# Patient Record
Sex: MALE | Race: WHITE | NOT HISPANIC OR LATINO | Employment: OTHER | URBAN - METROPOLITAN AREA
[De-identification: names, ages, dates, MRNs, and addresses within clinical notes are randomized per-mention and may not be internally consistent; named-entity substitution may affect disease eponyms.]

---

## 2018-01-17 ENCOUNTER — APPOINTMENT (OUTPATIENT)
Dept: GENERAL RADIOLOGY | Facility: HOSPITAL | Age: 68
End: 2018-01-17

## 2018-01-17 ENCOUNTER — APPOINTMENT (OUTPATIENT)
Dept: CT IMAGING | Facility: HOSPITAL | Age: 68
End: 2018-01-17
Attending: INTERNAL MEDICINE

## 2018-01-17 ENCOUNTER — HOSPITAL ENCOUNTER (INPATIENT)
Facility: HOSPITAL | Age: 68
LOS: 8 days | Discharge: NURSING, REHAB OR LONG-TERM CARE | End: 2018-01-25
Attending: INTERNAL MEDICINE | Admitting: HOSPITALIST

## 2018-01-17 DIAGNOSIS — R68.81 EARLY SATIETY: Primary | ICD-10-CM

## 2018-01-17 DIAGNOSIS — L97.819: ICD-10-CM

## 2018-01-17 DIAGNOSIS — R41.841 COGNITIVE COMMUNICATION DEFICIT: ICD-10-CM

## 2018-01-17 PROBLEM — E11.10 DKA (DIABETIC KETOACIDOSES): Status: ACTIVE | Noted: 2018-01-17

## 2018-01-17 LAB
ALBUMIN SERPL-MCNC: 2.5 G/DL (ref 3.5–5.2)
ALBUMIN/GLOB SERPL: 0.6 G/DL
ALP SERPL-CCNC: 137 U/L (ref 40–129)
ALT SERPL W P-5'-P-CCNC: 9 U/L (ref 5–41)
ANION GAP SERPL CALCULATED.3IONS-SCNC: 14.3 MMOL/L
ANION GAP SERPL CALCULATED.3IONS-SCNC: 15.4 MMOL/L
ANION GAP SERPL CALCULATED.3IONS-SCNC: 19.5 MMOL/L
ANION GAP SERPL CALCULATED.3IONS-SCNC: 22.3 MMOL/L
ARTERIAL PATENCY WRIST A: ABNORMAL
AST SERPL-CCNC: 9 U/L (ref 5–40)
ATMOSPHERIC PRESS: 753 MMHG
BACTERIA UR QL AUTO: ABNORMAL /HPF
BASE EXCESS BLDA CALC-SCNC: -3.1 MMOL/L (ref 0–2)
BASOPHILS # BLD AUTO: 0.06 10*3/MM3 (ref 0–0.2)
BASOPHILS NFR BLD AUTO: 0.3 % (ref 0–2)
BDY SITE: ABNORMAL
BILIRUB SERPL-MCNC: 0.2 MG/DL (ref 0.2–1.2)
BILIRUB UR QL STRIP: NEGATIVE
BODY TEMPERATURE: 37 C
BUN BLD-MCNC: 34 MG/DL (ref 8–23)
BUN BLD-MCNC: 35 MG/DL (ref 8–23)
BUN BLD-MCNC: 36 MG/DL (ref 8–23)
BUN BLD-MCNC: 36 MG/DL (ref 8–23)
BUN/CREAT SERPL: 10.4 (ref 7–25)
BUN/CREAT SERPL: 10.5 (ref 7–25)
BUN/CREAT SERPL: 11.1 (ref 7–25)
BUN/CREAT SERPL: 11.1 (ref 7–25)
CALCIUM SPEC-SCNC: 8.4 MG/DL (ref 8.8–10.5)
CALCIUM SPEC-SCNC: 8.5 MG/DL (ref 8.8–10.5)
CALCIUM SPEC-SCNC: 8.6 MG/DL (ref 8.8–10.5)
CALCIUM SPEC-SCNC: 8.8 MG/DL (ref 8.8–10.5)
CHLORIDE SERPL-SCNC: 100 MMOL/L (ref 98–107)
CHLORIDE SERPL-SCNC: 100 MMOL/L (ref 98–107)
CHLORIDE SERPL-SCNC: 95 MMOL/L (ref 98–107)
CHLORIDE SERPL-SCNC: 99 MMOL/L (ref 98–107)
CLARITY UR: CLEAR
CO2 SERPL-SCNC: 16.5 MMOL/L (ref 22–29)
CO2 SERPL-SCNC: 16.7 MMOL/L (ref 22–29)
CO2 SERPL-SCNC: 19.6 MMOL/L (ref 22–29)
CO2 SERPL-SCNC: 19.7 MMOL/L (ref 22–29)
COLOR UR: YELLOW
CREAT BLD-MCNC: 3.14 MG/DL (ref 0.76–1.27)
CREAT BLD-MCNC: 3.24 MG/DL (ref 0.76–1.27)
CREAT BLD-MCNC: 3.24 MG/DL (ref 0.76–1.27)
CREAT BLD-MCNC: 3.45 MG/DL (ref 0.76–1.27)
D-LACTATE SERPL-SCNC: 1 MMOL/L (ref 0.5–2)
DEPRECATED RDW RBC AUTO: 38.1 FL (ref 37–54)
EOSINOPHIL # BLD AUTO: 0 10*3/MM3 (ref 0.1–0.3)
EOSINOPHIL NFR BLD AUTO: 0 % (ref 0–4)
ERYTHROCYTE [DISTWIDTH] IN BLOOD BY AUTOMATED COUNT: 12.6 % (ref 11.5–14.5)
GFR SERPL CREATININE-BSD FRML MDRD: 18 ML/MIN/1.73
GFR SERPL CREATININE-BSD FRML MDRD: 19 ML/MIN/1.73
GFR SERPL CREATININE-BSD FRML MDRD: 19 ML/MIN/1.73
GFR SERPL CREATININE-BSD FRML MDRD: 20 ML/MIN/1.73
GLOBULIN UR ELPH-MCNC: 4.3 GM/DL
GLUCOSE BLD-MCNC: 127 MG/DL (ref 65–99)
GLUCOSE BLD-MCNC: 159 MG/DL (ref 65–99)
GLUCOSE BLD-MCNC: 166 MG/DL (ref 65–99)
GLUCOSE BLD-MCNC: 205 MG/DL (ref 65–99)
GLUCOSE BLDC GLUCOMTR-MCNC: 117 MG/DL (ref 70–130)
GLUCOSE BLDC GLUCOMTR-MCNC: 118 MG/DL (ref 70–130)
GLUCOSE BLDC GLUCOMTR-MCNC: 122 MG/DL (ref 70–130)
GLUCOSE BLDC GLUCOMTR-MCNC: 150 MG/DL (ref 70–130)
GLUCOSE BLDC GLUCOMTR-MCNC: 152 MG/DL (ref 70–130)
GLUCOSE BLDC GLUCOMTR-MCNC: 161 MG/DL (ref 70–130)
GLUCOSE BLDC GLUCOMTR-MCNC: 162 MG/DL (ref 70–130)
GLUCOSE BLDC GLUCOMTR-MCNC: 165 MG/DL (ref 70–130)
GLUCOSE BLDC GLUCOMTR-MCNC: 166 MG/DL (ref 70–130)
GLUCOSE BLDC GLUCOMTR-MCNC: 170 MG/DL (ref 70–130)
GLUCOSE BLDC GLUCOMTR-MCNC: 171 MG/DL (ref 70–130)
GLUCOSE BLDC GLUCOMTR-MCNC: 198 MG/DL (ref 70–130)
GLUCOSE BLDC GLUCOMTR-MCNC: 209 MG/DL (ref 70–130)
GLUCOSE BLDC GLUCOMTR-MCNC: 228 MG/DL (ref 70–130)
GLUCOSE UR STRIP-MCNC: ABNORMAL MG/DL
HBA1C MFR BLD: 12.7 % (ref 4.8–5.6)
HCO3 BLDA-SCNC: 20.7 MMOL/L (ref 20–26)
HCT VFR BLD AUTO: 37.2 % (ref 42–52)
HGB BLD-MCNC: 12.8 G/DL (ref 14–18)
HGB BLDA-MCNC: 12.8 G/DL (ref 14–18)
HGB UR QL STRIP.AUTO: ABNORMAL
HYALINE CASTS UR QL AUTO: ABNORMAL /LPF
IMM GRANULOCYTES # BLD: 0.37 10*3/MM3 (ref 0–0.03)
IMM GRANULOCYTES NFR BLD: 1.6 % (ref 0–0.5)
KETONES UR QL STRIP: ABNORMAL
LEUKOCYTE ESTERASE UR QL STRIP.AUTO: NEGATIVE
LYMPHOCYTES # BLD AUTO: 0.78 10*3/MM3 (ref 0.6–4.8)
LYMPHOCYTES NFR BLD AUTO: 3.5 % (ref 20–45)
Lab: ABNORMAL
MAGNESIUM SERPL-MCNC: 2 MG/DL (ref 1.7–2.5)
MAGNESIUM SERPL-MCNC: 2 MG/DL (ref 1.7–2.5)
MAGNESIUM SERPL-MCNC: 2.1 MG/DL (ref 1.7–2.5)
MAGNESIUM SERPL-MCNC: 2.1 MG/DL (ref 1.7–2.5)
MCH RBC QN AUTO: 28.4 PG (ref 27–31)
MCHC RBC AUTO-ENTMCNC: 34.4 G/DL (ref 31–37)
MCV RBC AUTO: 82.7 FL (ref 80–94)
MODALITY: ABNORMAL
MONOCYTES # BLD AUTO: 0.39 10*3/MM3 (ref 0–1)
MONOCYTES NFR BLD AUTO: 1.7 % (ref 3–8)
NEUTROPHILS # BLD AUTO: 20.99 10*3/MM3 (ref 1.5–8.3)
NEUTROPHILS NFR BLD AUTO: 92.9 % (ref 45–70)
NITRITE UR QL STRIP: NEGATIVE
NRBC BLD MANUAL-RTO: 0 /100 WBC (ref 0–0)
PCO2 BLDA: 32.5 MM HG (ref 35–45)
PCO2 TEMP ADJ BLD: 32.5 MM HG (ref 35–45)
PH BLDA: 7.41 PH UNITS (ref 7.35–7.45)
PH UR STRIP.AUTO: 6 [PH] (ref 4.5–8)
PH, TEMP CORRECTED: 7.41 PH UNITS (ref 7.35–7.45)
PHOSPHATE SERPL-MCNC: 3.8 MG/DL (ref 2.7–4.5)
PHOSPHATE SERPL-MCNC: 3.9 MG/DL (ref 2.7–4.5)
PHOSPHATE SERPL-MCNC: 4.5 MG/DL (ref 2.7–4.5)
PHOSPHATE SERPL-MCNC: 4.6 MG/DL (ref 2.7–4.5)
PLATELET # BLD AUTO: 516 10*3/MM3 (ref 140–500)
PMV BLD AUTO: 9.7 FL (ref 7.4–10.4)
PO2 BLDA: 73.6 MM HG (ref 83–108)
PO2 TEMP ADJ BLD: 73.6 MM HG (ref 83–108)
POTASSIUM BLD-SCNC: 3.2 MMOL/L (ref 3.5–5.2)
POTASSIUM BLD-SCNC: 3.2 MMOL/L (ref 3.5–5.2)
POTASSIUM BLD-SCNC: 3.5 MMOL/L (ref 3.5–5.2)
POTASSIUM BLD-SCNC: 3.9 MMOL/L (ref 3.5–5.2)
PROCALCITONIN SERPL-MCNC: 7.22 NG/ML (ref 0.1–0.25)
PROT SERPL-MCNC: 6.8 G/DL (ref 6–8.5)
PROT UR QL STRIP: ABNORMAL
RBC # BLD AUTO: 4.5 10*6/MM3 (ref 4.7–6.1)
RBC # UR: ABNORMAL /HPF
REF LAB TEST METHOD: ABNORMAL
SAO2 % BLDCOA: 94.8 % (ref 94–99)
SODIUM BLD-SCNC: 133 MMOL/L (ref 136–145)
SODIUM BLD-SCNC: 134 MMOL/L (ref 136–145)
SODIUM BLD-SCNC: 135 MMOL/L (ref 136–145)
SODIUM BLD-SCNC: 136 MMOL/L (ref 136–145)
SP GR UR STRIP: 1.01 (ref 1–1.03)
SQUAMOUS #/AREA URNS HPF: ABNORMAL /HPF
TROPONIN T SERPL-MCNC: 0.03 NG/ML (ref 0–0.03)
TROPONIN T SERPL-MCNC: 0.04 NG/ML (ref 0–0.03)
TROPONIN T SERPL-MCNC: 0.04 NG/ML (ref 0–0.03)
UROBILINOGEN UR QL STRIP: ABNORMAL
VENTILATOR MODE: ABNORMAL
WBC NRBC COR # BLD: 22.59 10*3/MM3 (ref 4.8–10.8)
WBC UR QL AUTO: ABNORMAL /HPF

## 2018-01-17 PROCEDURE — 63710000001 INSULIN ASPART PER 5 UNITS: Performed by: HOSPITALIST

## 2018-01-17 PROCEDURE — 80053 COMPREHEN METABOLIC PANEL: CPT | Performed by: INTERNAL MEDICINE

## 2018-01-17 PROCEDURE — 84100 ASSAY OF PHOSPHORUS: CPT | Performed by: INTERNAL MEDICINE

## 2018-01-17 PROCEDURE — 82962 GLUCOSE BLOOD TEST: CPT

## 2018-01-17 PROCEDURE — 25010000002 HYDRALAZINE PER 20 MG: Performed by: INTERNAL MEDICINE

## 2018-01-17 PROCEDURE — 84484 ASSAY OF TROPONIN QUANT: CPT | Performed by: INTERNAL MEDICINE

## 2018-01-17 PROCEDURE — 83605 ASSAY OF LACTIC ACID: CPT | Performed by: INTERNAL MEDICINE

## 2018-01-17 PROCEDURE — 25010000002 PIPERACILLIN-TAZOBACTAM: Performed by: INTERNAL MEDICINE

## 2018-01-17 PROCEDURE — 63710000001 INSULIN DETEMIR PER 5 UNITS: Performed by: HOSPITALIST

## 2018-01-17 PROCEDURE — 84145 PROCALCITONIN (PCT): CPT | Performed by: INTERNAL MEDICINE

## 2018-01-17 PROCEDURE — 83036 HEMOGLOBIN GLYCOSYLATED A1C: CPT | Performed by: INTERNAL MEDICINE

## 2018-01-17 PROCEDURE — 25010000002 HYDROMORPHONE PER 4 MG: Performed by: INTERNAL MEDICINE

## 2018-01-17 PROCEDURE — 82803 BLOOD GASES ANY COMBINATION: CPT

## 2018-01-17 PROCEDURE — 93010 ELECTROCARDIOGRAM REPORT: CPT | Performed by: INTERNAL MEDICINE

## 2018-01-17 PROCEDURE — 25010000002 ONDANSETRON PER 1 MG: Performed by: INTERNAL MEDICINE

## 2018-01-17 PROCEDURE — 36600 WITHDRAWAL OF ARTERIAL BLOOD: CPT

## 2018-01-17 PROCEDURE — 25010000002 ENOXAPARIN PER 10 MG

## 2018-01-17 PROCEDURE — 25010000002 HYDRALAZINE PER 20 MG: Performed by: NURSE PRACTITIONER

## 2018-01-17 PROCEDURE — 93005 ELECTROCARDIOGRAM TRACING: CPT | Performed by: INTERNAL MEDICINE

## 2018-01-17 PROCEDURE — 25010000002 PIPERACILLIN SOD-TAZOBACTAM PER 1 G

## 2018-01-17 PROCEDURE — 71045 X-RAY EXAM CHEST 1 VIEW: CPT

## 2018-01-17 PROCEDURE — 74176 CT ABD & PELVIS W/O CONTRAST: CPT

## 2018-01-17 PROCEDURE — 25010000002 PROMETHAZINE PER 50 MG: Performed by: HOSPITALIST

## 2018-01-17 PROCEDURE — 83735 ASSAY OF MAGNESIUM: CPT | Performed by: INTERNAL MEDICINE

## 2018-01-17 PROCEDURE — 25010000002 VANCOMYCIN PER 500 MG: Performed by: INTERNAL MEDICINE

## 2018-01-17 PROCEDURE — 25010000003 POTASSIUM CHLORIDE 10 MEQ/100ML SOLUTION: Performed by: INTERNAL MEDICINE

## 2018-01-17 PROCEDURE — 25010000002 PROMETHAZINE PER 50 MG: Performed by: NURSE PRACTITIONER

## 2018-01-17 PROCEDURE — 25010000002 ONDANSETRON PER 1 MG

## 2018-01-17 PROCEDURE — 85025 COMPLETE CBC W/AUTO DIFF WBC: CPT | Performed by: INTERNAL MEDICINE

## 2018-01-17 PROCEDURE — 99223 1ST HOSP IP/OBS HIGH 75: CPT | Performed by: HOSPITALIST

## 2018-01-17 PROCEDURE — 25010000002 HYDRALAZINE PER 20 MG

## 2018-01-17 PROCEDURE — 81001 URINALYSIS AUTO W/SCOPE: CPT | Performed by: INTERNAL MEDICINE

## 2018-01-17 RX ORDER — PIPERACILLIN SODIUM, TAZOBACTAM SODIUM 3; .375 G/15ML; G/15ML
INJECTION, POWDER, LYOPHILIZED, FOR SOLUTION INTRAVENOUS
Status: COMPLETED
Start: 2018-01-17 | End: 2018-01-17

## 2018-01-17 RX ORDER — TRAZODONE HYDROCHLORIDE 50 MG/1
50 TABLET ORAL 2 TIMES DAILY
COMMUNITY
End: 2018-01-25 | Stop reason: HOSPADM

## 2018-01-17 RX ORDER — SODIUM CHLORIDE 9 MG/ML
40 INJECTION, SOLUTION INTRAVENOUS AS NEEDED
Status: DISCONTINUED | OUTPATIENT
Start: 2018-01-17 | End: 2018-01-25 | Stop reason: HOSPADM

## 2018-01-17 RX ORDER — DEXTROSE MONOHYDRATE 25 G/50ML
12.5 INJECTION, SOLUTION INTRAVENOUS
Status: DISCONTINUED | OUTPATIENT
Start: 2018-01-17 | End: 2018-01-25 | Stop reason: HOSPADM

## 2018-01-17 RX ORDER — PROMETHAZINE HYDROCHLORIDE 25 MG/ML
INJECTION, SOLUTION INTRAMUSCULAR; INTRAVENOUS
Status: DISPENSED
Start: 2018-01-17 | End: 2018-01-18

## 2018-01-17 RX ORDER — NICOTINE POLACRILEX 4 MG
15 LOZENGE BUCCAL
Status: DISCONTINUED | OUTPATIENT
Start: 2018-01-17 | End: 2018-01-18

## 2018-01-17 RX ORDER — GABAPENTIN 300 MG/1
300 CAPSULE ORAL 3 TIMES DAILY
COMMUNITY
End: 2018-01-25 | Stop reason: HOSPADM

## 2018-01-17 RX ORDER — RISPERIDONE 1 MG/1
2 TABLET ORAL DAILY
Status: DISCONTINUED | OUTPATIENT
Start: 2018-01-17 | End: 2018-01-18

## 2018-01-17 RX ORDER — PROMETHAZINE HYDROCHLORIDE 12.5 MG/1
12.5 SUPPOSITORY RECTAL EVERY 6 HOURS PRN
Status: DISCONTINUED | OUTPATIENT
Start: 2018-01-17 | End: 2018-01-17

## 2018-01-17 RX ORDER — PROMETHAZINE HYDROCHLORIDE 25 MG/ML
12.5 INJECTION, SOLUTION INTRAMUSCULAR; INTRAVENOUS EVERY 6 HOURS PRN
Status: DISCONTINUED | OUTPATIENT
Start: 2018-01-17 | End: 2018-01-17

## 2018-01-17 RX ORDER — POTASSIUM CHLORIDE 7.45 MG/ML
INJECTION INTRAVENOUS
Status: DISPENSED
Start: 2018-01-17 | End: 2018-01-17

## 2018-01-17 RX ORDER — POTASSIUM CHLORIDE 7.46 G/1000ML
10 INJECTION, SOLUTION INTRAVENOUS
Status: DISCONTINUED | OUTPATIENT
Start: 2018-01-17 | End: 2018-01-18

## 2018-01-17 RX ORDER — MAGNESIUM SULFATE HEPTAHYDRATE 40 MG/ML
2 INJECTION, SOLUTION INTRAVENOUS AS NEEDED
Status: DISCONTINUED | OUTPATIENT
Start: 2018-01-17 | End: 2018-01-18

## 2018-01-17 RX ORDER — ACETAMINOPHEN 325 MG/1
650 TABLET ORAL EVERY 6 HOURS PRN
COMMUNITY

## 2018-01-17 RX ORDER — CITALOPRAM 20 MG/1
20 TABLET ORAL DAILY
COMMUNITY

## 2018-01-17 RX ORDER — SODIUM CHLORIDE 9 MG/ML
100 INJECTION, SOLUTION INTRAVENOUS CONTINUOUS
Status: DISCONTINUED | OUTPATIENT
Start: 2018-01-17 | End: 2018-01-18

## 2018-01-17 RX ORDER — ONDANSETRON 2 MG/ML
4 INJECTION INTRAMUSCULAR; INTRAVENOUS EVERY 6 HOURS PRN
Status: DISCONTINUED | OUTPATIENT
Start: 2018-01-17 | End: 2018-01-25 | Stop reason: HOSPADM

## 2018-01-17 RX ORDER — DEXTROSE AND SODIUM CHLORIDE 5; .45 G/100ML; G/100ML
125 INJECTION, SOLUTION INTRAVENOUS CONTINUOUS
Status: DISCONTINUED | OUTPATIENT
Start: 2018-01-17 | End: 2018-01-17

## 2018-01-17 RX ORDER — PETROLATUM 42 G/100G
OINTMENT TOPICAL AS NEEDED
Status: DISCONTINUED | OUTPATIENT
Start: 2018-01-17 | End: 2018-01-25 | Stop reason: HOSPADM

## 2018-01-17 RX ORDER — ASPIRIN 81 MG/1
81 TABLET ORAL DAILY
COMMUNITY

## 2018-01-17 RX ORDER — ENALAPRILAT 2.5 MG/2ML
2.5 INJECTION INTRAVENOUS EVERY 6 HOURS
Status: DISCONTINUED | OUTPATIENT
Start: 2018-01-17 | End: 2018-01-17

## 2018-01-17 RX ORDER — DEXTROSE MONOHYDRATE 25 G/50ML
25 INJECTION, SOLUTION INTRAVENOUS
Status: DISCONTINUED | OUTPATIENT
Start: 2018-01-17 | End: 2018-01-25 | Stop reason: HOSPADM

## 2018-01-17 RX ORDER — ONDANSETRON 2 MG/ML
INJECTION INTRAMUSCULAR; INTRAVENOUS
Status: COMPLETED
Start: 2018-01-17 | End: 2018-01-17

## 2018-01-17 RX ORDER — CLONIDINE HYDROCHLORIDE 0.1 MG/1
0.1 TABLET ORAL 3 TIMES DAILY
COMMUNITY

## 2018-01-17 RX ORDER — MAGNESIUM SULFATE HEPTAHYDRATE 40 MG/ML
4 INJECTION, SOLUTION INTRAVENOUS AS NEEDED
Status: DISCONTINUED | OUTPATIENT
Start: 2018-01-17 | End: 2018-01-18

## 2018-01-17 RX ORDER — PETROLATUM 42 G/100G
OINTMENT TOPICAL EVERY 12 HOURS SCHEDULED
Status: DISCONTINUED | OUTPATIENT
Start: 2018-01-17 | End: 2018-01-17

## 2018-01-17 RX ORDER — PROMETHAZINE HYDROCHLORIDE 12.5 MG/1
12.5 SUPPOSITORY RECTAL EVERY 6 HOURS PRN
Status: DISCONTINUED | OUTPATIENT
Start: 2018-01-17 | End: 2018-01-18

## 2018-01-17 RX ORDER — HYDROMORPHONE HCL 110MG/55ML
1 PATIENT CONTROLLED ANALGESIA SYRINGE INTRAVENOUS EVERY 4 HOURS PRN
Status: DISCONTINUED | OUTPATIENT
Start: 2018-01-17 | End: 2018-01-18

## 2018-01-17 RX ORDER — POTASSIUM CHLORIDE 750 MG/1
20 CAPSULE, EXTENDED RELEASE ORAL AS NEEDED
Status: DISCONTINUED | OUTPATIENT
Start: 2018-01-17 | End: 2018-01-18

## 2018-01-17 RX ORDER — POTASSIUM CHLORIDE 1.5 G/1.77G
20 POWDER, FOR SOLUTION ORAL AS NEEDED
Status: DISCONTINUED | OUTPATIENT
Start: 2018-01-17 | End: 2018-01-18

## 2018-01-17 RX ORDER — RISPERIDONE 2 MG/1
2 TABLET ORAL DAILY
COMMUNITY
End: 2018-04-12

## 2018-01-17 RX ORDER — HYDRALAZINE HYDROCHLORIDE 20 MG/ML
20 INJECTION INTRAMUSCULAR; INTRAVENOUS EVERY 6 HOURS PRN
Status: DISCONTINUED | OUTPATIENT
Start: 2018-01-17 | End: 2018-01-25 | Stop reason: HOSPADM

## 2018-01-17 RX ORDER — POTASSIUM CHLORIDE 750 MG/1
10 CAPSULE, EXTENDED RELEASE ORAL AS NEEDED
Status: DISCONTINUED | OUTPATIENT
Start: 2018-01-17 | End: 2018-01-18

## 2018-01-17 RX ORDER — OXYCODONE HYDROCHLORIDE AND ACETAMINOPHEN 5; 325 MG/1; MG/1
1 TABLET ORAL DAILY
COMMUNITY
End: 2018-01-25 | Stop reason: HOSPADM

## 2018-01-17 RX ORDER — ENALAPRILAT 2.5 MG/2ML
INJECTION INTRAVENOUS
Status: COMPLETED
Start: 2018-01-17 | End: 2018-01-17

## 2018-01-17 RX ORDER — LISINOPRIL 20 MG/1
20 TABLET ORAL DAILY
COMMUNITY
End: 2018-01-25 | Stop reason: HOSPADM

## 2018-01-17 RX ORDER — DEXTROSE, SODIUM CHLORIDE, AND POTASSIUM CHLORIDE 5; .45; .15 G/100ML; G/100ML; G/100ML
150 INJECTION INTRAVENOUS CONTINUOUS PRN
Status: DISCONTINUED | OUTPATIENT
Start: 2018-01-17 | End: 2018-01-17

## 2018-01-17 RX ORDER — SODIUM CHLORIDE 9 MG/ML
INJECTION, SOLUTION INTRAVENOUS
Status: DISPENSED
Start: 2018-01-17 | End: 2018-01-17

## 2018-01-17 RX ORDER — POTASSIUM CHLORIDE 1.5 G/1.77G
10 POWDER, FOR SOLUTION ORAL AS NEEDED
Status: DISCONTINUED | OUTPATIENT
Start: 2018-01-17 | End: 2018-01-18

## 2018-01-17 RX ORDER — PROMETHAZINE HYDROCHLORIDE 25 MG/ML
12.5 INJECTION, SOLUTION INTRAMUSCULAR; INTRAVENOUS EVERY 6 HOURS PRN
Status: DISCONTINUED | OUTPATIENT
Start: 2018-01-17 | End: 2018-01-18

## 2018-01-17 RX ORDER — SODIUM CHLORIDE AND POTASSIUM CHLORIDE 150; 450 MG/100ML; MG/100ML
250 INJECTION, SOLUTION INTRAVENOUS CONTINUOUS PRN
Status: DISCONTINUED | OUTPATIENT
Start: 2018-01-17 | End: 2018-01-17

## 2018-01-17 RX ORDER — SODIUM CHLORIDE 9 MG/ML
INJECTION, SOLUTION INTRAVENOUS
Status: COMPLETED
Start: 2018-01-17 | End: 2018-01-17

## 2018-01-17 RX ORDER — POTASSIUM CHLORIDE 1.5 G/1.77G
40 POWDER, FOR SOLUTION ORAL AS NEEDED
Status: DISCONTINUED | OUTPATIENT
Start: 2018-01-17 | End: 2018-01-18

## 2018-01-17 RX ORDER — PROMETHAZINE HYDROCHLORIDE 12.5 MG/1
12.5 TABLET ORAL EVERY 6 HOURS PRN
Status: DISCONTINUED | OUTPATIENT
Start: 2018-01-17 | End: 2018-01-17

## 2018-01-17 RX ORDER — SODIUM CHLORIDE 450 MG/100ML
250 INJECTION, SOLUTION INTRAVENOUS CONTINUOUS
Status: DISCONTINUED | OUTPATIENT
Start: 2018-01-17 | End: 2018-01-17

## 2018-01-17 RX ORDER — ACETAMINOPHEN 650 MG/1
650 SUPPOSITORY RECTAL EVERY 4 HOURS PRN
Status: DISCONTINUED | OUTPATIENT
Start: 2018-01-17 | End: 2018-01-25 | Stop reason: HOSPADM

## 2018-01-17 RX ORDER — CLONIDINE HYDROCHLORIDE 0.1 MG/1
0.1 TABLET ORAL EVERY 12 HOURS SCHEDULED
Status: DISCONTINUED | OUTPATIENT
Start: 2018-01-17 | End: 2018-01-25 | Stop reason: HOSPADM

## 2018-01-17 RX ORDER — INSULIN GLARGINE 100 [IU]/ML
INJECTION, SOLUTION SUBCUTANEOUS DAILY
COMMUNITY
End: 2018-01-25 | Stop reason: HOSPADM

## 2018-01-17 RX ORDER — SODIUM CHLORIDE 0.9 % (FLUSH) 0.9 %
1-10 SYRINGE (ML) INJECTION AS NEEDED
Status: DISCONTINUED | OUTPATIENT
Start: 2018-01-17 | End: 2018-01-25 | Stop reason: HOSPADM

## 2018-01-17 RX ORDER — PROMETHAZINE HYDROCHLORIDE 12.5 MG/1
12.5 TABLET ORAL EVERY 6 HOURS PRN
Status: DISCONTINUED | OUTPATIENT
Start: 2018-01-17 | End: 2018-01-18

## 2018-01-17 RX ORDER — POTASSIUM CHLORIDE 750 MG/1
40 CAPSULE, EXTENDED RELEASE ORAL AS NEEDED
Status: DISCONTINUED | OUTPATIENT
Start: 2018-01-17 | End: 2018-01-18

## 2018-01-17 RX ORDER — DEXTROSE, SODIUM CHLORIDE, AND POTASSIUM CHLORIDE 5; .45; .15 G/100ML; G/100ML; G/100ML
INJECTION INTRAVENOUS
Status: COMPLETED
Start: 2018-01-17 | End: 2018-01-17

## 2018-01-17 RX ORDER — FAMOTIDINE 10 MG/ML
20 INJECTION, SOLUTION INTRAVENOUS DAILY
Status: DISCONTINUED | OUTPATIENT
Start: 2018-01-17 | End: 2018-01-20

## 2018-01-17 RX ORDER — BUPROPION HYDROCHLORIDE 100 MG/1
100 TABLET ORAL 2 TIMES DAILY
COMMUNITY
End: 2018-01-25 | Stop reason: HOSPADM

## 2018-01-17 RX ORDER — HYDRALAZINE HYDROCHLORIDE 20 MG/ML
INJECTION INTRAMUSCULAR; INTRAVENOUS
Status: COMPLETED
Start: 2018-01-17 | End: 2018-01-17

## 2018-01-17 RX ORDER — DEXTROSE AND SODIUM CHLORIDE 5; .45 G/100ML; G/100ML
150 INJECTION, SOLUTION INTRAVENOUS CONTINUOUS PRN
Status: DISCONTINUED | OUTPATIENT
Start: 2018-01-17 | End: 2018-01-17

## 2018-01-17 RX ORDER — TRAZODONE HYDROCHLORIDE 50 MG/1
50 TABLET ORAL NIGHTLY
Status: DISCONTINUED | OUTPATIENT
Start: 2018-01-17 | End: 2018-01-18

## 2018-01-17 RX ADMIN — PIPERACILLIN SODIUM,TAZOBACTAM SODIUM 3.38 G: 3; .375 INJECTION, POWDER, FOR SOLUTION INTRAVENOUS at 06:23

## 2018-01-17 RX ADMIN — HYDRALAZINE HYDROCHLORIDE 20 MG: 20 INJECTION INTRAMUSCULAR; INTRAVENOUS at 15:35

## 2018-01-17 RX ADMIN — SODIUM CHLORIDE 50 ML: 9 INJECTION, SOLUTION INTRAVENOUS at 10:27

## 2018-01-17 RX ADMIN — POTASSIUM CHLORIDE 10 MEQ: 10 INJECTION, SOLUTION INTRAVENOUS at 05:15

## 2018-01-17 RX ADMIN — ENALAPRILAT 2.5 MG: 2.5 INJECTION INTRAVENOUS at 03:15

## 2018-01-17 RX ADMIN — RISPERIDONE 2 MG: 1 TABLET ORAL at 21:01

## 2018-01-17 RX ADMIN — PIPERACILLIN SODIUM,TAZOBACTAM SODIUM 3.38 G: 3; .375 INJECTION, POWDER, FOR SOLUTION INTRAVENOUS at 19:11

## 2018-01-17 RX ADMIN — PROMETHAZINE HYDROCHLORIDE 12.5 MG: 25 INJECTION INTRAMUSCULAR; INTRAVENOUS at 17:49

## 2018-01-17 RX ADMIN — POTASSIUM CHLORIDE 10 MEQ: 10 INJECTION, SOLUTION INTRAVENOUS at 09:18

## 2018-01-17 RX ADMIN — HYDRALAZINE HYDROCHLORIDE 20 MG: 20 INJECTION INTRAMUSCULAR; INTRAVENOUS at 06:48

## 2018-01-17 RX ADMIN — HYDROMORPHONE HYDROCHLORIDE 1 MG: 2 INJECTION INTRAMUSCULAR; INTRAVENOUS; SUBCUTANEOUS at 17:09

## 2018-01-17 RX ADMIN — HYDROMORPHONE HYDROCHLORIDE 1 MG: 2 INJECTION INTRAMUSCULAR; INTRAVENOUS; SUBCUTANEOUS at 10:56

## 2018-01-17 RX ADMIN — INSULIN DETEMIR 6 UNITS: 100 INJECTION, SOLUTION SUBCUTANEOUS at 19:15

## 2018-01-17 RX ADMIN — HYDROMORPHONE HYDROCHLORIDE 1 MG: 2 INJECTION INTRAMUSCULAR; INTRAVENOUS; SUBCUTANEOUS at 21:10

## 2018-01-17 RX ADMIN — CLONIDINE HYDROCHLORIDE 0.1 MG: 0.1 TABLET ORAL at 20:55

## 2018-01-17 RX ADMIN — PETROLATUM: 42 OINTMENT TOPICAL at 15:30

## 2018-01-17 RX ADMIN — POTASSIUM CHLORIDE 10 MEQ: 10 INJECTION, SOLUTION INTRAVENOUS at 08:05

## 2018-01-17 RX ADMIN — CLONIDINE HYDROCHLORIDE 0.1 MG: 0.1 TABLET ORAL at 11:06

## 2018-01-17 RX ADMIN — ENOXAPARIN SODIUM 40 MG: 40 INJECTION SUBCUTANEOUS at 06:30

## 2018-01-17 RX ADMIN — HYDRALAZINE HYDROCHLORIDE 20 MG: 20 INJECTION INTRAMUSCULAR; INTRAVENOUS at 09:13

## 2018-01-17 RX ADMIN — FAMOTIDINE 20 MG: 10 INJECTION, SOLUTION INTRAVENOUS at 08:11

## 2018-01-17 RX ADMIN — ONDANSETRON 4 MG: 2 INJECTION, SOLUTION INTRAMUSCULAR; INTRAVENOUS at 15:42

## 2018-01-17 RX ADMIN — POTASSIUM CHLORIDE, DEXTROSE MONOHYDRATE AND SODIUM CHLORIDE 150 ML/HR: 150; 5; 450 INJECTION, SOLUTION INTRAVENOUS at 11:21

## 2018-01-17 RX ADMIN — DEXTROSE MONOHYDRATE, SODIUM CHLORIDE, AND POTASSIUM CHLORIDE 150 ML/HR: 50; 4.5; 1.49 INJECTION, SOLUTION INTRAVENOUS at 03:14

## 2018-01-17 RX ADMIN — POTASSIUM CHLORIDE 10 MEQ: 10 INJECTION, SOLUTION INTRAVENOUS at 15:30

## 2018-01-17 RX ADMIN — ONDANSETRON 4 MG: 2 INJECTION, SOLUTION INTRAMUSCULAR; INTRAVENOUS at 03:15

## 2018-01-17 RX ADMIN — TRAZODONE HYDROCHLORIDE 50 MG: 50 TABLET ORAL at 21:02

## 2018-01-17 RX ADMIN — PROMETHAZINE HYDROCHLORIDE 12.5 MG: 25 INJECTION INTRAMUSCULAR; INTRAVENOUS at 10:26

## 2018-01-17 RX ADMIN — SODIUM CHLORIDE 1 G: 900 INJECTION, SOLUTION INTRAVENOUS at 03:45

## 2018-01-17 RX ADMIN — METOPROLOL TARTRATE 25 MG: 25 TABLET, FILM COATED ORAL at 21:02

## 2018-01-17 RX ADMIN — ENALAPRILAT 2.5 MG: 2.5 INJECTION INTRAVENOUS at 08:49

## 2018-01-17 RX ADMIN — HYDROMORPHONE HYDROCHLORIDE 1 MG: 2 INJECTION INTRAMUSCULAR; INTRAVENOUS; SUBCUTANEOUS at 06:30

## 2018-01-17 RX ADMIN — SODIUM CHLORIDE 125 ML/HR: 9 INJECTION, SOLUTION INTRAVENOUS at 19:10

## 2018-01-17 RX ADMIN — INSULIN ASPART 4 UNITS: 100 INJECTION, SOLUTION INTRAVENOUS; SUBCUTANEOUS at 21:01

## 2018-01-17 RX ADMIN — SODIUM CHLORIDE 1000 ML: 9 INJECTION, SOLUTION INTRAVENOUS at 01:57

## 2018-01-17 RX ADMIN — POTASSIUM CHLORIDE 10 MEQ: 10 INJECTION, SOLUTION INTRAVENOUS at 14:03

## 2018-01-17 RX ADMIN — POTASSIUM CHLORIDE, DEXTROSE MONOHYDRATE AND SODIUM CHLORIDE 150 ML/HR: 150; 5; 450 INJECTION, SOLUTION INTRAVENOUS at 03:14

## 2018-01-17 RX ADMIN — PIPERACILLIN SODIUM,TAZOBACTAM SODIUM 3.38 G: 3; .375 INJECTION, POWDER, FOR SOLUTION INTRAVENOUS at 14:13

## 2018-01-17 RX ADMIN — METOROPROLOL TARTRATE 5 MG: 5 INJECTION, SOLUTION INTRAVENOUS at 19:22

## 2018-01-17 RX ADMIN — POTASSIUM CHLORIDE 10 MEQ: 10 INJECTION, SOLUTION INTRAVENOUS at 06:52

## 2018-01-17 RX ADMIN — PIPERACILLIN SODIUM,TAZOBACTAM SODIUM 3375 MG: 3; .375 INJECTION, POWDER, FOR SOLUTION INTRAVENOUS at 14:02

## 2018-01-17 RX ADMIN — ONDANSETRON 4 MG: 2 INJECTION, SOLUTION INTRAMUSCULAR; INTRAVENOUS at 08:10

## 2018-01-18 ENCOUNTER — APPOINTMENT (OUTPATIENT)
Dept: ULTRASOUND IMAGING | Facility: HOSPITAL | Age: 68
End: 2018-01-18

## 2018-01-18 ENCOUNTER — INPATIENT HOSPITAL (OUTPATIENT)
Dept: URBAN - METROPOLITAN AREA HOSPITAL 27 | Facility: HOSPITAL | Age: 68
End: 2018-01-18
Payer: MEDICARE

## 2018-01-18 ENCOUNTER — APPOINTMENT (OUTPATIENT)
Dept: GENERAL RADIOLOGY | Facility: HOSPITAL | Age: 68
End: 2018-01-18

## 2018-01-18 DIAGNOSIS — R11.2 NAUSEA WITH VOMITING, UNSPECIFIED: ICD-10-CM

## 2018-01-18 DIAGNOSIS — K80.20 CALCULUS OF GALLBLADDER WITHOUT CHOLECYSTITIS WITHOUT OBSTRU: ICD-10-CM

## 2018-01-18 DIAGNOSIS — R68.81 EARLY SATIETY: ICD-10-CM

## 2018-01-18 DIAGNOSIS — R10.30 LOWER ABDOMINAL PAIN, UNSPECIFIED: ICD-10-CM

## 2018-01-18 LAB
ANION GAP SERPL CALCULATED.3IONS-SCNC: 14.2 MMOL/L
ANION GAP SERPL CALCULATED.3IONS-SCNC: 19.3 MMOL/L
ARTERIAL PATENCY WRIST A: ABNORMAL
ATMOSPHERIC PRESS: 746 MMHG
BASE EXCESS BLDA CALC-SCNC: -7.8 MMOL/L (ref 0–2)
BASOPHILS # BLD AUTO: 0.04 10*3/MM3 (ref 0–0.2)
BASOPHILS NFR BLD AUTO: 0.2 % (ref 0–2)
BDY SITE: ABNORMAL
BODY TEMPERATURE: 37 C
BUN BLD-MCNC: 36 MG/DL (ref 8–23)
BUN BLD-MCNC: 40 MG/DL (ref 8–23)
BUN/CREAT SERPL: 10.2 (ref 7–25)
BUN/CREAT SERPL: 11 (ref 7–25)
C DIFF GDH STL QL: NEGATIVE
CALCIUM SPEC-SCNC: 8.3 MG/DL (ref 8.8–10.5)
CALCIUM SPEC-SCNC: 8.5 MG/DL (ref 8.8–10.5)
CHLORIDE SERPL-SCNC: 101 MMOL/L (ref 98–107)
CHLORIDE SERPL-SCNC: 105 MMOL/L (ref 98–107)
CHLORIDE UR-SCNC: 32 MMOL/L
CK SERPL-CCNC: 46 U/L (ref 36–170)
CO2 SERPL-SCNC: 14.7 MMOL/L (ref 22–29)
CO2 SERPL-SCNC: 18.8 MMOL/L (ref 22–29)
CREAT BLD-MCNC: 3.54 MG/DL (ref 0.76–1.27)
CREAT BLD-MCNC: 3.64 MG/DL (ref 0.76–1.27)
CREAT UR-MCNC: 38.5 MG/DL
D-LACTATE SERPL-SCNC: 0.7 MMOL/L (ref 0.5–2)
DEPRECATED RDW RBC AUTO: 40.9 FL (ref 37–54)
EOSINOPHIL # BLD AUTO: 0.01 10*3/MM3 (ref 0.1–0.3)
EOSINOPHIL NFR BLD AUTO: 0.1 % (ref 0–4)
ERYTHROCYTE [DISTWIDTH] IN BLOOD BY AUTOMATED COUNT: 13.2 % (ref 11.5–14.5)
ERYTHROCYTE [SEDIMENTATION RATE] IN BLOOD: 85 MM/HR (ref 0–20)
GAS FLOW AIRWAY: 0 LPM
GFR SERPL CREATININE-BSD FRML MDRD: 17 ML/MIN/1.73
GFR SERPL CREATININE-BSD FRML MDRD: 17 ML/MIN/1.73
GLUCOSE BLD-MCNC: 160 MG/DL (ref 65–99)
GLUCOSE BLD-MCNC: 236 MG/DL (ref 65–99)
GLUCOSE BLDC GLUCOMTR-MCNC: 151 MG/DL (ref 70–130)
GLUCOSE BLDC GLUCOMTR-MCNC: 231 MG/DL (ref 70–130)
HCO3 BLDA-SCNC: 16 MMOL/L (ref 20–26)
HCT VFR BLD AUTO: 35.8 % (ref 42–52)
HGB BLD-MCNC: 11.8 G/DL (ref 14–18)
HGB BLDA-MCNC: 11.6 G/DL (ref 14–18)
IMM GRANULOCYTES # BLD: 0.4 10*3/MM3 (ref 0–0.03)
IMM GRANULOCYTES NFR BLD: 2.2 % (ref 0–0.5)
LYMPHOCYTES # BLD AUTO: 0.95 10*3/MM3 (ref 0.6–4.8)
LYMPHOCYTES NFR BLD AUTO: 5.3 % (ref 20–45)
Lab: ABNORMAL
MCH RBC QN AUTO: 28 PG (ref 27–31)
MCHC RBC AUTO-ENTMCNC: 33 G/DL (ref 31–37)
MCV RBC AUTO: 85 FL (ref 80–94)
MODALITY: ABNORMAL
MONOCYTES # BLD AUTO: 0.73 10*3/MM3 (ref 0–1)
MONOCYTES NFR BLD AUTO: 4.1 % (ref 3–8)
NEUTROPHILS # BLD AUTO: 15.89 10*3/MM3 (ref 1.5–8.3)
NEUTROPHILS NFR BLD AUTO: 88.1 % (ref 45–70)
NRBC BLD MANUAL-RTO: 0 /100 WBC (ref 0–0)
PCO2 BLDA: 27.2 MM HG (ref 35–45)
PCO2 TEMP ADJ BLD: 27.2 MM HG (ref 35–45)
PH BLDA: 7.38 PH UNITS (ref 7.35–7.45)
PH, TEMP CORRECTED: 7.38 PH UNITS (ref 7.35–7.45)
PLATELET # BLD AUTO: 459 10*3/MM3 (ref 140–500)
PMV BLD AUTO: 9.5 FL (ref 7.4–10.4)
PO2 BLDA: 76.6 MM HG (ref 83–108)
PO2 TEMP ADJ BLD: 76.6 MM HG (ref 83–108)
POTASSIUM BLD-SCNC: 3.4 MMOL/L (ref 3.5–5.2)
POTASSIUM BLD-SCNC: 3.5 MMOL/L (ref 3.5–5.2)
PROCALCITONIN SERPL-MCNC: 4.17 NG/ML (ref 0.1–0.25)
PROT UR-MCNC: 450 MG/DL
RBC # BLD AUTO: 4.21 10*6/MM3 (ref 4.7–6.1)
SAO2 % BLDCOA: 95.5 % (ref 94–99)
SODIUM BLD-SCNC: 135 MMOL/L (ref 136–145)
SODIUM BLD-SCNC: 138 MMOL/L (ref 136–145)
SODIUM UR-SCNC: 41 MMOL/L
VANCOMYCIN SERPL-MCNC: 8 MCG/ML (ref 5–40)
VENTILATOR MODE: ABNORMAL
WBC NRBC COR # BLD: 18.02 10*3/MM3 (ref 4.8–10.8)

## 2018-01-18 PROCEDURE — 84300 ASSAY OF URINE SODIUM: CPT | Performed by: INTERNAL MEDICINE

## 2018-01-18 PROCEDURE — 25010000002 HYDRALAZINE PER 20 MG: Performed by: NURSE PRACTITIONER

## 2018-01-18 PROCEDURE — 82550 ASSAY OF CK (CPK): CPT | Performed by: INTERNAL MEDICINE

## 2018-01-18 PROCEDURE — 82962 GLUCOSE BLOOD TEST: CPT

## 2018-01-18 PROCEDURE — 83605 ASSAY OF LACTIC ACID: CPT | Performed by: HOSPITALIST

## 2018-01-18 PROCEDURE — 63710000001 INSULIN ASPART PER 5 UNITS: Performed by: HOSPITALIST

## 2018-01-18 PROCEDURE — 36600 WITHDRAWAL OF ARTERIAL BLOOD: CPT

## 2018-01-18 PROCEDURE — 25010000002 VANCOMYCIN PER 500 MG: Performed by: INTERNAL MEDICINE

## 2018-01-18 PROCEDURE — 87449 NOS EACH ORGANISM AG IA: CPT | Performed by: HOSPITALIST

## 2018-01-18 PROCEDURE — 87147 CULTURE TYPE IMMUNOLOGIC: CPT | Performed by: HOSPITALIST

## 2018-01-18 PROCEDURE — 82436 ASSAY OF URINE CHLORIDE: CPT | Performed by: INTERNAL MEDICINE

## 2018-01-18 PROCEDURE — 87186 SC STD MICRODIL/AGAR DIL: CPT | Performed by: HOSPITALIST

## 2018-01-18 PROCEDURE — 87324 CLOSTRIDIUM AG IA: CPT | Performed by: HOSPITALIST

## 2018-01-18 PROCEDURE — 85652 RBC SED RATE AUTOMATED: CPT | Performed by: HOSPITALIST

## 2018-01-18 PROCEDURE — 87205 SMEAR GRAM STAIN: CPT | Performed by: HOSPITALIST

## 2018-01-18 PROCEDURE — 25010000002 HYDROMORPHONE PER 4 MG: Performed by: INTERNAL MEDICINE

## 2018-01-18 PROCEDURE — 97165 OT EVAL LOW COMPLEX 30 MIN: CPT

## 2018-01-18 PROCEDURE — 63710000001 INSULIN DETEMIR PER 5 UNITS: Performed by: HOSPITALIST

## 2018-01-18 PROCEDURE — 99223 1ST HOSP IP/OBS HIGH 75: CPT

## 2018-01-18 PROCEDURE — 85025 COMPLETE CBC W/AUTO DIFF WBC: CPT | Performed by: HOSPITALIST

## 2018-01-18 PROCEDURE — 82803 BLOOD GASES ANY COMBINATION: CPT

## 2018-01-18 PROCEDURE — 99233 SBSQ HOSP IP/OBS HIGH 50: CPT | Performed by: HOSPITALIST

## 2018-01-18 PROCEDURE — 80048 BASIC METABOLIC PNL TOTAL CA: CPT | Performed by: HOSPITALIST

## 2018-01-18 PROCEDURE — 80202 ASSAY OF VANCOMYCIN: CPT | Performed by: INTERNAL MEDICINE

## 2018-01-18 PROCEDURE — 25010000002 ENOXAPARIN PER 10 MG: Performed by: INTERNAL MEDICINE

## 2018-01-18 PROCEDURE — 71046 X-RAY EXAM CHEST 2 VIEWS: CPT

## 2018-01-18 PROCEDURE — 84156 ASSAY OF PROTEIN URINE: CPT | Performed by: INTERNAL MEDICINE

## 2018-01-18 PROCEDURE — 76775 US EXAM ABDO BACK WALL LIM: CPT

## 2018-01-18 PROCEDURE — 82570 ASSAY OF URINE CREATININE: CPT | Performed by: INTERNAL MEDICINE

## 2018-01-18 PROCEDURE — 97161 PT EVAL LOW COMPLEX 20 MIN: CPT

## 2018-01-18 PROCEDURE — 25010000002 PIPERACILLIN-TAZOBACTAM: Performed by: INTERNAL MEDICINE

## 2018-01-18 PROCEDURE — 84145 PROCALCITONIN (PCT): CPT | Performed by: HOSPITALIST

## 2018-01-18 PROCEDURE — 87070 CULTURE OTHR SPECIMN AEROBIC: CPT | Performed by: HOSPITALIST

## 2018-01-18 PROCEDURE — 87205 SMEAR GRAM STAIN: CPT | Performed by: INTERNAL MEDICINE

## 2018-01-18 PROCEDURE — 86140 C-REACTIVE PROTEIN: CPT | Performed by: HOSPITALIST

## 2018-01-18 RX ORDER — BUPROPION HYDROCHLORIDE 100 MG/1
200 TABLET, EXTENDED RELEASE ORAL DAILY
Status: DISCONTINUED | OUTPATIENT
Start: 2018-01-18 | End: 2018-01-25 | Stop reason: HOSPADM

## 2018-01-18 RX ORDER — HYDRALAZINE HYDROCHLORIDE 25 MG/1
25 TABLET, FILM COATED ORAL EVERY 8 HOURS SCHEDULED
Status: DISCONTINUED | OUTPATIENT
Start: 2018-01-18 | End: 2018-01-19

## 2018-01-18 RX ORDER — OLANZAPINE 5 MG/1
5 TABLET ORAL NIGHTLY
Status: DISCONTINUED | OUTPATIENT
Start: 2018-01-18 | End: 2018-01-25 | Stop reason: HOSPADM

## 2018-01-18 RX ORDER — HYDROCODONE BITARTRATE AND ACETAMINOPHEN 5; 325 MG/1; MG/1
1 TABLET ORAL EVERY 6 HOURS PRN
Status: DISCONTINUED | OUTPATIENT
Start: 2018-01-18 | End: 2018-01-24

## 2018-01-18 RX ORDER — CITALOPRAM 20 MG/1
20 TABLET ORAL DAILY
Status: DISCONTINUED | OUTPATIENT
Start: 2018-01-18 | End: 2018-01-25 | Stop reason: HOSPADM

## 2018-01-18 RX ORDER — POTASSIUM CHLORIDE 20 MEQ/1
40 TABLET, EXTENDED RELEASE ORAL ONCE
Status: COMPLETED | OUTPATIENT
Start: 2018-01-18 | End: 2018-01-18

## 2018-01-18 RX ORDER — AMLODIPINE BESYLATE 5 MG/1
5 TABLET ORAL
Status: DISCONTINUED | OUTPATIENT
Start: 2018-01-18 | End: 2018-01-19

## 2018-01-18 RX ORDER — GABAPENTIN 300 MG/1
300 CAPSULE ORAL EVERY 12 HOURS SCHEDULED
Status: DISCONTINUED | OUTPATIENT
Start: 2018-01-18 | End: 2018-01-25 | Stop reason: HOSPADM

## 2018-01-18 RX ORDER — TAMSULOSIN HYDROCHLORIDE 0.4 MG/1
0.4 CAPSULE ORAL DAILY
Status: DISCONTINUED | OUTPATIENT
Start: 2018-01-18 | End: 2018-01-18

## 2018-01-18 RX ORDER — TAMSULOSIN HYDROCHLORIDE 0.4 MG/1
0.4 CAPSULE ORAL NIGHTLY
Status: DISCONTINUED | OUTPATIENT
Start: 2018-01-18 | End: 2018-01-25 | Stop reason: HOSPADM

## 2018-01-18 RX ORDER — METOPROLOL TARTRATE 50 MG/1
50 TABLET, FILM COATED ORAL EVERY 12 HOURS SCHEDULED
Status: DISCONTINUED | OUTPATIENT
Start: 2018-01-18 | End: 2018-01-25 | Stop reason: HOSPADM

## 2018-01-18 RX ORDER — TRAZODONE HYDROCHLORIDE 50 MG/1
100 TABLET ORAL NIGHTLY
Status: DISCONTINUED | OUTPATIENT
Start: 2018-01-18 | End: 2018-01-25 | Stop reason: HOSPADM

## 2018-01-18 RX ORDER — DEXTROSE MONOHYDRATE 50 MG/ML
INJECTION, SOLUTION INTRAVENOUS
Status: DISPENSED
Start: 2018-01-18 | End: 2018-01-19

## 2018-01-18 RX ADMIN — HYDROMORPHONE HYDROCHLORIDE 1 MG: 2 INJECTION INTRAMUSCULAR; INTRAVENOUS; SUBCUTANEOUS at 03:37

## 2018-01-18 RX ADMIN — METOPROLOL TARTRATE 50 MG: 50 TABLET, FILM COATED ORAL at 21:02

## 2018-01-18 RX ADMIN — AMLODIPINE BESYLATE 5 MG: 5 TABLET ORAL at 11:25

## 2018-01-18 RX ADMIN — INSULIN ASPART 2 UNITS: 100 INJECTION, SOLUTION INTRAVENOUS; SUBCUTANEOUS at 08:48

## 2018-01-18 RX ADMIN — TAMSULOSIN HYDROCHLORIDE 0.4 MG: 0.4 CAPSULE ORAL at 21:02

## 2018-01-18 RX ADMIN — POTASSIUM CHLORIDE 40 MEQ: 1500 TABLET, EXTENDED RELEASE ORAL at 21:02

## 2018-01-18 RX ADMIN — RISPERIDONE 2 MG: 1 TABLET ORAL at 08:42

## 2018-01-18 RX ADMIN — HYDRALAZINE HYDROCHLORIDE 20 MG: 20 INJECTION INTRAMUSCULAR; INTRAVENOUS at 23:59

## 2018-01-18 RX ADMIN — HYDRALAZINE HYDROCHLORIDE 25 MG: 25 TABLET, FILM COATED ORAL at 21:16

## 2018-01-18 RX ADMIN — METOPROLOL TARTRATE 25 MG: 25 TABLET, FILM COATED ORAL at 08:45

## 2018-01-18 RX ADMIN — TRAZODONE HYDROCHLORIDE 100 MG: 50 TABLET ORAL at 21:03

## 2018-01-18 RX ADMIN — PIPERACILLIN SODIUM,TAZOBACTAM SODIUM 3.38 G: 3; .375 INJECTION, POWDER, FOR SOLUTION INTRAVENOUS at 05:48

## 2018-01-18 RX ADMIN — HYDROCODONE BITARTRATE AND ACETAMINOPHEN 1 TABLET: 5; 325 TABLET ORAL at 13:35

## 2018-01-18 RX ADMIN — HYDRALAZINE HYDROCHLORIDE 20 MG: 20 INJECTION INTRAMUSCULAR; INTRAVENOUS at 06:31

## 2018-01-18 RX ADMIN — CLONIDINE HYDROCHLORIDE 0.1 MG: 0.1 TABLET ORAL at 08:44

## 2018-01-18 RX ADMIN — INSULIN DETEMIR 10 UNITS: 100 INJECTION, SOLUTION SUBCUTANEOUS at 21:24

## 2018-01-18 RX ADMIN — INSULIN ASPART 4 UNITS: 100 INJECTION, SOLUTION INTRAVENOUS; SUBCUTANEOUS at 17:29

## 2018-01-18 RX ADMIN — PIPERACILLIN SODIUM,TAZOBACTAM SODIUM 3.38 G: 3; .375 INJECTION, POWDER, FOR SOLUTION INTRAVENOUS at 00:41

## 2018-01-18 RX ADMIN — PIPERACILLIN SODIUM,TAZOBACTAM SODIUM 3.38 G: 3; .375 INJECTION, POWDER, FOR SOLUTION INTRAVENOUS at 12:39

## 2018-01-18 RX ADMIN — SODIUM BICARBONATE: 84 INJECTION, SOLUTION INTRAVENOUS at 21:00

## 2018-01-18 RX ADMIN — METOPROLOL TARTRATE 25 MG: 25 TABLET, FILM COATED ORAL at 11:26

## 2018-01-18 RX ADMIN — SODIUM CHLORIDE 125 ML/HR: 9 INJECTION, SOLUTION INTRAVENOUS at 14:30

## 2018-01-18 RX ADMIN — GABAPENTIN 300 MG: 300 CAPSULE ORAL at 21:01

## 2018-01-18 RX ADMIN — FAMOTIDINE 20 MG: 10 INJECTION, SOLUTION INTRAVENOUS at 08:43

## 2018-01-18 RX ADMIN — HYDROMORPHONE HYDROCHLORIDE 1 MG: 2 INJECTION INTRAMUSCULAR; INTRAVENOUS; SUBCUTANEOUS at 08:43

## 2018-01-18 RX ADMIN — INSULIN ASPART 4 UNITS: 100 INJECTION, SOLUTION INTRAVENOUS; SUBCUTANEOUS at 21:02

## 2018-01-18 RX ADMIN — CLONIDINE HYDROCHLORIDE 0.1 MG: 0.1 TABLET ORAL at 21:01

## 2018-01-18 RX ADMIN — SODIUM CHLORIDE 1000 MG: 900 INJECTION, SOLUTION INTRAVENOUS at 08:44

## 2018-01-18 RX ADMIN — PIPERACILLIN SODIUM,TAZOBACTAM SODIUM 3.38 G: 3; .375 INJECTION, POWDER, FOR SOLUTION INTRAVENOUS at 23:59

## 2018-01-18 RX ADMIN — CITALOPRAM HYDROBROMIDE 20 MG: 20 TABLET ORAL at 17:31

## 2018-01-18 RX ADMIN — PIPERACILLIN SODIUM,TAZOBACTAM SODIUM 3.38 G: 3; .375 INJECTION, POWDER, FOR SOLUTION INTRAVENOUS at 18:04

## 2018-01-18 RX ADMIN — BUPROPION HYDROCHLORIDE 200 MG: 100 TABLET, FILM COATED, EXTENDED RELEASE ORAL at 17:29

## 2018-01-18 RX ADMIN — SODIUM CHLORIDE 125 ML/HR: 9 INJECTION, SOLUTION INTRAVENOUS at 03:31

## 2018-01-18 RX ADMIN — HYDROCODONE BITARTRATE AND ACETAMINOPHEN 1 TABLET: 5; 325 TABLET ORAL at 21:16

## 2018-01-18 RX ADMIN — HYDRALAZINE HYDROCHLORIDE 20 MG: 20 INJECTION INTRAMUSCULAR; INTRAVENOUS at 01:25

## 2018-01-18 RX ADMIN — OLANZAPINE 5 MG: 5 TABLET, FILM COATED ORAL at 21:02

## 2018-01-18 RX ADMIN — HYDRALAZINE HYDROCHLORIDE 25 MG: 25 TABLET, FILM COATED ORAL at 14:54

## 2018-01-18 RX ADMIN — HYDRALAZINE HYDROCHLORIDE 20 MG: 20 INJECTION INTRAMUSCULAR; INTRAVENOUS at 12:39

## 2018-01-18 RX ADMIN — ENOXAPARIN SODIUM 40 MG: 40 INJECTION SUBCUTANEOUS at 05:47

## 2018-01-19 ENCOUNTER — ANESTHESIA (OUTPATIENT)
Dept: PERIOP | Facility: HOSPITAL | Age: 68
End: 2018-01-19

## 2018-01-19 ENCOUNTER — APPOINTMENT (OUTPATIENT)
Dept: NUCLEAR MEDICINE | Facility: HOSPITAL | Age: 68
End: 2018-01-19

## 2018-01-19 ENCOUNTER — INPATIENT HOSPITAL (OUTPATIENT)
Dept: URBAN - METROPOLITAN AREA HOSPITAL 27 | Facility: HOSPITAL | Age: 68
End: 2018-01-19
Payer: MEDICARE

## 2018-01-19 ENCOUNTER — ANESTHESIA EVENT (OUTPATIENT)
Dept: PERIOP | Facility: HOSPITAL | Age: 68
End: 2018-01-19

## 2018-01-19 DIAGNOSIS — K29.50 UNSPECIFIED CHRONIC GASTRITIS WITHOUT BLEEDING: ICD-10-CM

## 2018-01-19 DIAGNOSIS — K31.89 OTHER DISEASES OF STOMACH AND DUODENUM: ICD-10-CM

## 2018-01-19 DIAGNOSIS — K29.80 DUODENITIS WITHOUT BLEEDING: ICD-10-CM

## 2018-01-19 DIAGNOSIS — R10.9 UNSPECIFIED ABDOMINAL PAIN: ICD-10-CM

## 2018-01-19 DIAGNOSIS — T18.2XXA FOREIGN BODY IN STOMACH, INITIAL ENCOUNTER: ICD-10-CM

## 2018-01-19 DIAGNOSIS — K20.8 OTHER ESOPHAGITIS: ICD-10-CM

## 2018-01-19 DIAGNOSIS — K26.9 DUODENAL ULCER, UNSPECIFIED AS ACUTE OR CHRONIC, WITHOUT HEM: ICD-10-CM

## 2018-01-19 DIAGNOSIS — R68.81 EARLY SATIETY: ICD-10-CM

## 2018-01-19 DIAGNOSIS — K22.10 ULCER OF ESOPHAGUS WITHOUT BLEEDING: ICD-10-CM

## 2018-01-19 PROBLEM — N18.4 CKD (CHRONIC KIDNEY DISEASE) STAGE 4, GFR 15-29 ML/MIN (HCC): Status: ACTIVE | Noted: 2018-01-19

## 2018-01-19 PROBLEM — E11.29 DM (DIABETES MELLITUS) TYPE II CONTROLLED WITH RENAL MANIFESTATION (HCC): Status: ACTIVE | Noted: 2018-01-19

## 2018-01-19 PROBLEM — N18.9 ANEMIA OF CHRONIC RENAL FAILURE: Status: ACTIVE | Noted: 2018-01-19

## 2018-01-19 PROBLEM — R80.9 PROTEINURIA: Status: ACTIVE | Noted: 2018-01-19

## 2018-01-19 PROBLEM — N17.9 ARF (ACUTE RENAL FAILURE) (HCC): Status: ACTIVE | Noted: 2018-01-19

## 2018-01-19 PROBLEM — D63.1 ANEMIA OF CHRONIC RENAL FAILURE: Status: ACTIVE | Noted: 2018-01-19

## 2018-01-19 LAB
ALBUMIN SERPL-MCNC: 2.2 G/DL (ref 3.5–5.2)
ANION GAP SERPL CALCULATED.3IONS-SCNC: 15.8 MMOL/L
BASOPHILS # BLD AUTO: 0.04 10*3/MM3 (ref 0–0.2)
BASOPHILS NFR BLD AUTO: 0.3 % (ref 0–2)
BUN BLD-MCNC: 38 MG/DL (ref 8–23)
BUN/CREAT SERPL: 11 (ref 7–25)
CALCIUM SPEC-SCNC: 8.3 MG/DL (ref 8.8–10.5)
CHLORIDE SERPL-SCNC: 105 MMOL/L (ref 98–107)
CO2 SERPL-SCNC: 20.2 MMOL/L (ref 22–29)
CREAT BLD-MCNC: 3.44 MG/DL (ref 0.76–1.27)
CRP SERPL-MCNC: 5.69 MG/DL (ref 0–0.5)
DEPRECATED RDW RBC AUTO: 41.2 FL (ref 37–54)
DEPRECATED RDW RBC AUTO: 42 FL (ref 37–54)
EOSINOPHIL # BLD AUTO: 0.01 10*3/MM3 (ref 0.1–0.3)
EOSINOPHIL NFR BLD AUTO: 0.1 % (ref 0–4)
EOSINOPHIL SPEC QL MICRO: 0 % EOS/100 CELLS (ref 0–0)
ERYTHROCYTE [DISTWIDTH] IN BLOOD BY AUTOMATED COUNT: 13.3 % (ref 11.5–14.5)
ERYTHROCYTE [DISTWIDTH] IN BLOOD BY AUTOMATED COUNT: 13.5 % (ref 11.5–14.5)
GFR SERPL CREATININE-BSD FRML MDRD: 18 ML/MIN/1.73
GLUCOSE BLD-MCNC: 127 MG/DL (ref 65–99)
GLUCOSE BLDC GLUCOMTR-MCNC: 205 MG/DL (ref 70–130)
GLUCOSE BLDC GLUCOMTR-MCNC: 225 MG/DL (ref 70–130)
HCT VFR BLD AUTO: 31.8 % (ref 42–52)
HCT VFR BLD AUTO: 32.9 % (ref 42–52)
HGB BLD-MCNC: 10.6 G/DL (ref 14–18)
HGB BLD-MCNC: 11.1 G/DL (ref 14–18)
IMM GRANULOCYTES # BLD: 0.46 10*3/MM3 (ref 0–0.03)
IMM GRANULOCYTES NFR BLD: 3 % (ref 0–0.5)
LYMPHOCYTES # BLD AUTO: 1.33 10*3/MM3 (ref 0.6–4.8)
LYMPHOCYTES NFR BLD AUTO: 8.7 % (ref 20–45)
MCH RBC QN AUTO: 28.1 PG (ref 27–31)
MCH RBC QN AUTO: 28.8 PG (ref 27–31)
MCHC RBC AUTO-ENTMCNC: 33.3 G/DL (ref 31–37)
MCHC RBC AUTO-ENTMCNC: 33.7 G/DL (ref 31–37)
MCV RBC AUTO: 84.4 FL (ref 80–94)
MCV RBC AUTO: 85.2 FL (ref 80–94)
MONOCYTES # BLD AUTO: 0.96 10*3/MM3 (ref 0–1)
MONOCYTES NFR BLD AUTO: 6.3 % (ref 3–8)
NEUTROPHILS # BLD AUTO: 12.43 10*3/MM3 (ref 1.5–8.3)
NEUTROPHILS NFR BLD AUTO: 81.6 % (ref 45–70)
NRBC BLD MANUAL-RTO: 0 /100 WBC (ref 0–0)
PHOSPHATE SERPL-MCNC: 5.2 MG/DL (ref 2.7–4.5)
PLATELET # BLD AUTO: 326 10*3/MM3 (ref 140–500)
PLATELET # BLD AUTO: 345 10*3/MM3 (ref 140–500)
PMV BLD AUTO: 9.2 FL (ref 7.4–10.4)
PMV BLD AUTO: 9.6 FL (ref 7.4–10.4)
POTASSIUM BLD-SCNC: 3 MMOL/L (ref 3.5–5.2)
RBC # BLD AUTO: 3.77 10*6/MM3 (ref 4.7–6.1)
RBC # BLD AUTO: 3.86 10*6/MM3 (ref 4.7–6.1)
SODIUM BLD-SCNC: 141 MMOL/L (ref 136–145)
VANCOMYCIN SERPL-MCNC: 14.9 MCG/ML (ref 5–40)
WBC NRBC COR # BLD: 12.51 10*3/MM3 (ref 4.8–10.8)
WBC NRBC COR # BLD: 15.23 10*3/MM3 (ref 4.8–10.8)

## 2018-01-19 PROCEDURE — 43239 EGD BIOPSY SINGLE/MULTIPLE: CPT

## 2018-01-19 PROCEDURE — 85027 COMPLETE CBC AUTOMATED: CPT | Performed by: INTERNAL MEDICINE

## 2018-01-19 PROCEDURE — 80202 ASSAY OF VANCOMYCIN: CPT | Performed by: INTERNAL MEDICINE

## 2018-01-19 PROCEDURE — 25010000002 HYDRALAZINE PER 20 MG: Performed by: NURSE PRACTITIONER

## 2018-01-19 PROCEDURE — 25010000002 PIPERACILLIN-TAZOBACTAM: Performed by: INTERNAL MEDICINE

## 2018-01-19 PROCEDURE — 78226 HEPATOBILIARY SYSTEM IMAGING: CPT

## 2018-01-19 PROCEDURE — 0DB68ZX EXCISION OF STOMACH, VIA NATURAL OR ARTIFICIAL OPENING ENDOSCOPIC, DIAGNOSTIC: ICD-10-PCS | Performed by: INTERNAL MEDICINE

## 2018-01-19 PROCEDURE — A9537 TC99M MEBROFENIN: HCPCS | Performed by: INTERNAL MEDICINE

## 2018-01-19 PROCEDURE — 25010000002 PROPOFOL 10 MG/ML EMULSION: Performed by: NURSE ANESTHETIST, CERTIFIED REGISTERED

## 2018-01-19 PROCEDURE — 63710000001 INSULIN DETEMIR PER 5 UNITS: Performed by: HOSPITALIST

## 2018-01-19 PROCEDURE — 0DB98ZX EXCISION OF DUODENUM, VIA NATURAL OR ARTIFICIAL OPENING ENDOSCOPIC, DIAGNOSTIC: ICD-10-PCS | Performed by: INTERNAL MEDICINE

## 2018-01-19 PROCEDURE — 0 TECHNETIUM TC 99M MEBROFENIN KIT: Performed by: INTERNAL MEDICINE

## 2018-01-19 PROCEDURE — 25010000002 VANCOMYCIN PER 500 MG: Performed by: INTERNAL MEDICINE

## 2018-01-19 PROCEDURE — 25010000002 ENOXAPARIN PER 10 MG: Performed by: INTERNAL MEDICINE

## 2018-01-19 PROCEDURE — 63710000001 INSULIN ASPART PER 5 UNITS: Performed by: HOSPITALIST

## 2018-01-19 PROCEDURE — 0DB38ZX EXCISION OF LOWER ESOPHAGUS, VIA NATURAL OR ARTIFICIAL OPENING ENDOSCOPIC, DIAGNOSTIC: ICD-10-PCS | Performed by: INTERNAL MEDICINE

## 2018-01-19 PROCEDURE — 80069 RENAL FUNCTION PANEL: CPT | Performed by: INTERNAL MEDICINE

## 2018-01-19 PROCEDURE — 82962 GLUCOSE BLOOD TEST: CPT

## 2018-01-19 PROCEDURE — 99232 SBSQ HOSP IP/OBS MODERATE 35: CPT | Performed by: HOSPITALIST

## 2018-01-19 PROCEDURE — 96125 COGNITIVE TEST BY HC PRO: CPT

## 2018-01-19 PROCEDURE — 85025 COMPLETE CBC W/AUTO DIFF WBC: CPT | Performed by: HOSPITALIST

## 2018-01-19 RX ORDER — SODIUM BICARBONATE 650 MG/1
650 TABLET ORAL 4 TIMES DAILY
Status: DISCONTINUED | OUTPATIENT
Start: 2018-01-19 | End: 2018-01-22

## 2018-01-19 RX ORDER — POTASSIUM CHLORIDE 20 MEQ/1
40 TABLET, EXTENDED RELEASE ORAL ONCE
Status: COMPLETED | OUTPATIENT
Start: 2018-01-19 | End: 2018-01-19

## 2018-01-19 RX ORDER — PANTOPRAZOLE SODIUM 40 MG/1
40 TABLET, DELAYED RELEASE ORAL
Status: DISCONTINUED | OUTPATIENT
Start: 2018-01-19 | End: 2018-01-25 | Stop reason: HOSPADM

## 2018-01-19 RX ORDER — SODIUM CHLORIDE, SODIUM LACTATE, POTASSIUM CHLORIDE, CALCIUM CHLORIDE 600; 310; 30; 20 MG/100ML; MG/100ML; MG/100ML; MG/100ML
9 INJECTION, SOLUTION INTRAVENOUS CONTINUOUS PRN
Status: DISCONTINUED | OUTPATIENT
Start: 2018-01-19 | End: 2018-01-25 | Stop reason: HOSPADM

## 2018-01-19 RX ORDER — HYDRALAZINE HYDROCHLORIDE 50 MG/1
50 TABLET, FILM COATED ORAL EVERY 8 HOURS SCHEDULED
Status: DISCONTINUED | OUTPATIENT
Start: 2018-01-19 | End: 2018-01-23

## 2018-01-19 RX ORDER — PROPOFOL 10 MG/ML
VIAL (ML) INTRAVENOUS AS NEEDED
Status: DISCONTINUED | OUTPATIENT
Start: 2018-01-19 | End: 2018-01-19 | Stop reason: SURG

## 2018-01-19 RX ORDER — KIT FOR THE PREPARATION OF TECHNETIUM TC 99M MEBROFENIN 45 MG/10ML
1 INJECTION, POWDER, LYOPHILIZED, FOR SOLUTION INTRAVENOUS
Status: COMPLETED | OUTPATIENT
Start: 2018-01-19 | End: 2018-01-19

## 2018-01-19 RX ORDER — SODIUM CHLORIDE 9 MG/ML
50 INJECTION, SOLUTION INTRAVENOUS CONTINUOUS
Status: DISCONTINUED | OUTPATIENT
Start: 2018-01-19 | End: 2018-01-20

## 2018-01-19 RX ORDER — AMLODIPINE BESYLATE 5 MG/1
10 TABLET ORAL
Status: DISCONTINUED | OUTPATIENT
Start: 2018-01-20 | End: 2018-01-25 | Stop reason: HOSPADM

## 2018-01-19 RX ORDER — LIDOCAINE HYDROCHLORIDE 20 MG/ML
INJECTION, SOLUTION INFILTRATION; PERINEURAL AS NEEDED
Status: DISCONTINUED | OUTPATIENT
Start: 2018-01-19 | End: 2018-01-19 | Stop reason: SURG

## 2018-01-19 RX ORDER — MAGNESIUM HYDROXIDE 1200 MG/15ML
LIQUID ORAL AS NEEDED
Status: DISCONTINUED | OUTPATIENT
Start: 2018-01-19 | End: 2018-01-19 | Stop reason: HOSPADM

## 2018-01-19 RX ADMIN — PROPOFOL 50 MG: 10 INJECTION, EMULSION INTRAVENOUS at 11:55

## 2018-01-19 RX ADMIN — ENOXAPARIN SODIUM 40 MG: 40 INJECTION SUBCUTANEOUS at 05:06

## 2018-01-19 RX ADMIN — PIPERACILLIN SODIUM,TAZOBACTAM SODIUM 3.38 G: 3; .375 INJECTION, POWDER, FOR SOLUTION INTRAVENOUS at 14:21

## 2018-01-19 RX ADMIN — GABAPENTIN 300 MG: 300 CAPSULE ORAL at 21:47

## 2018-01-19 RX ADMIN — HYDROCODONE BITARTRATE AND ACETAMINOPHEN 1 TABLET: 5; 325 TABLET ORAL at 21:47

## 2018-01-19 RX ADMIN — INSULIN ASPART 4 UNITS: 100 INJECTION, SOLUTION INTRAVENOUS; SUBCUTANEOUS at 21:47

## 2018-01-19 RX ADMIN — HYDRALAZINE HYDROCHLORIDE 25 MG: 25 TABLET, FILM COATED ORAL at 14:21

## 2018-01-19 RX ADMIN — CITALOPRAM HYDROBROMIDE 20 MG: 20 TABLET ORAL at 14:13

## 2018-01-19 RX ADMIN — BUPROPION HYDROCHLORIDE 200 MG: 100 TABLET, FILM COATED, EXTENDED RELEASE ORAL at 14:11

## 2018-01-19 RX ADMIN — SODIUM BICARBONATE 650 MG TABLET 650 MG: at 21:52

## 2018-01-19 RX ADMIN — CLONIDINE HYDROCHLORIDE 0.1 MG: 0.1 TABLET ORAL at 14:14

## 2018-01-19 RX ADMIN — POTASSIUM CHLORIDE 40 MEQ: 1500 TABLET, EXTENDED RELEASE ORAL at 15:18

## 2018-01-19 RX ADMIN — CLONIDINE HYDROCHLORIDE 0.1 MG: 0.1 TABLET ORAL at 21:51

## 2018-01-19 RX ADMIN — OLANZAPINE 5 MG: 5 TABLET, FILM COATED ORAL at 21:52

## 2018-01-19 RX ADMIN — SODIUM CHLORIDE 1000 MG: 900 INJECTION, SOLUTION INTRAVENOUS at 15:11

## 2018-01-19 RX ADMIN — PIPERACILLIN SODIUM,TAZOBACTAM SODIUM 3.38 G: 3; .375 INJECTION, POWDER, FOR SOLUTION INTRAVENOUS at 06:29

## 2018-01-19 RX ADMIN — HYDRALAZINE HYDROCHLORIDE 20 MG: 20 INJECTION INTRAMUSCULAR; INTRAVENOUS at 05:09

## 2018-01-19 RX ADMIN — PANTOPRAZOLE SODIUM 40 MG: 40 TABLET, DELAYED RELEASE ORAL at 17:31

## 2018-01-19 RX ADMIN — PROPOFOL 50 MG: 10 INJECTION, EMULSION INTRAVENOUS at 11:58

## 2018-01-19 RX ADMIN — INSULIN DETEMIR 10 UNITS: 100 INJECTION, SOLUTION SUBCUTANEOUS at 21:46

## 2018-01-19 RX ADMIN — MEBROFENIN 1 DOSE: 45 INJECTION, POWDER, LYOPHILIZED, FOR SOLUTION INTRAVENOUS at 07:45

## 2018-01-19 RX ADMIN — HYDRALAZINE HYDROCHLORIDE 20 MG: 20 INJECTION INTRAMUSCULAR; INTRAVENOUS at 15:18

## 2018-01-19 RX ADMIN — FAMOTIDINE 20 MG: 10 INJECTION, SOLUTION INTRAVENOUS at 09:16

## 2018-01-19 RX ADMIN — GABAPENTIN 300 MG: 300 CAPSULE ORAL at 14:20

## 2018-01-19 RX ADMIN — SODIUM CHLORIDE 100 ML/HR: 9 INJECTION, SOLUTION INTRAVENOUS at 23:02

## 2018-01-19 RX ADMIN — METOPROLOL TARTRATE 50 MG: 50 TABLET, FILM COATED ORAL at 14:13

## 2018-01-19 RX ADMIN — AMLODIPINE BESYLATE 5 MG: 5 TABLET ORAL at 14:14

## 2018-01-19 RX ADMIN — INSULIN ASPART 4 UNITS: 100 INJECTION, SOLUTION INTRAVENOUS; SUBCUTANEOUS at 17:31

## 2018-01-19 RX ADMIN — LIDOCAINE HYDROCHLORIDE 100 MG: 20 INJECTION, SOLUTION INFILTRATION; PERINEURAL at 11:51

## 2018-01-19 RX ADMIN — PROPOFOL 50 MG: 10 INJECTION, EMULSION INTRAVENOUS at 12:02

## 2018-01-19 RX ADMIN — METOPROLOL TARTRATE 50 MG: 50 TABLET, FILM COATED ORAL at 21:50

## 2018-01-19 RX ADMIN — HYDRALAZINE HYDROCHLORIDE 50 MG: 50 TABLET ORAL at 21:51

## 2018-01-19 RX ADMIN — PIPERACILLIN SODIUM,TAZOBACTAM SODIUM 3.38 G: 3; .375 INJECTION, POWDER, FOR SOLUTION INTRAVENOUS at 21:56

## 2018-01-19 RX ADMIN — SODIUM CHLORIDE, POTASSIUM CHLORIDE, SODIUM LACTATE AND CALCIUM CHLORIDE 9 ML/HR: 600; 310; 30; 20 INJECTION, SOLUTION INTRAVENOUS at 11:28

## 2018-01-19 RX ADMIN — SODIUM BICARBONATE 650 MG TABLET 650 MG: at 17:31

## 2018-01-19 RX ADMIN — SODIUM CHLORIDE, POTASSIUM CHLORIDE, SODIUM LACTATE AND CALCIUM CHLORIDE: 600; 310; 30; 20 INJECTION, SOLUTION INTRAVENOUS at 11:02

## 2018-01-19 RX ADMIN — TAMSULOSIN HYDROCHLORIDE 0.4 MG: 0.4 CAPSULE ORAL at 21:51

## 2018-01-19 RX ADMIN — HYDROCODONE BITARTRATE AND ACETAMINOPHEN 1 TABLET: 5; 325 TABLET ORAL at 16:22

## 2018-01-19 RX ADMIN — SODIUM BICARBONATE: 84 INJECTION, SOLUTION INTRAVENOUS at 09:17

## 2018-01-19 RX ADMIN — PROPOFOL 100 MG: 10 INJECTION, EMULSION INTRAVENOUS at 11:53

## 2018-01-19 RX ADMIN — TRAZODONE HYDROCHLORIDE 100 MG: 50 TABLET ORAL at 21:50

## 2018-01-19 NOTE — ANESTHESIA POSTPROCEDURE EVALUATION
Patient: Kevin Mckeon V    Procedure Summary     Date Anesthesia Start Anesthesia Stop Room / Location    01/19/18 1146 1214 [facility] 1 / [facility]       Procedure Diagnosis Surgeon Provider    ESOPHAGOGASTRODUODENOSCOPY (N/A Esophagus) Early satiety; Ulcerative esophagitis; Gastritis; Duodenitis  (Early satiety [R68.81]) MD He Bartholomew CRNA          Anesthesia Type: MAC  Last vitals  BP   152/90 (01/19/18 1230)   Temp   97.9 °F (36.6 °C) (01/19/18 1215)   Pulse   87 (01/19/18 1230)   Resp   16 (01/19/18 1230)     SpO2   95 % (01/19/18 1230)     Post Anesthesia Care and Evaluation    Patient location during evaluation: PHASE II  Patient participation: complete - patient participated  Level of consciousness: awake  Pain management: adequate  Airway patency: patent  Anesthetic complications: No anesthetic complications  PONV Status: none  Cardiovascular status: acceptable and hemodynamically stable  Respiratory status: acceptable  Hydration status: acceptable

## 2018-01-19 NOTE — ANESTHESIA PREPROCEDURE EVALUATION
Anesthesia Evaluation     Patient summary reviewed and Nursing notes reviewed   NPO Solid Status: > 8 hours  NPO Liquid Status: > 8 hours     Airway   Mallampati: III  possible difficult intubation  Comment: FULL BEARD  Dental    (+) poor dentition    Comment: MULT MISSING    Pulmonary     breath sounds clear to auscultation  (+) a smoker Former, COPD, decreased breath sounds (IN BASES),   Cardiovascular - normal exam    ECG reviewed    (+) hypertension,       Neuro/Psych- negative ROS  Historian: POOR COGNITION.  GI/Hepatic/Renal/Endo    (+)  renal disease, diabetes mellitus well controlled,     ROS Comment: ABD PAIN    Musculoskeletal     (+) radiculopathy Left lower extremity and Right lower extremity  Abdominal    Substance History      OB/GYN          Other                                              Anesthesia Plan    ASA 3     MAC     intravenous induction   Anesthetic plan and risks discussed with patient.  Use of blood products discussed with patient  Consented to blood products.

## 2018-01-20 ENCOUNTER — INPATIENT HOSPITAL (OUTPATIENT)
Dept: URBAN - METROPOLITAN AREA HOSPITAL 27 | Facility: HOSPITAL | Age: 68
End: 2018-01-20
Payer: MEDICARE

## 2018-01-20 DIAGNOSIS — K22.10 ULCER OF ESOPHAGUS WITHOUT BLEEDING: ICD-10-CM

## 2018-01-20 DIAGNOSIS — K31.84 GASTROPARESIS: ICD-10-CM

## 2018-01-20 PROBLEM — L97.819 ULCER OF RIGHT KNEE (HCC): Status: ACTIVE | Noted: 2018-01-17

## 2018-01-20 LAB
ALBUMIN SERPL-MCNC: 2.1 G/DL (ref 3.5–5.2)
ANION GAP SERPL CALCULATED.3IONS-SCNC: 11.2 MMOL/L
BUN BLD-MCNC: 34 MG/DL (ref 8–23)
BUN/CREAT SERPL: 10 (ref 7–25)
CALCIUM SPEC-SCNC: 8 MG/DL (ref 8.8–10.5)
CHLORIDE SERPL-SCNC: 105 MMOL/L (ref 98–107)
CO2 SERPL-SCNC: 23.8 MMOL/L (ref 22–29)
CREAT BLD-MCNC: 3.41 MG/DL (ref 0.76–1.27)
GFR SERPL CREATININE-BSD FRML MDRD: 18 ML/MIN/1.73
GLUCOSE BLD-MCNC: 132 MG/DL (ref 65–99)
GLUCOSE BLDC GLUCOMTR-MCNC: 230 MG/DL (ref 70–130)
GLUCOSE BLDC GLUCOMTR-MCNC: 258 MG/DL (ref 70–130)
GLUCOSE BLDC GLUCOMTR-MCNC: 302 MG/DL (ref 70–130)
GLUCOSE BLDC GLUCOMTR-MCNC: 94 MG/DL (ref 70–130)
MAGNESIUM SERPL-MCNC: 1.9 MG/DL (ref 1.7–2.5)
PHOSPHATE SERPL-MCNC: 4.8 MG/DL (ref 2.7–4.5)
POTASSIUM BLD-SCNC: 2.7 MMOL/L (ref 3.5–5.2)
POTASSIUM BLD-SCNC: 3.1 MMOL/L (ref 3.5–5.2)
SODIUM BLD-SCNC: 140 MMOL/L (ref 136–145)
VANCOMYCIN SERPL-MCNC: 16.5 MCG/ML (ref 5–40)

## 2018-01-20 PROCEDURE — 80069 RENAL FUNCTION PANEL: CPT | Performed by: INTERNAL MEDICINE

## 2018-01-20 PROCEDURE — 25010000002 PIPERACILLIN-TAZOBACTAM: Performed by: INTERNAL MEDICINE

## 2018-01-20 PROCEDURE — 99231 SBSQ HOSP IP/OBS SF/LOW 25: CPT | Performed by: INTERNAL MEDICINE

## 2018-01-20 PROCEDURE — 63710000001 INSULIN ASPART PER 5 UNITS: Performed by: HOSPITALIST

## 2018-01-20 PROCEDURE — 83735 ASSAY OF MAGNESIUM: CPT | Performed by: INTERNAL MEDICINE

## 2018-01-20 PROCEDURE — 80202 ASSAY OF VANCOMYCIN: CPT | Performed by: INTERNAL MEDICINE

## 2018-01-20 PROCEDURE — 82962 GLUCOSE BLOOD TEST: CPT

## 2018-01-20 PROCEDURE — 25010000002 ENOXAPARIN PER 10 MG: Performed by: INTERNAL MEDICINE

## 2018-01-20 PROCEDURE — 25010000002 VANCOMYCIN PER 500 MG: Performed by: INTERNAL MEDICINE

## 2018-01-20 PROCEDURE — 99221 1ST HOSP IP/OBS SF/LOW 40: CPT | Performed by: SURGERY

## 2018-01-20 PROCEDURE — 84132 ASSAY OF SERUM POTASSIUM: CPT | Performed by: INTERNAL MEDICINE

## 2018-01-20 PROCEDURE — 63710000001 INSULIN DETEMIR PER 5 UNITS: Performed by: HOSPITALIST

## 2018-01-20 PROCEDURE — 99232 SBSQ HOSP IP/OBS MODERATE 35: CPT | Performed by: HOSPITALIST

## 2018-01-20 RX ORDER — POTASSIUM CHLORIDE 20 MEQ/1
40 TABLET, EXTENDED RELEASE ORAL EVERY 4 HOURS
Status: COMPLETED | OUTPATIENT
Start: 2018-01-20 | End: 2018-01-20

## 2018-01-20 RX ORDER — POTASSIUM CHLORIDE 20 MEQ/1
40 TABLET, EXTENDED RELEASE ORAL
Status: COMPLETED | OUTPATIENT
Start: 2018-01-20 | End: 2018-01-20

## 2018-01-20 RX ADMIN — HYDRALAZINE HYDROCHLORIDE 50 MG: 50 TABLET ORAL at 14:10

## 2018-01-20 RX ADMIN — VANCOMYCIN HYDROCHLORIDE 750 MG: 1 INJECTION, POWDER, LYOPHILIZED, FOR SOLUTION INTRAVENOUS at 15:13

## 2018-01-20 RX ADMIN — PIPERACILLIN SODIUM,TAZOBACTAM SODIUM 3.38 G: 3; .375 INJECTION, POWDER, FOR SOLUTION INTRAVENOUS at 14:10

## 2018-01-20 RX ADMIN — PANTOPRAZOLE SODIUM 40 MG: 40 TABLET, DELAYED RELEASE ORAL at 08:27

## 2018-01-20 RX ADMIN — PIPERACILLIN SODIUM,TAZOBACTAM SODIUM 3.38 G: 3; .375 INJECTION, POWDER, FOR SOLUTION INTRAVENOUS at 22:02

## 2018-01-20 RX ADMIN — GABAPENTIN 300 MG: 300 CAPSULE ORAL at 21:04

## 2018-01-20 RX ADMIN — POTASSIUM CHLORIDE 40 MEQ: 1500 TABLET, EXTENDED RELEASE ORAL at 21:05

## 2018-01-20 RX ADMIN — GABAPENTIN 300 MG: 300 CAPSULE ORAL at 08:32

## 2018-01-20 RX ADMIN — HYDROCODONE BITARTRATE AND ACETAMINOPHEN 1 TABLET: 5; 325 TABLET ORAL at 08:28

## 2018-01-20 RX ADMIN — PANTOPRAZOLE SODIUM 40 MG: 40 TABLET, DELAYED RELEASE ORAL at 17:00

## 2018-01-20 RX ADMIN — OLANZAPINE 5 MG: 5 TABLET, FILM COATED ORAL at 21:04

## 2018-01-20 RX ADMIN — AMLODIPINE BESYLATE 10 MG: 5 TABLET ORAL at 08:27

## 2018-01-20 RX ADMIN — TAMSULOSIN HYDROCHLORIDE 0.4 MG: 0.4 CAPSULE ORAL at 21:04

## 2018-01-20 RX ADMIN — INSULIN ASPART 7 UNITS: 100 INJECTION, SOLUTION INTRAVENOUS; SUBCUTANEOUS at 16:59

## 2018-01-20 RX ADMIN — HYDROCODONE BITARTRATE AND ACETAMINOPHEN 1 TABLET: 5; 325 TABLET ORAL at 15:48

## 2018-01-20 RX ADMIN — ENOXAPARIN SODIUM 40 MG: 40 INJECTION SUBCUTANEOUS at 05:16

## 2018-01-20 RX ADMIN — CITALOPRAM HYDROBROMIDE 20 MG: 20 TABLET ORAL at 08:27

## 2018-01-20 RX ADMIN — POTASSIUM CHLORIDE 40 MEQ: 1500 TABLET, EXTENDED RELEASE ORAL at 13:03

## 2018-01-20 RX ADMIN — SODIUM BICARBONATE 650 MG TABLET 650 MG: at 17:01

## 2018-01-20 RX ADMIN — METOPROLOL TARTRATE 50 MG: 50 TABLET, FILM COATED ORAL at 21:06

## 2018-01-20 RX ADMIN — HYDRALAZINE HYDROCHLORIDE 50 MG: 50 TABLET ORAL at 05:17

## 2018-01-20 RX ADMIN — PIPERACILLIN SODIUM,TAZOBACTAM SODIUM 3.38 G: 3; .375 INJECTION, POWDER, FOR SOLUTION INTRAVENOUS at 05:33

## 2018-01-20 RX ADMIN — BUPROPION HYDROCHLORIDE 200 MG: 100 TABLET, FILM COATED, EXTENDED RELEASE ORAL at 08:27

## 2018-01-20 RX ADMIN — POTASSIUM CHLORIDE 40 MEQ: 1500 TABLET, EXTENDED RELEASE ORAL at 22:37

## 2018-01-20 RX ADMIN — HYDROCODONE BITARTRATE AND ACETAMINOPHEN 1 TABLET: 5; 325 TABLET ORAL at 22:10

## 2018-01-20 RX ADMIN — METOPROLOL TARTRATE 50 MG: 50 TABLET, FILM COATED ORAL at 08:28

## 2018-01-20 RX ADMIN — SODIUM BICARBONATE 650 MG TABLET 650 MG: at 12:28

## 2018-01-20 RX ADMIN — CLONIDINE HYDROCHLORIDE 0.1 MG: 0.1 TABLET ORAL at 08:27

## 2018-01-20 RX ADMIN — INSULIN ASPART 4 UNITS: 100 INJECTION, SOLUTION INTRAVENOUS; SUBCUTANEOUS at 12:28

## 2018-01-20 RX ADMIN — POTASSIUM CHLORIDE 40 MEQ: 1500 TABLET, EXTENDED RELEASE ORAL at 17:01

## 2018-01-20 RX ADMIN — SODIUM BICARBONATE 650 MG TABLET 650 MG: at 21:04

## 2018-01-20 RX ADMIN — CLONIDINE HYDROCHLORIDE 0.1 MG: 0.1 TABLET ORAL at 21:04

## 2018-01-20 RX ADMIN — TRAZODONE HYDROCHLORIDE 100 MG: 50 TABLET ORAL at 21:04

## 2018-01-20 RX ADMIN — SODIUM BICARBONATE 650 MG TABLET 650 MG: at 08:27

## 2018-01-20 RX ADMIN — INSULIN ASPART 6 UNITS: 100 INJECTION, SOLUTION INTRAVENOUS; SUBCUTANEOUS at 21:05

## 2018-01-20 RX ADMIN — INSULIN DETEMIR 10 UNITS: 100 INJECTION, SOLUTION SUBCUTANEOUS at 21:06

## 2018-01-20 RX ADMIN — HYDRALAZINE HYDROCHLORIDE 50 MG: 50 TABLET ORAL at 22:01

## 2018-01-21 ENCOUNTER — ANESTHESIA EVENT (OUTPATIENT)
Dept: PERIOP | Facility: HOSPITAL | Age: 68
End: 2018-01-21

## 2018-01-21 ENCOUNTER — ANESTHESIA (OUTPATIENT)
Dept: PERIOP | Facility: HOSPITAL | Age: 68
End: 2018-01-21

## 2018-01-21 LAB
ALBUMIN SERPL-MCNC: 2.1 G/DL (ref 3.5–5.2)
ANION GAP SERPL CALCULATED.3IONS-SCNC: 10.5 MMOL/L
BACTERIA SPEC AEROBE CULT: ABNORMAL
BACTERIA SPEC AEROBE CULT: ABNORMAL
BASOPHILS # BLD AUTO: 0.06 10*3/MM3 (ref 0–0.2)
BASOPHILS NFR BLD AUTO: 0.4 % (ref 0–2)
BUN BLD-MCNC: 32 MG/DL (ref 8–23)
BUN/CREAT SERPL: 8.9 (ref 7–25)
CALCIUM SPEC-SCNC: 7.9 MG/DL (ref 8.8–10.5)
CHLORIDE SERPL-SCNC: 106 MMOL/L (ref 98–107)
CO2 SERPL-SCNC: 23.5 MMOL/L (ref 22–29)
CREAT BLD-MCNC: 3.61 MG/DL (ref 0.76–1.27)
DEPRECATED RDW RBC AUTO: 43.8 FL (ref 37–54)
EOSINOPHIL # BLD AUTO: 0 10*3/MM3 (ref 0.1–0.3)
EOSINOPHIL NFR BLD AUTO: 0 % (ref 0–4)
ERYTHROCYTE [DISTWIDTH] IN BLOOD BY AUTOMATED COUNT: 13.5 % (ref 11.5–14.5)
GFR SERPL CREATININE-BSD FRML MDRD: 17 ML/MIN/1.73
GLUCOSE BLD-MCNC: 180 MG/DL (ref 65–99)
GLUCOSE BLD-MCNC: 458 MG/DL (ref 65–99)
GLUCOSE BLDC GLUCOMTR-MCNC: 185 MG/DL (ref 70–130)
GLUCOSE BLDC GLUCOMTR-MCNC: 257 MG/DL (ref 70–130)
GLUCOSE BLDC GLUCOMTR-MCNC: 291 MG/DL (ref 70–130)
GLUCOSE BLDC GLUCOMTR-MCNC: 380 MG/DL (ref 70–130)
GLUCOSE BLDC GLUCOMTR-MCNC: 435 MG/DL (ref 70–130)
GLUCOSE BLDC GLUCOMTR-MCNC: 441 MG/DL (ref 70–130)
GRAM STN SPEC: ABNORMAL
GRAM STN SPEC: ABNORMAL
HCT VFR BLD AUTO: 29.1 % (ref 42–52)
HGB BLD-MCNC: 9.2 G/DL (ref 14–18)
IMM GRANULOCYTES # BLD: 0.69 10*3/MM3 (ref 0–0.03)
IMM GRANULOCYTES NFR BLD: 4.9 % (ref 0–0.5)
LYMPHOCYTES # BLD AUTO: 0.43 10*3/MM3 (ref 0.6–4.8)
LYMPHOCYTES NFR BLD AUTO: 3 % (ref 20–45)
MCH RBC QN AUTO: 28 PG (ref 27–31)
MCHC RBC AUTO-ENTMCNC: 31.6 G/DL (ref 31–37)
MCV RBC AUTO: 88.7 FL (ref 80–94)
MONOCYTES # BLD AUTO: 0.18 10*3/MM3 (ref 0–1)
MONOCYTES NFR BLD AUTO: 1.3 % (ref 3–8)
NEUTROPHILS # BLD AUTO: 12.78 10*3/MM3 (ref 1.5–8.3)
NEUTROPHILS NFR BLD AUTO: 90.4 % (ref 45–70)
NRBC BLD MANUAL-RTO: 0 /100 WBC (ref 0–0)
PHOSPHATE SERPL-MCNC: 3.4 MG/DL (ref 2.7–4.5)
PLATELET # BLD AUTO: 272 10*3/MM3 (ref 140–500)
PMV BLD AUTO: 9.8 FL (ref 7.4–10.4)
POTASSIUM BLD-SCNC: 3.6 MMOL/L (ref 3.5–5.2)
PROCALCITONIN SERPL-MCNC: 0.46 NG/ML (ref 0.1–0.25)
RBC # BLD AUTO: 3.28 10*6/MM3 (ref 4.7–6.1)
SODIUM BLD-SCNC: 140 MMOL/L (ref 136–145)
WBC NRBC COR # BLD: 14.14 10*3/MM3 (ref 4.8–10.8)

## 2018-01-21 PROCEDURE — 82947 ASSAY GLUCOSE BLOOD QUANT: CPT | Performed by: HOSPITALIST

## 2018-01-21 PROCEDURE — 84145 PROCALCITONIN (PCT): CPT | Performed by: HOSPITALIST

## 2018-01-21 PROCEDURE — 87186 SC STD MICRODIL/AGAR DIL: CPT | Performed by: SURGERY

## 2018-01-21 PROCEDURE — 63710000001 INSULIN DETEMIR PER 5 UNITS: Performed by: HOSPITALIST

## 2018-01-21 PROCEDURE — 11042 DBRDMT SUBQ TIS 1ST 20SQCM/<: CPT | Performed by: SURGERY

## 2018-01-21 PROCEDURE — 25010000002 PIPERACILLIN-TAZOBACTAM: Performed by: INTERNAL MEDICINE

## 2018-01-21 PROCEDURE — 25010000002 FENTANYL CITRATE (PF) 100 MCG/2ML SOLUTION: Performed by: NURSE ANESTHETIST, CERTIFIED REGISTERED

## 2018-01-21 PROCEDURE — 11043 DBRDMT MUSC&/FSCA 1ST 20/<: CPT | Performed by: SURGERY

## 2018-01-21 PROCEDURE — 80069 RENAL FUNCTION PANEL: CPT | Performed by: INTERNAL MEDICINE

## 2018-01-21 PROCEDURE — 0JBN0ZZ EXCISION OF RIGHT LOWER LEG SUBCUTANEOUS TISSUE AND FASCIA, OPEN APPROACH: ICD-10-PCS | Performed by: SURGERY

## 2018-01-21 PROCEDURE — 25010000002 MIDAZOLAM PER 1 MG: Performed by: NURSE ANESTHETIST, CERTIFIED REGISTERED

## 2018-01-21 PROCEDURE — 99232 SBSQ HOSP IP/OBS MODERATE 35: CPT | Performed by: HOSPITALIST

## 2018-01-21 PROCEDURE — 25010000002 ONDANSETRON PER 1 MG: Performed by: NURSE ANESTHETIST, CERTIFIED REGISTERED

## 2018-01-21 PROCEDURE — 87075 CULTR BACTERIA EXCEPT BLOOD: CPT | Performed by: SURGERY

## 2018-01-21 PROCEDURE — 87205 SMEAR GRAM STAIN: CPT | Performed by: SURGERY

## 2018-01-21 PROCEDURE — 87070 CULTURE OTHR SPECIMN AEROBIC: CPT | Performed by: SURGERY

## 2018-01-21 PROCEDURE — 25010000002 VANCOMYCIN PER 500 MG: Performed by: INTERNAL MEDICINE

## 2018-01-21 PROCEDURE — 82962 GLUCOSE BLOOD TEST: CPT

## 2018-01-21 PROCEDURE — 0JBR0ZZ EXCISION OF LEFT FOOT SUBCUTANEOUS TISSUE AND FASCIA, OPEN APPROACH: ICD-10-PCS | Performed by: SURGERY

## 2018-01-21 PROCEDURE — 25010000002 HYDROMORPHONE PER 4 MG: Performed by: NURSE ANESTHETIST, CERTIFIED REGISTERED

## 2018-01-21 PROCEDURE — 25010000002 HYDRALAZINE PER 20 MG: Performed by: NURSE PRACTITIONER

## 2018-01-21 PROCEDURE — 85025 COMPLETE CBC W/AUTO DIFF WBC: CPT | Performed by: HOSPITALIST

## 2018-01-21 PROCEDURE — 25010000002 DEXAMETHASONE PER 1 MG: Performed by: NURSE ANESTHETIST, CERTIFIED REGISTERED

## 2018-01-21 PROCEDURE — 25010000002 PROPOFOL 10 MG/ML EMULSION: Performed by: NURSE ANESTHETIST, CERTIFIED REGISTERED

## 2018-01-21 PROCEDURE — 63710000001 INSULIN ASPART PER 5 UNITS: Performed by: HOSPITALIST

## 2018-01-21 PROCEDURE — 25010000002 DIPHENHYDRAMINE PER 50 MG: Performed by: NURSE ANESTHETIST, CERTIFIED REGISTERED

## 2018-01-21 PROCEDURE — 87147 CULTURE TYPE IMMUNOLOGIC: CPT | Performed by: SURGERY

## 2018-01-21 RX ORDER — FENTANYL CITRATE 50 UG/ML
INJECTION, SOLUTION INTRAMUSCULAR; INTRAVENOUS AS NEEDED
Status: DISCONTINUED | OUTPATIENT
Start: 2018-01-21 | End: 2018-01-21 | Stop reason: SURG

## 2018-01-21 RX ORDER — DIPHENHYDRAMINE HYDROCHLORIDE 50 MG/ML
12.5 INJECTION INTRAMUSCULAR; INTRAVENOUS
Status: DISCONTINUED | OUTPATIENT
Start: 2018-01-21 | End: 2018-01-21 | Stop reason: HOSPADM

## 2018-01-21 RX ORDER — MIDAZOLAM HYDROCHLORIDE 1 MG/ML
2 INJECTION INTRAMUSCULAR; INTRAVENOUS
Status: DISCONTINUED | OUTPATIENT
Start: 2018-01-21 | End: 2018-01-21 | Stop reason: HOSPADM

## 2018-01-21 RX ORDER — MIDAZOLAM HYDROCHLORIDE 1 MG/ML
1 INJECTION INTRAMUSCULAR; INTRAVENOUS
Status: DISCONTINUED | OUTPATIENT
Start: 2018-01-21 | End: 2018-01-21 | Stop reason: HOSPADM

## 2018-01-21 RX ORDER — MEPERIDINE HYDROCHLORIDE 25 MG/ML
12.5 INJECTION INTRAMUSCULAR; INTRAVENOUS; SUBCUTANEOUS
Status: DISCONTINUED | OUTPATIENT
Start: 2018-01-21 | End: 2018-01-21 | Stop reason: HOSPADM

## 2018-01-21 RX ORDER — HYDROMORPHONE HCL 110MG/55ML
0.5 PATIENT CONTROLLED ANALGESIA SYRINGE INTRAVENOUS
Status: DISCONTINUED | OUTPATIENT
Start: 2018-01-21 | End: 2018-01-21 | Stop reason: HOSPADM

## 2018-01-21 RX ORDER — ONDANSETRON 2 MG/ML
4 INJECTION INTRAMUSCULAR; INTRAVENOUS ONCE AS NEEDED
Status: COMPLETED | OUTPATIENT
Start: 2018-01-21 | End: 2018-01-21

## 2018-01-21 RX ORDER — ONDANSETRON 2 MG/ML
4 INJECTION INTRAMUSCULAR; INTRAVENOUS ONCE AS NEEDED
Status: DISCONTINUED | OUTPATIENT
Start: 2018-01-21 | End: 2018-01-21 | Stop reason: HOSPADM

## 2018-01-21 RX ORDER — ACETAMINOPHEN 650 MG/1
650 SUPPOSITORY RECTAL ONCE AS NEEDED
Status: DISCONTINUED | OUTPATIENT
Start: 2018-01-21 | End: 2018-01-21 | Stop reason: HOSPADM

## 2018-01-21 RX ORDER — PROPOFOL 10 MG/ML
VIAL (ML) INTRAVENOUS AS NEEDED
Status: DISCONTINUED | OUTPATIENT
Start: 2018-01-21 | End: 2018-01-21 | Stop reason: SURG

## 2018-01-21 RX ORDER — FAMOTIDINE 10 MG/ML
20 INJECTION, SOLUTION INTRAVENOUS
Status: DISCONTINUED | OUTPATIENT
Start: 2018-01-21 | End: 2018-01-21 | Stop reason: HOSPADM

## 2018-01-21 RX ORDER — LIDOCAINE HYDROCHLORIDE 20 MG/ML
INJECTION, SOLUTION INFILTRATION; PERINEURAL AS NEEDED
Status: DISCONTINUED | OUTPATIENT
Start: 2018-01-21 | End: 2018-01-21 | Stop reason: SURG

## 2018-01-21 RX ORDER — SODIUM CHLORIDE 0.9 % (FLUSH) 0.9 %
1-10 SYRINGE (ML) INJECTION AS NEEDED
Status: DISCONTINUED | OUTPATIENT
Start: 2018-01-21 | End: 2018-01-21 | Stop reason: HOSPADM

## 2018-01-21 RX ORDER — DIPHENHYDRAMINE HYDROCHLORIDE 50 MG/ML
12.5 INJECTION INTRAMUSCULAR; INTRAVENOUS ONCE
Status: COMPLETED | OUTPATIENT
Start: 2018-01-21 | End: 2018-01-21

## 2018-01-21 RX ORDER — SODIUM CHLORIDE 9 MG/ML
40 INJECTION, SOLUTION INTRAVENOUS AS NEEDED
Status: DISCONTINUED | OUTPATIENT
Start: 2018-01-21 | End: 2018-01-21 | Stop reason: HOSPADM

## 2018-01-21 RX ORDER — MAGNESIUM HYDROXIDE 1200 MG/15ML
LIQUID ORAL AS NEEDED
Status: DISCONTINUED | OUTPATIENT
Start: 2018-01-21 | End: 2018-01-21 | Stop reason: HOSPADM

## 2018-01-21 RX ORDER — ACETAMINOPHEN 325 MG/1
650 TABLET ORAL ONCE AS NEEDED
Status: DISCONTINUED | OUTPATIENT
Start: 2018-01-21 | End: 2018-01-21 | Stop reason: HOSPADM

## 2018-01-21 RX ORDER — OXYCODONE HYDROCHLORIDE AND ACETAMINOPHEN 5; 325 MG/1; MG/1
1 TABLET ORAL ONCE AS NEEDED
Status: DISCONTINUED | OUTPATIENT
Start: 2018-01-21 | End: 2018-01-21 | Stop reason: HOSPADM

## 2018-01-21 RX ORDER — DEXAMETHASONE SODIUM PHOSPHATE 4 MG/ML
8 INJECTION, SOLUTION INTRA-ARTICULAR; INTRALESIONAL; INTRAMUSCULAR; INTRAVENOUS; SOFT TISSUE ONCE AS NEEDED
Status: COMPLETED | OUTPATIENT
Start: 2018-01-21 | End: 2018-01-21

## 2018-01-21 RX ORDER — SODIUM CHLORIDE, SODIUM LACTATE, POTASSIUM CHLORIDE, CALCIUM CHLORIDE 600; 310; 30; 20 MG/100ML; MG/100ML; MG/100ML; MG/100ML
9 INJECTION, SOLUTION INTRAVENOUS CONTINUOUS PRN
Status: DISCONTINUED | OUTPATIENT
Start: 2018-01-21 | End: 2018-01-21 | Stop reason: HOSPADM

## 2018-01-21 RX ADMIN — HYDROCODONE BITARTRATE AND ACETAMINOPHEN 1 TABLET: 5; 325 TABLET ORAL at 16:22

## 2018-01-21 RX ADMIN — INSULIN ASPART 6 UNITS: 100 INJECTION, SOLUTION INTRAVENOUS; SUBCUTANEOUS at 12:07

## 2018-01-21 RX ADMIN — FENTANYL CITRATE 25 MCG: 50 INJECTION, SOLUTION INTRAMUSCULAR; INTRAVENOUS at 08:50

## 2018-01-21 RX ADMIN — INSULIN ASPART 6 UNITS: 100 INJECTION, SOLUTION INTRAVENOUS; SUBCUTANEOUS at 16:56

## 2018-01-21 RX ADMIN — SODIUM BICARBONATE 650 MG TABLET 650 MG: at 18:25

## 2018-01-21 RX ADMIN — TRAZODONE HYDROCHLORIDE 100 MG: 50 TABLET ORAL at 21:24

## 2018-01-21 RX ADMIN — FENTANYL CITRATE 25 MCG: 50 INJECTION, SOLUTION INTRAMUSCULAR; INTRAVENOUS at 08:15

## 2018-01-21 RX ADMIN — INSULIN DETEMIR 15 UNITS: 100 INJECTION, SOLUTION SUBCUTANEOUS at 21:25

## 2018-01-21 RX ADMIN — GABAPENTIN 300 MG: 300 CAPSULE ORAL at 21:22

## 2018-01-21 RX ADMIN — CLONIDINE HYDROCHLORIDE 0.1 MG: 0.1 TABLET ORAL at 21:23

## 2018-01-21 RX ADMIN — MIDAZOLAM HYDROCHLORIDE 1 MG: 1 INJECTION, SOLUTION INTRAMUSCULAR; INTRAVENOUS at 07:54

## 2018-01-21 RX ADMIN — Medication 10 ML: at 14:07

## 2018-01-21 RX ADMIN — HYDRALAZINE HYDROCHLORIDE 50 MG: 50 TABLET ORAL at 21:24

## 2018-01-21 RX ADMIN — HYDROCODONE BITARTRATE AND ACETAMINOPHEN 1 TABLET: 5; 325 TABLET ORAL at 22:27

## 2018-01-21 RX ADMIN — PIPERACILLIN SODIUM,TAZOBACTAM SODIUM 3.38 G: 3; .375 INJECTION, POWDER, FOR SOLUTION INTRAVENOUS at 06:02

## 2018-01-21 RX ADMIN — INSULIN ASPART 9 UNITS: 100 INJECTION, SOLUTION INTRAVENOUS; SUBCUTANEOUS at 21:25

## 2018-01-21 RX ADMIN — HYDRALAZINE HYDROCHLORIDE 20 MG: 20 INJECTION INTRAMUSCULAR; INTRAVENOUS at 06:02

## 2018-01-21 RX ADMIN — FENTANYL CITRATE 50 MCG: 50 INJECTION, SOLUTION INTRAMUSCULAR; INTRAVENOUS at 08:09

## 2018-01-21 RX ADMIN — Medication 10 ML: at 14:06

## 2018-01-21 RX ADMIN — SODIUM BICARBONATE 650 MG TABLET 650 MG: at 12:07

## 2018-01-21 RX ADMIN — PROPOFOL 150 MG: 10 INJECTION, EMULSION INTRAVENOUS at 08:10

## 2018-01-21 RX ADMIN — HYDROCODONE BITARTRATE AND ACETAMINOPHEN 1 TABLET: 5; 325 TABLET ORAL at 10:26

## 2018-01-21 RX ADMIN — SODIUM CHLORIDE, POTASSIUM CHLORIDE, SODIUM LACTATE AND CALCIUM CHLORIDE: 600; 310; 30; 20 INJECTION, SOLUTION INTRAVENOUS at 08:07

## 2018-01-21 RX ADMIN — HYDRALAZINE HYDROCHLORIDE 50 MG: 50 TABLET ORAL at 13:53

## 2018-01-21 RX ADMIN — METOPROLOL TARTRATE 50 MG: 50 TABLET, FILM COATED ORAL at 21:23

## 2018-01-21 RX ADMIN — LIDOCAINE HYDROCHLORIDE 50 MG: 20 INJECTION, SOLUTION INFILTRATION; PERINEURAL at 08:09

## 2018-01-21 RX ADMIN — VANCOMYCIN HYDROCHLORIDE 750 MG: 1 INJECTION, POWDER, LYOPHILIZED, FOR SOLUTION INTRAVENOUS at 14:02

## 2018-01-21 RX ADMIN — INSULIN ASPART 8 UNITS: 100 INJECTION, SOLUTION INTRAVENOUS; SUBCUTANEOUS at 23:37

## 2018-01-21 RX ADMIN — GABAPENTIN 300 MG: 300 CAPSULE ORAL at 10:22

## 2018-01-21 RX ADMIN — ONDANSETRON 4 MG: 2 INJECTION, SOLUTION INTRAMUSCULAR; INTRAVENOUS at 07:53

## 2018-01-21 RX ADMIN — METOPROLOL TARTRATE 50 MG: 50 TABLET, FILM COATED ORAL at 10:23

## 2018-01-21 RX ADMIN — PANTOPRAZOLE SODIUM 40 MG: 40 TABLET, DELAYED RELEASE ORAL at 18:25

## 2018-01-21 RX ADMIN — DIPHENHYDRAMINE HYDROCHLORIDE 12.5 MG: 50 INJECTION, SOLUTION INTRAMUSCULAR; INTRAVENOUS at 07:53

## 2018-01-21 RX ADMIN — AMLODIPINE BESYLATE 10 MG: 5 TABLET ORAL at 10:23

## 2018-01-21 RX ADMIN — OLANZAPINE 5 MG: 5 TABLET, FILM COATED ORAL at 21:24

## 2018-01-21 RX ADMIN — DEXAMETHASONE SODIUM PHOSPHATE 8 MG: 4 INJECTION, SOLUTION INTRAMUSCULAR; INTRAVENOUS at 07:52

## 2018-01-21 RX ADMIN — TAMSULOSIN HYDROCHLORIDE 0.4 MG: 0.4 CAPSULE ORAL at 21:24

## 2018-01-21 RX ADMIN — HYDROMORPHONE HYDROCHLORIDE 0.5 MG: 2 INJECTION INTRAMUSCULAR; INTRAVENOUS; SUBCUTANEOUS at 09:24

## 2018-01-21 RX ADMIN — SODIUM BICARBONATE 650 MG TABLET 650 MG: at 21:22

## 2018-01-21 RX ADMIN — CLONIDINE HYDROCHLORIDE 0.1 MG: 0.1 TABLET ORAL at 10:23

## 2018-01-21 NOTE — ANESTHESIA PREPROCEDURE EVALUATION
Anesthesia Evaluation     Patient summary reviewed and Nursing notes reviewed   no history of anesthetic complications:  NPO Solid Status: > 8 hours  NPO Liquid Status: > 8 hours     Airway   Mallampati: II  TM distance: >3 FB  Neck ROM: full  possible difficult intubation and no difficulty expected  Dental    (+) poor dentition        Pulmonary - normal exam   (+) a smoker (quit 12 years ago) Former, COPD mild, recent URI resolved, sleep apnea on CPAP,   Cardiovascular     ECG reviewed  Rhythm: irregular  Rate: normal    (+) hypertension poorly controlled,     PE comment: PACs on monitor      Neuro/Psych  GI/Hepatic/Renal/Endo    (+) obesity,  renal disease CRI, diabetes mellitus type 1,     Musculoskeletal     Abdominal   (+) obese,    Substance History   (+) alcohol use (Occ),      OB/GYN          Other   (+) arthritis                                           Anesthesia Plan    ASA 3 - emergent     general     intravenous induction   Anesthetic plan and risks discussed with patient.  Use of blood products discussed with patient  Consented to blood products.

## 2018-01-21 NOTE — ANESTHESIA POSTPROCEDURE EVALUATION
Patient: Kevin Mckeon V    Procedure Summary     Date Anesthesia Start Anesthesia Stop Room / Location    01/21/18 0807 0902 [facility] 2 / [facility]       Procedure Diagnosis Surgeon Provider    LOWER EXTREMITY DEBRIDEMENT 8:00 (Bilateral ) Ulcer of right knee, unspecified ulcer stage  (Ulcer of right knee, unspecified ulcer stage [L97.819]) DO Lorenzo Larkin, NIKKO          Anesthesia Type: general  Last vitals  BP   159/92 (01/21/18 0722)   Temp   98.2 °F (36.8 °C) (01/21/18 0604)   Pulse   76 (01/21/18 0722)   Resp   18 (01/21/18 0722)     SpO2   92 % (01/21/18 0722)     Post Anesthesia Care and Evaluation    Patient location during evaluation: bedside  Patient participation: complete - patient participated  Level of consciousness: awake and alert  Pain score: 0  Pain management: adequate  Airway patency: patent  Anesthetic complications: No anesthetic complications    Cardiovascular status: acceptable  Respiratory status: acceptable  Hydration status: acceptable

## 2018-01-21 NOTE — ANESTHESIA PROCEDURE NOTES
Airway  Urgency: elective    Airway not difficult    General Information and Staff    Patient location during procedure: OR  CRNA: PATTI OLIVO    Indications and Patient Condition  Indications for airway management: airway protection    Preoxygenated: yes  MILS maintained throughout  Mask difficulty assessment: 1 - vent by mask    Final Airway Details  Final airway type: supraglottic airway      Successful airway: classic  Size 4    Number of attempts at approach: 1    Additional Comments  To OR, monitors and O2 on. Smooth IV ind. - LMA inserted  x 1 attempt + BBS. Tape OU. Pressure points checked and padded

## 2018-01-22 LAB
ALBUMIN SERPL-MCNC: 1.9 G/DL (ref 3.5–5.2)
ANION GAP SERPL CALCULATED.3IONS-SCNC: 10.8 MMOL/L
BASOPHILS # BLD AUTO: 0.06 10*3/MM3 (ref 0–0.2)
BASOPHILS NFR BLD AUTO: 0.4 % (ref 0–2)
BUN BLD-MCNC: 38 MG/DL (ref 8–23)
BUN/CREAT SERPL: 9.4 (ref 7–25)
CALCIUM SPEC-SCNC: 7.7 MG/DL (ref 8.8–10.5)
CHLORIDE SERPL-SCNC: 107 MMOL/L (ref 98–107)
CO2 SERPL-SCNC: 22.2 MMOL/L (ref 22–29)
CREAT BLD-MCNC: 4.04 MG/DL (ref 0.76–1.27)
DEPRECATED RDW RBC AUTO: 44 FL (ref 37–54)
EOSINOPHIL # BLD AUTO: 0 10*3/MM3 (ref 0.1–0.3)
EOSINOPHIL NFR BLD AUTO: 0 % (ref 0–4)
ERYTHROCYTE [DISTWIDTH] IN BLOOD BY AUTOMATED COUNT: 13.4 % (ref 11.5–14.5)
GFR SERPL CREATININE-BSD FRML MDRD: 15 ML/MIN/1.73
GLUCOSE BLD-MCNC: 171 MG/DL (ref 65–99)
GLUCOSE BLDC GLUCOMTR-MCNC: 180 MG/DL (ref 70–130)
GLUCOSE BLDC GLUCOMTR-MCNC: 198 MG/DL (ref 70–130)
GLUCOSE BLDC GLUCOMTR-MCNC: 199 MG/DL (ref 70–130)
GLUCOSE BLDC GLUCOMTR-MCNC: 294 MG/DL (ref 70–130)
GLUCOSE BLDC GLUCOMTR-MCNC: 299 MG/DL (ref 70–130)
HCT VFR BLD AUTO: 25.7 % (ref 42–52)
HCT VFR BLD AUTO: 28.6 % (ref 42–52)
HGB BLD-MCNC: 8.2 G/DL (ref 14–18)
HGB BLD-MCNC: 9.3 G/DL (ref 14–18)
IMM GRANULOCYTES # BLD: 0.7 10*3/MM3 (ref 0–0.03)
IMM GRANULOCYTES NFR BLD: 4.8 % (ref 0–0.5)
LYMPHOCYTES # BLD AUTO: 1.86 10*3/MM3 (ref 0.6–4.8)
LYMPHOCYTES NFR BLD AUTO: 12.8 % (ref 20–45)
MCH RBC QN AUTO: 28.5 PG (ref 27–31)
MCHC RBC AUTO-ENTMCNC: 31.9 G/DL (ref 31–37)
MCV RBC AUTO: 89.2 FL (ref 80–94)
MONOCYTES # BLD AUTO: 0.88 10*3/MM3 (ref 0–1)
MONOCYTES NFR BLD AUTO: 6.1 % (ref 3–8)
NEUTROPHILS # BLD AUTO: 11.03 10*3/MM3 (ref 1.5–8.3)
NEUTROPHILS NFR BLD AUTO: 75.9 % (ref 45–70)
NRBC BLD MANUAL-RTO: 0 /100 WBC (ref 0–0)
PHOSPHATE SERPL-MCNC: 4 MG/DL (ref 2.7–4.5)
PLATELET # BLD AUTO: 276 10*3/MM3 (ref 140–500)
PMV BLD AUTO: 10.2 FL (ref 7.4–10.4)
POTASSIUM BLD-SCNC: 3.7 MMOL/L (ref 3.5–5.2)
RBC # BLD AUTO: 2.88 10*6/MM3 (ref 4.7–6.1)
SODIUM BLD-SCNC: 140 MMOL/L (ref 136–145)
WBC NRBC COR # BLD: 14.53 10*3/MM3 (ref 4.8–10.8)

## 2018-01-22 PROCEDURE — 82962 GLUCOSE BLOOD TEST: CPT

## 2018-01-22 PROCEDURE — 63510000001 EPOETIN ALFA PER 1000 UNITS: Performed by: INTERNAL MEDICINE

## 2018-01-22 PROCEDURE — 80069 RENAL FUNCTION PANEL: CPT | Performed by: INTERNAL MEDICINE

## 2018-01-22 PROCEDURE — 85018 HEMOGLOBIN: CPT | Performed by: SURGERY

## 2018-01-22 PROCEDURE — 25010000002 HYDRALAZINE PER 20 MG: Performed by: NURSE PRACTITIONER

## 2018-01-22 PROCEDURE — 99232 SBSQ HOSP IP/OBS MODERATE 35: CPT | Performed by: HOSPITALIST

## 2018-01-22 PROCEDURE — 97164 PT RE-EVAL EST PLAN CARE: CPT

## 2018-01-22 PROCEDURE — 99232 SBSQ HOSP IP/OBS MODERATE 35: CPT | Performed by: SURGERY

## 2018-01-22 PROCEDURE — 85014 HEMATOCRIT: CPT | Performed by: SURGERY

## 2018-01-22 PROCEDURE — 25010000002 HYDROMORPHONE PER 4 MG

## 2018-01-22 PROCEDURE — 63710000001 INSULIN DETEMIR PER 5 UNITS: Performed by: HOSPITALIST

## 2018-01-22 PROCEDURE — 63710000001 INSULIN ASPART PER 5 UNITS: Performed by: HOSPITALIST

## 2018-01-22 PROCEDURE — 85025 COMPLETE CBC W/AUTO DIFF WBC: CPT | Performed by: HOSPITALIST

## 2018-01-22 RX ORDER — HYDROMORPHONE HCL 110MG/55ML
PATIENT CONTROLLED ANALGESIA SYRINGE INTRAVENOUS
Status: COMPLETED
Start: 2018-01-22 | End: 2018-01-22

## 2018-01-22 RX ORDER — HYDROMORPHONE HCL 110MG/55ML
1 PATIENT CONTROLLED ANALGESIA SYRINGE INTRAVENOUS ONCE
Status: COMPLETED | OUTPATIENT
Start: 2018-01-22 | End: 2018-01-22

## 2018-01-22 RX ORDER — MINOCYCLINE HYDROCHLORIDE 50 MG/1
100 CAPSULE ORAL EVERY 12 HOURS SCHEDULED
Status: DISCONTINUED | OUTPATIENT
Start: 2018-01-22 | End: 2018-01-25 | Stop reason: HOSPADM

## 2018-01-22 RX ORDER — SODIUM BICARBONATE 650 MG/1
650 TABLET ORAL 3 TIMES DAILY
Status: DISCONTINUED | OUTPATIENT
Start: 2018-01-22 | End: 2018-01-25 | Stop reason: HOSPADM

## 2018-01-22 RX ORDER — SODIUM CHLORIDE 9 MG/ML
100 INJECTION, SOLUTION INTRAVENOUS CONTINUOUS
Status: ACTIVE | OUTPATIENT
Start: 2018-01-22 | End: 2018-01-23

## 2018-01-22 RX ORDER — LINEZOLID 2 MG/ML
600 INJECTION, SOLUTION INTRAVENOUS EVERY 12 HOURS
Status: DISCONTINUED | OUTPATIENT
Start: 2018-01-22 | End: 2018-01-22

## 2018-01-22 RX ORDER — MAGNESIUM HYDROXIDE 1200 MG/15ML
LIQUID ORAL
Status: DISPENSED
Start: 2018-01-22 | End: 2018-01-22

## 2018-01-22 RX ADMIN — Medication 1 MG: at 00:51

## 2018-01-22 RX ADMIN — INSULIN DETEMIR 15 UNITS: 100 INJECTION, SOLUTION SUBCUTANEOUS at 21:23

## 2018-01-22 RX ADMIN — INSULIN ASPART 2 UNITS: 100 INJECTION, SOLUTION INTRAVENOUS; SUBCUTANEOUS at 21:23

## 2018-01-22 RX ADMIN — INSULIN ASPART 2 UNITS: 100 INJECTION, SOLUTION INTRAVENOUS; SUBCUTANEOUS at 08:32

## 2018-01-22 RX ADMIN — OLANZAPINE 5 MG: 5 TABLET, FILM COATED ORAL at 21:22

## 2018-01-22 RX ADMIN — ERYTHROPOIETIN 20000 UNITS: 20000 INJECTION, SOLUTION INTRAVENOUS; SUBCUTANEOUS at 14:11

## 2018-01-22 RX ADMIN — BUPROPION HYDROCHLORIDE 200 MG: 100 TABLET, FILM COATED, EXTENDED RELEASE ORAL at 08:33

## 2018-01-22 RX ADMIN — HYDRALAZINE HYDROCHLORIDE 50 MG: 50 TABLET ORAL at 06:19

## 2018-01-22 RX ADMIN — SODIUM CHLORIDE 100 ML/HR: 9 INJECTION, SOLUTION INTRAVENOUS at 10:46

## 2018-01-22 RX ADMIN — HYDRALAZINE HYDROCHLORIDE 20 MG: 20 INJECTION INTRAMUSCULAR; INTRAVENOUS at 00:36

## 2018-01-22 RX ADMIN — INSULIN ASPART 6 UNITS: 100 INJECTION, SOLUTION INTRAVENOUS; SUBCUTANEOUS at 11:57

## 2018-01-22 RX ADMIN — CITALOPRAM HYDROBROMIDE 20 MG: 20 TABLET ORAL at 08:33

## 2018-01-22 RX ADMIN — SODIUM CHLORIDE 100 ML/HR: 9 INJECTION, SOLUTION INTRAVENOUS at 21:39

## 2018-01-22 RX ADMIN — SODIUM BICARBONATE 650 MG TABLET 650 MG: at 08:33

## 2018-01-22 RX ADMIN — HYDROCODONE BITARTRATE AND ACETAMINOPHEN 1 TABLET: 5; 325 TABLET ORAL at 08:32

## 2018-01-22 RX ADMIN — METOPROLOL TARTRATE 50 MG: 50 TABLET, FILM COATED ORAL at 21:22

## 2018-01-22 RX ADMIN — INSULIN ASPART 2 UNITS: 100 INJECTION, SOLUTION INTRAVENOUS; SUBCUTANEOUS at 17:24

## 2018-01-22 RX ADMIN — CLONIDINE HYDROCHLORIDE 0.1 MG: 0.1 TABLET ORAL at 21:22

## 2018-01-22 RX ADMIN — HYDRALAZINE HYDROCHLORIDE 50 MG: 50 TABLET ORAL at 14:11

## 2018-01-22 RX ADMIN — SODIUM BICARBONATE 650 MG TABLET 650 MG: at 17:24

## 2018-01-22 RX ADMIN — SODIUM BICARBONATE 650 MG TABLET 650 MG: at 21:22

## 2018-01-22 RX ADMIN — AMLODIPINE BESYLATE 10 MG: 5 TABLET ORAL at 08:34

## 2018-01-22 RX ADMIN — TRAZODONE HYDROCHLORIDE 100 MG: 50 TABLET ORAL at 21:21

## 2018-01-22 RX ADMIN — PANTOPRAZOLE SODIUM 40 MG: 40 TABLET, DELAYED RELEASE ORAL at 17:24

## 2018-01-22 RX ADMIN — MINOCYCLINE HYDROCHLORIDE 100 MG: 50 CAPSULE ORAL at 21:30

## 2018-01-22 RX ADMIN — GABAPENTIN 300 MG: 300 CAPSULE ORAL at 08:38

## 2018-01-22 RX ADMIN — METOPROLOL TARTRATE 50 MG: 50 TABLET, FILM COATED ORAL at 08:34

## 2018-01-22 RX ADMIN — CLONIDINE HYDROCHLORIDE 0.1 MG: 0.1 TABLET ORAL at 08:33

## 2018-01-22 RX ADMIN — HYDROMORPHONE HYDROCHLORIDE 1 MG: 2 INJECTION INTRAMUSCULAR; INTRAVENOUS; SUBCUTANEOUS at 00:51

## 2018-01-22 RX ADMIN — PANTOPRAZOLE SODIUM 40 MG: 40 TABLET, DELAYED RELEASE ORAL at 06:30

## 2018-01-22 RX ADMIN — INSULIN ASPART 6 UNITS: 100 INJECTION, SOLUTION INTRAVENOUS; SUBCUTANEOUS at 01:41

## 2018-01-22 RX ADMIN — TAMSULOSIN HYDROCHLORIDE 0.4 MG: 0.4 CAPSULE ORAL at 21:22

## 2018-01-22 RX ADMIN — GABAPENTIN 300 MG: 300 CAPSULE ORAL at 21:22

## 2018-01-22 RX ADMIN — HYDRALAZINE HYDROCHLORIDE 50 MG: 50 TABLET ORAL at 21:20

## 2018-01-23 LAB
ALBUMIN SERPL-MCNC: 2.2 G/DL (ref 3.5–5.2)
ANION GAP SERPL CALCULATED.3IONS-SCNC: 11.2 MMOL/L
BACTERIA SPEC AEROBE CULT: ABNORMAL
BACTERIA SPEC AEROBE CULT: ABNORMAL
BUN BLD-MCNC: 41 MG/DL (ref 8–23)
BUN/CREAT SERPL: 10.6 (ref 7–25)
CALCIUM SPEC-SCNC: 7.8 MG/DL (ref 8.8–10.5)
CHLORIDE SERPL-SCNC: 104 MMOL/L (ref 98–107)
CO2 SERPL-SCNC: 20.8 MMOL/L (ref 22–29)
CREAT BLD-MCNC: 3.86 MG/DL (ref 0.76–1.27)
FERRITIN SERPL-MCNC: 141 NG/ML (ref 30–400)
FOLATE SERPL-MCNC: 10.76 NG/ML (ref 4.78–20)
GFR SERPL CREATININE-BSD FRML MDRD: 16 ML/MIN/1.73
GLUCOSE BLD-MCNC: 290 MG/DL (ref 65–99)
GLUCOSE BLDC GLUCOMTR-MCNC: 243 MG/DL (ref 70–130)
GLUCOSE BLDC GLUCOMTR-MCNC: 271 MG/DL (ref 70–130)
GLUCOSE BLDC GLUCOMTR-MCNC: 275 MG/DL (ref 70–130)
GLUCOSE BLDC GLUCOMTR-MCNC: 352 MG/DL (ref 70–130)
GRAM STN SPEC: ABNORMAL
GRAM STN SPEC: ABNORMAL
IRON 24H UR-MRATE: 23 MCG/DL (ref 59–158)
IRON SATN MFR SERPL: 18 %
PHOSPHATE SERPL-MCNC: 3.4 MG/DL (ref 2.7–4.5)
POTASSIUM BLD-SCNC: 3.7 MMOL/L (ref 3.5–5.2)
SODIUM BLD-SCNC: 136 MMOL/L (ref 136–145)
TIBC SERPL-MCNC: 128 MCG/DL (ref 261–498)
UIBC SERPL-MCNC: 105 MCG/DL (ref 112–346)
VIT B12 BLD-MCNC: 587 PG/ML (ref 232–1245)

## 2018-01-23 PROCEDURE — 82746 ASSAY OF FOLIC ACID SERUM: CPT | Performed by: INTERNAL MEDICINE

## 2018-01-23 PROCEDURE — G0515 COGNITIVE SKILLS DEVELOPMENT: HCPCS

## 2018-01-23 PROCEDURE — 97110 THERAPEUTIC EXERCISES: CPT

## 2018-01-23 PROCEDURE — 82728 ASSAY OF FERRITIN: CPT | Performed by: INTERNAL MEDICINE

## 2018-01-23 PROCEDURE — 99232 SBSQ HOSP IP/OBS MODERATE 35: CPT | Performed by: HOSPITALIST

## 2018-01-23 PROCEDURE — 63710000001 INSULIN DETEMIR PER 5 UNITS: Performed by: HOSPITALIST

## 2018-01-23 PROCEDURE — 83550 IRON BINDING TEST: CPT | Performed by: INTERNAL MEDICINE

## 2018-01-23 PROCEDURE — 63710000001 INSULIN ASPART PER 5 UNITS: Performed by: HOSPITALIST

## 2018-01-23 PROCEDURE — 99231 SBSQ HOSP IP/OBS SF/LOW 25: CPT | Performed by: SURGERY

## 2018-01-23 PROCEDURE — 82962 GLUCOSE BLOOD TEST: CPT

## 2018-01-23 PROCEDURE — 83540 ASSAY OF IRON: CPT | Performed by: INTERNAL MEDICINE

## 2018-01-23 PROCEDURE — 82607 VITAMIN B-12: CPT | Performed by: INTERNAL MEDICINE

## 2018-01-23 PROCEDURE — 80069 RENAL FUNCTION PANEL: CPT | Performed by: INTERNAL MEDICINE

## 2018-01-23 RX ORDER — L.ACID,PARA/B.BIFIDUM/S.THERM 8B CELL
1 CAPSULE ORAL DAILY
Status: DISCONTINUED | OUTPATIENT
Start: 2018-01-23 | End: 2018-01-25 | Stop reason: HOSPADM

## 2018-01-23 RX ADMIN — TRAZODONE HYDROCHLORIDE 100 MG: 50 TABLET ORAL at 20:59

## 2018-01-23 RX ADMIN — CITALOPRAM HYDROBROMIDE 20 MG: 20 TABLET ORAL at 08:49

## 2018-01-23 RX ADMIN — SODIUM BICARBONATE 650 MG TABLET 650 MG: at 20:59

## 2018-01-23 RX ADMIN — HYDROCODONE BITARTRATE AND ACETAMINOPHEN 1 TABLET: 5; 325 TABLET ORAL at 01:06

## 2018-01-23 RX ADMIN — PANTOPRAZOLE SODIUM 40 MG: 40 TABLET, DELAYED RELEASE ORAL at 17:28

## 2018-01-23 RX ADMIN — CLONIDINE HYDROCHLORIDE 0.1 MG: 0.1 TABLET ORAL at 20:59

## 2018-01-23 RX ADMIN — TAMSULOSIN HYDROCHLORIDE 0.4 MG: 0.4 CAPSULE ORAL at 20:58

## 2018-01-23 RX ADMIN — HYDRALAZINE HYDROCHLORIDE 50 MG: 50 TABLET ORAL at 14:43

## 2018-01-23 RX ADMIN — Medication 1 CAPSULE: at 21:00

## 2018-01-23 RX ADMIN — INSULIN ASPART 6 UNITS: 100 INJECTION, SOLUTION INTRAVENOUS; SUBCUTANEOUS at 17:28

## 2018-01-23 RX ADMIN — SODIUM BICARBONATE 650 MG TABLET 650 MG: at 08:48

## 2018-01-23 RX ADMIN — INSULIN DETEMIR 20 UNITS: 100 INJECTION, SOLUTION SUBCUTANEOUS at 23:04

## 2018-01-23 RX ADMIN — HYDRALAZINE HYDROCHLORIDE 50 MG: 50 TABLET ORAL at 06:03

## 2018-01-23 RX ADMIN — SODIUM CHLORIDE 100 ML/HR: 9 INJECTION, SOLUTION INTRAVENOUS at 08:50

## 2018-01-23 RX ADMIN — CLONIDINE HYDROCHLORIDE 0.1 MG: 0.1 TABLET ORAL at 08:50

## 2018-01-23 RX ADMIN — GABAPENTIN 300 MG: 300 CAPSULE ORAL at 08:48

## 2018-01-23 RX ADMIN — HYDROCODONE BITARTRATE AND ACETAMINOPHEN 1 TABLET: 5; 325 TABLET ORAL at 15:17

## 2018-01-23 RX ADMIN — OLANZAPINE 5 MG: 5 TABLET, FILM COATED ORAL at 20:58

## 2018-01-23 RX ADMIN — HYDROCODONE BITARTRATE AND ACETAMINOPHEN 1 TABLET: 5; 325 TABLET ORAL at 21:01

## 2018-01-23 RX ADMIN — BUPROPION HYDROCHLORIDE 200 MG: 100 TABLET, FILM COATED, EXTENDED RELEASE ORAL at 08:48

## 2018-01-23 RX ADMIN — INSULIN ASPART 6 UNITS: 100 INJECTION, SOLUTION INTRAVENOUS; SUBCUTANEOUS at 08:49

## 2018-01-23 RX ADMIN — METOPROLOL TARTRATE 50 MG: 50 TABLET, FILM COATED ORAL at 08:49

## 2018-01-23 RX ADMIN — METOPROLOL TARTRATE 50 MG: 50 TABLET, FILM COATED ORAL at 20:58

## 2018-01-23 RX ADMIN — INSULIN ASPART 8 UNITS: 100 INJECTION, SOLUTION INTRAVENOUS; SUBCUTANEOUS at 12:26

## 2018-01-23 RX ADMIN — GABAPENTIN 300 MG: 300 CAPSULE ORAL at 20:58

## 2018-01-23 RX ADMIN — INSULIN ASPART 4 UNITS: 100 INJECTION, SOLUTION INTRAVENOUS; SUBCUTANEOUS at 23:05

## 2018-01-23 RX ADMIN — MINOCYCLINE HYDROCHLORIDE 100 MG: 50 CAPSULE ORAL at 20:59

## 2018-01-23 RX ADMIN — AMLODIPINE BESYLATE 10 MG: 5 TABLET ORAL at 08:49

## 2018-01-23 RX ADMIN — PANTOPRAZOLE SODIUM 40 MG: 40 TABLET, DELAYED RELEASE ORAL at 06:03

## 2018-01-23 RX ADMIN — MINOCYCLINE HYDROCHLORIDE 100 MG: 50 CAPSULE ORAL at 08:48

## 2018-01-23 RX ADMIN — HYDROCODONE BITARTRATE AND ACETAMINOPHEN 1 TABLET: 5; 325 TABLET ORAL at 08:56

## 2018-01-23 RX ADMIN — SODIUM BICARBONATE 650 MG TABLET 650 MG: at 15:18

## 2018-01-23 RX ADMIN — HYDRALAZINE HYDROCHLORIDE 50 MG: 50 TABLET ORAL at 23:05

## 2018-01-24 LAB
ANION GAP SERPL CALCULATED.3IONS-SCNC: 12.7 MMOL/L
BASOPHILS # BLD AUTO: 0.09 10*3/MM3 (ref 0–0.2)
BASOPHILS NFR BLD AUTO: 0.5 % (ref 0–2)
BUN BLD-MCNC: 39 MG/DL (ref 8–23)
BUN/CREAT SERPL: 10.5 (ref 7–25)
CALCIUM SPEC-SCNC: 7.9 MG/DL (ref 8.8–10.5)
CHLORIDE SERPL-SCNC: 103 MMOL/L (ref 98–107)
CO2 SERPL-SCNC: 18.3 MMOL/L (ref 22–29)
CREAT BLD-MCNC: 3.7 MG/DL (ref 0.76–1.27)
DEPRECATED RDW RBC AUTO: 44.3 FL (ref 37–54)
EOSINOPHIL # BLD AUTO: 0.01 10*3/MM3 (ref 0.1–0.3)
EOSINOPHIL NFR BLD AUTO: 0.1 % (ref 0–4)
ERYTHROCYTE [DISTWIDTH] IN BLOOD BY AUTOMATED COUNT: 13.4 % (ref 11.5–14.5)
GFR SERPL CREATININE-BSD FRML MDRD: 17 ML/MIN/1.73
GLUCOSE BLD-MCNC: 362 MG/DL (ref 65–99)
GLUCOSE BLD-MCNC: 362 MG/DL (ref 65–99)
GLUCOSE BLDC GLUCOMTR-MCNC: 142 MG/DL (ref 70–130)
GLUCOSE BLDC GLUCOMTR-MCNC: 204 MG/DL (ref 70–130)
GLUCOSE BLDC GLUCOMTR-MCNC: 309 MG/DL (ref 70–130)
GLUCOSE BLDC GLUCOMTR-MCNC: 326 MG/DL (ref 70–130)
GLUCOSE BLDC GLUCOMTR-MCNC: 406 MG/DL (ref 70–130)
HCT VFR BLD AUTO: 27 % (ref 42–52)
HGB BLD-MCNC: 8.5 G/DL (ref 14–18)
IMM GRANULOCYTES # BLD: 0.7 10*3/MM3 (ref 0–0.03)
IMM GRANULOCYTES NFR BLD: 4.2 % (ref 0–0.5)
LAB AP CASE REPORT: NORMAL
LAB AP CASE REPORT: NORMAL
LAB AP CLINICAL INFORMATION: NORMAL
LAB AP CLINICAL INFORMATION: NORMAL
LYMPHOCYTES # BLD AUTO: 1.44 10*3/MM3 (ref 0.6–4.8)
LYMPHOCYTES NFR BLD AUTO: 8.6 % (ref 20–45)
Lab: NORMAL
Lab: NORMAL
MCH RBC QN AUTO: 28.4 PG (ref 27–31)
MCHC RBC AUTO-ENTMCNC: 31.5 G/DL (ref 31–37)
MCV RBC AUTO: 90.3 FL (ref 80–94)
MONOCYTES # BLD AUTO: 0.95 10*3/MM3 (ref 0–1)
MONOCYTES NFR BLD AUTO: 5.7 % (ref 3–8)
NEUTROPHILS # BLD AUTO: 13.55 10*3/MM3 (ref 1.5–8.3)
NEUTROPHILS NFR BLD AUTO: 80.9 % (ref 45–70)
NRBC BLD MANUAL-RTO: 0 /100 WBC (ref 0–0)
PATH REPORT.FINAL DX SPEC: NORMAL
PATH REPORT.FINAL DX SPEC: NORMAL
PLATELET # BLD AUTO: 304 10*3/MM3 (ref 140–500)
PMV BLD AUTO: 10.6 FL (ref 7.4–10.4)
POTASSIUM BLD-SCNC: 4.4 MMOL/L (ref 3.5–5.2)
RBC # BLD AUTO: 2.99 10*6/MM3 (ref 4.7–6.1)
SODIUM BLD-SCNC: 134 MMOL/L (ref 136–145)
WBC NRBC COR # BLD: 16.74 10*3/MM3 (ref 4.8–10.8)

## 2018-01-24 PROCEDURE — 82947 ASSAY GLUCOSE BLOOD QUANT: CPT | Performed by: NURSE PRACTITIONER

## 2018-01-24 PROCEDURE — 85025 COMPLETE CBC W/AUTO DIFF WBC: CPT | Performed by: INTERNAL MEDICINE

## 2018-01-24 PROCEDURE — 82962 GLUCOSE BLOOD TEST: CPT

## 2018-01-24 PROCEDURE — 63710000001 INSULIN ASPART PER 5 UNITS: Performed by: HOSPITALIST

## 2018-01-24 PROCEDURE — 97110 THERAPEUTIC EXERCISES: CPT

## 2018-01-24 PROCEDURE — 63710000001 INSULIN ASPART PER 5 UNITS: Performed by: NURSE PRACTITIONER

## 2018-01-24 PROCEDURE — 63710000001 INSULIN REGULAR HUMAN PER 5 UNITS: Performed by: NURSE PRACTITIONER

## 2018-01-24 PROCEDURE — 87040 BLOOD CULTURE FOR BACTERIA: CPT | Performed by: INTERNAL MEDICINE

## 2018-01-24 PROCEDURE — 99024 POSTOP FOLLOW-UP VISIT: CPT | Performed by: SURGERY

## 2018-01-24 PROCEDURE — 80048 BASIC METABOLIC PNL TOTAL CA: CPT | Performed by: INTERNAL MEDICINE

## 2018-01-24 PROCEDURE — 25010000002 IRON SUCROSE PER 1 MG: Performed by: INTERNAL MEDICINE

## 2018-01-24 PROCEDURE — 63710000001 INSULIN DETEMIR PER 5 UNITS: Performed by: HOSPITALIST

## 2018-01-24 PROCEDURE — 99232 SBSQ HOSP IP/OBS MODERATE 35: CPT | Performed by: FAMILY MEDICINE

## 2018-01-24 RX ORDER — NICOTINE POLACRILEX 4 MG
15 LOZENGE BUCCAL
Status: DISCONTINUED | OUTPATIENT
Start: 2018-01-24 | End: 2018-01-25 | Stop reason: HOSPADM

## 2018-01-24 RX ORDER — DEXTROSE MONOHYDRATE 25 G/50ML
25 INJECTION, SOLUTION INTRAVENOUS
Status: DISCONTINUED | OUTPATIENT
Start: 2018-01-24 | End: 2018-01-25 | Stop reason: HOSPADM

## 2018-01-24 RX ORDER — HYDROCODONE BITARTRATE AND ACETAMINOPHEN 5; 325 MG/1; MG/1
1 TABLET ORAL EVERY 4 HOURS PRN
Status: DISCONTINUED | OUTPATIENT
Start: 2018-01-24 | End: 2018-01-25 | Stop reason: HOSPADM

## 2018-01-24 RX ORDER — HYDROCODONE BITARTRATE AND ACETAMINOPHEN 5; 325 MG/1; MG/1
TABLET ORAL
Status: COMPLETED
Start: 2018-01-24 | End: 2018-01-24

## 2018-01-24 RX ADMIN — HYDROCODONE BITARTRATE AND ACETAMINOPHEN 1 TABLET: 5; 325 TABLET ORAL at 19:44

## 2018-01-24 RX ADMIN — AMLODIPINE BESYLATE 10 MG: 5 TABLET ORAL at 09:24

## 2018-01-24 RX ADMIN — HYDROCODONE BITARTRATE AND ACETAMINOPHEN 1 TABLET: 5; 325 TABLET ORAL at 06:36

## 2018-01-24 RX ADMIN — SODIUM BICARBONATE 650 MG TABLET 650 MG: at 15:52

## 2018-01-24 RX ADMIN — HYDROCODONE BITARTRATE AND ACETAMINOPHEN 1 TABLET: 5; 325 TABLET ORAL at 10:09

## 2018-01-24 RX ADMIN — CLONIDINE HYDROCHLORIDE 0.1 MG: 0.1 TABLET ORAL at 22:18

## 2018-01-24 RX ADMIN — HYDRALAZINE HYDROCHLORIDE 75 MG: 50 TABLET ORAL at 06:36

## 2018-01-24 RX ADMIN — BUPROPION HYDROCHLORIDE 200 MG: 100 TABLET, FILM COATED, EXTENDED RELEASE ORAL at 09:28

## 2018-01-24 RX ADMIN — HYDROCODONE BITARTRATE AND ACETAMINOPHEN 1 TABLET: 5; 325 TABLET ORAL at 01:57

## 2018-01-24 RX ADMIN — INSULIN DETEMIR 20 UNITS: 100 INJECTION, SOLUTION SUBCUTANEOUS at 22:14

## 2018-01-24 RX ADMIN — TRAZODONE HYDROCHLORIDE 100 MG: 50 TABLET ORAL at 22:17

## 2018-01-24 RX ADMIN — METOPROLOL TARTRATE 50 MG: 50 TABLET, FILM COATED ORAL at 22:17

## 2018-01-24 RX ADMIN — Medication 1 CAPSULE: at 09:25

## 2018-01-24 RX ADMIN — INSULIN ASPART 4 UNITS: 100 INJECTION, SOLUTION INTRAVENOUS; SUBCUTANEOUS at 09:27

## 2018-01-24 RX ADMIN — CITALOPRAM HYDROBROMIDE 20 MG: 20 TABLET ORAL at 09:28

## 2018-01-24 RX ADMIN — CLONIDINE HYDROCHLORIDE 0.1 MG: 0.1 TABLET ORAL at 09:27

## 2018-01-24 RX ADMIN — TAMSULOSIN HYDROCHLORIDE 0.4 MG: 0.4 CAPSULE ORAL at 22:17

## 2018-01-24 RX ADMIN — GABAPENTIN 300 MG: 300 CAPSULE ORAL at 09:41

## 2018-01-24 RX ADMIN — MINOCYCLINE HYDROCHLORIDE 100 MG: 50 CAPSULE ORAL at 09:29

## 2018-01-24 RX ADMIN — METOPROLOL TARTRATE 50 MG: 50 TABLET, FILM COATED ORAL at 09:27

## 2018-01-24 RX ADMIN — SODIUM BICARBONATE 650 MG TABLET 650 MG: at 09:24

## 2018-01-24 RX ADMIN — GABAPENTIN 300 MG: 300 CAPSULE ORAL at 22:17

## 2018-01-24 RX ADMIN — MINOCYCLINE HYDROCHLORIDE 100 MG: 50 CAPSULE ORAL at 22:16

## 2018-01-24 RX ADMIN — PANTOPRAZOLE SODIUM 40 MG: 40 TABLET, DELAYED RELEASE ORAL at 06:36

## 2018-01-24 RX ADMIN — INSULIN ASPART 10 UNITS: 100 INJECTION, SOLUTION INTRAVENOUS; SUBCUTANEOUS at 17:15

## 2018-01-24 RX ADMIN — SODIUM BICARBONATE 650 MG TABLET 650 MG: at 22:17

## 2018-01-24 RX ADMIN — OLANZAPINE 5 MG: 5 TABLET, FILM COATED ORAL at 22:21

## 2018-01-24 RX ADMIN — HYDRALAZINE HYDROCHLORIDE 75 MG: 50 TABLET ORAL at 13:17

## 2018-01-24 RX ADMIN — IRON SUCROSE 200 MG: 20 INJECTION, SOLUTION INTRAVENOUS at 12:14

## 2018-01-24 RX ADMIN — HUMAN INSULIN 10 UNITS: 100 INJECTION, SOLUTION SUBCUTANEOUS at 12:14

## 2018-01-24 RX ADMIN — HYDRALAZINE HYDROCHLORIDE 75 MG: 50 TABLET ORAL at 22:17

## 2018-01-24 RX ADMIN — PANTOPRAZOLE SODIUM 40 MG: 40 TABLET, DELAYED RELEASE ORAL at 17:17

## 2018-01-25 VITALS
HEIGHT: 72 IN | BODY MASS INDEX: 24.99 KG/M2 | OXYGEN SATURATION: 93 % | TEMPERATURE: 97.7 F | DIASTOLIC BLOOD PRESSURE: 90 MMHG | RESPIRATION RATE: 18 BRPM | SYSTOLIC BLOOD PRESSURE: 170 MMHG | WEIGHT: 184.5 LBS | HEART RATE: 68 BPM

## 2018-01-25 LAB
25(OH)D3 SERPL-MCNC: 19.7 NG/ML
ANION GAP SERPL CALCULATED.3IONS-SCNC: 9.3 MMOL/L
BASOPHILS # BLD AUTO: 0.1 10*3/MM3 (ref 0–0.2)
BASOPHILS NFR BLD AUTO: 0.7 % (ref 0–2)
BILIRUB UR QL STRIP: NEGATIVE
BUN BLD-MCNC: 39 MG/DL (ref 8–23)
BUN/CREAT SERPL: 10.9 (ref 7–25)
CALCIUM SPEC-SCNC: 8.2 MG/DL (ref 8.8–10.5)
CHLORIDE SERPL-SCNC: 105 MMOL/L (ref 98–107)
CLARITY UR: CLEAR
CO2 SERPL-SCNC: 22.7 MMOL/L (ref 22–29)
COLOR UR: YELLOW
CREAT BLD-MCNC: 3.57 MG/DL (ref 0.76–1.27)
DEPRECATED RDW RBC AUTO: 43.3 FL (ref 37–54)
EOSINOPHIL # BLD AUTO: 0.01 10*3/MM3 (ref 0.1–0.3)
EOSINOPHIL NFR BLD AUTO: 0.1 % (ref 0–4)
ERYTHROCYTE [DISTWIDTH] IN BLOOD BY AUTOMATED COUNT: 13.5 % (ref 11.5–14.5)
GFR SERPL CREATININE-BSD FRML MDRD: 17 ML/MIN/1.73
GLUCOSE BLD-MCNC: 172 MG/DL (ref 65–99)
GLUCOSE BLDC GLUCOMTR-MCNC: 166 MG/DL (ref 70–130)
GLUCOSE BLDC GLUCOMTR-MCNC: 167 MG/DL (ref 70–130)
GLUCOSE BLDC GLUCOMTR-MCNC: 181 MG/DL (ref 70–130)
GLUCOSE UR STRIP-MCNC: ABNORMAL MG/DL
HCT VFR BLD AUTO: 25.6 % (ref 42–52)
HGB BLD-MCNC: 8.3 G/DL (ref 14–18)
HGB UR QL STRIP.AUTO: NEGATIVE
IMM GRANULOCYTES # BLD: 0.8 10*3/MM3 (ref 0–0.03)
IMM GRANULOCYTES NFR BLD: 5.3 % (ref 0–0.5)
KETONES UR QL STRIP: NEGATIVE
LEUKOCYTE ESTERASE UR QL STRIP.AUTO: NEGATIVE
LYMPHOCYTES # BLD AUTO: 1.83 10*3/MM3 (ref 0.6–4.8)
LYMPHOCYTES NFR BLD AUTO: 12.2 % (ref 20–45)
MAGNESIUM SERPL-MCNC: 1.8 MG/DL (ref 1.7–2.5)
MCH RBC QN AUTO: 28.7 PG (ref 27–31)
MCHC RBC AUTO-ENTMCNC: 32.4 G/DL (ref 31–37)
MCV RBC AUTO: 88.6 FL (ref 80–94)
MONOCYTES # BLD AUTO: 0.91 10*3/MM3 (ref 0–1)
MONOCYTES NFR BLD AUTO: 6.1 % (ref 3–8)
NEUTROPHILS # BLD AUTO: 11.33 10*3/MM3 (ref 1.5–8.3)
NEUTROPHILS NFR BLD AUTO: 75.6 % (ref 45–70)
NITRITE UR QL STRIP: NEGATIVE
NRBC BLD MANUAL-RTO: 0 /100 WBC (ref 0–0)
PH UR STRIP.AUTO: 6.5 [PH] (ref 4.5–8)
PHOSPHATE SERPL-MCNC: 3.6 MG/DL (ref 2.7–4.5)
PLAT MORPH BLD: NORMAL
PLATELET # BLD AUTO: 345 10*3/MM3 (ref 140–500)
PMV BLD AUTO: 10.6 FL (ref 7.4–10.4)
POIKILOCYTOSIS BLD QL SMEAR: NORMAL
POTASSIUM BLD-SCNC: 4.2 MMOL/L (ref 3.5–5.2)
PROT UR QL STRIP: ABNORMAL
RBC # BLD AUTO: 2.89 10*6/MM3 (ref 4.7–6.1)
SODIUM BLD-SCNC: 137 MMOL/L (ref 136–145)
SP GR UR STRIP: 1.01 (ref 1–1.03)
UROBILINOGEN UR QL STRIP: ABNORMAL
WBC MORPH BLD: NORMAL
WBC NRBC COR # BLD: 14.98 10*3/MM3 (ref 4.8–10.8)

## 2018-01-25 PROCEDURE — 80048 BASIC METABOLIC PNL TOTAL CA: CPT | Performed by: INTERNAL MEDICINE

## 2018-01-25 PROCEDURE — 83735 ASSAY OF MAGNESIUM: CPT | Performed by: INTERNAL MEDICINE

## 2018-01-25 PROCEDURE — 81003 URINALYSIS AUTO W/O SCOPE: CPT | Performed by: INTERNAL MEDICINE

## 2018-01-25 PROCEDURE — 82306 VITAMIN D 25 HYDROXY: CPT | Performed by: HOSPITALIST

## 2018-01-25 PROCEDURE — 85025 COMPLETE CBC W/AUTO DIFF WBC: CPT | Performed by: INTERNAL MEDICINE

## 2018-01-25 PROCEDURE — 84100 ASSAY OF PHOSPHORUS: CPT | Performed by: INTERNAL MEDICINE

## 2018-01-25 PROCEDURE — 63710000001 INSULIN ASPART PER 5 UNITS: Performed by: NURSE PRACTITIONER

## 2018-01-25 PROCEDURE — 85007 BL SMEAR W/DIFF WBC COUNT: CPT | Performed by: INTERNAL MEDICINE

## 2018-01-25 PROCEDURE — 99238 HOSP IP/OBS DSCHRG MGMT 30/<: CPT | Performed by: HOSPITALIST

## 2018-01-25 PROCEDURE — 82962 GLUCOSE BLOOD TEST: CPT

## 2018-01-25 RX ORDER — HYDRALAZINE HYDROCHLORIDE 25 MG/1
75 TABLET, FILM COATED ORAL EVERY 8 HOURS SCHEDULED
Start: 2018-01-25 | End: 2018-02-13 | Stop reason: HOSPADM

## 2018-01-25 RX ORDER — MINOCYCLINE HYDROCHLORIDE 100 MG/1
100 CAPSULE ORAL EVERY 12 HOURS SCHEDULED
Qty: 14 CAPSULE | Refills: 0
Start: 2018-01-25 | End: 2018-02-01

## 2018-01-25 RX ORDER — PANTOPRAZOLE SODIUM 40 MG/1
40 TABLET, DELAYED RELEASE ORAL
Start: 2018-01-25 | End: 2018-04-12

## 2018-01-25 RX ORDER — METOPROLOL TARTRATE 50 MG/1
50 TABLET, FILM COATED ORAL EVERY 12 HOURS SCHEDULED
Start: 2018-01-25 | End: 2018-04-12

## 2018-01-25 RX ORDER — GABAPENTIN 300 MG/1
300 CAPSULE ORAL EVERY 12 HOURS SCHEDULED
Start: 2018-01-25

## 2018-01-25 RX ORDER — PETROLATUM 42 G/100G
OINTMENT TOPICAL AS NEEDED
Start: 2018-01-25 | End: 2018-04-12

## 2018-01-25 RX ORDER — L.ACID,PARA/B.BIFIDUM/S.THERM 8B CELL
1 CAPSULE ORAL DAILY
Qty: 10 CAPSULE | Refills: 0 | Status: SHIPPED | OUTPATIENT
Start: 2018-01-26 | End: 2018-02-13 | Stop reason: HOSPADM

## 2018-01-25 RX ORDER — AMLODIPINE BESYLATE 10 MG/1
10 TABLET ORAL
Start: 2018-01-26

## 2018-01-25 RX ORDER — TRAZODONE HYDROCHLORIDE 100 MG/1
100 TABLET ORAL NIGHTLY
Start: 2018-01-25

## 2018-01-25 RX ORDER — BUPROPION HYDROCHLORIDE 200 MG/1
200 TABLET, EXTENDED RELEASE ORAL DAILY
Start: 2018-01-26

## 2018-01-25 RX ORDER — SODIUM BICARBONATE 650 MG/1
650 TABLET ORAL 3 TIMES DAILY
Start: 2018-01-25 | End: 2018-04-12

## 2018-01-25 RX ORDER — HYDROCODONE BITARTRATE AND ACETAMINOPHEN 5; 325 MG/1; MG/1
1 TABLET ORAL EVERY 4 HOURS PRN
Qty: 12 TABLET | Refills: 0 | Status: SHIPPED | OUTPATIENT
Start: 2018-01-25 | End: 2018-02-13 | Stop reason: HOSPADM

## 2018-01-25 RX ORDER — TAMSULOSIN HYDROCHLORIDE 0.4 MG/1
0.4 CAPSULE ORAL NIGHTLY
Qty: 30 CAPSULE
Start: 2018-01-25

## 2018-01-25 RX ADMIN — MINOCYCLINE HYDROCHLORIDE 100 MG: 50 CAPSULE ORAL at 08:54

## 2018-01-25 RX ADMIN — COLLAGENASE SANTYL: 250 OINTMENT TOPICAL at 08:55

## 2018-01-25 RX ADMIN — PANTOPRAZOLE SODIUM 40 MG: 40 TABLET, DELAYED RELEASE ORAL at 08:54

## 2018-01-25 RX ADMIN — CITALOPRAM HYDROBROMIDE 20 MG: 20 TABLET ORAL at 08:54

## 2018-01-25 RX ADMIN — CLONIDINE HYDROCHLORIDE 0.1 MG: 0.1 TABLET ORAL at 08:54

## 2018-01-25 RX ADMIN — SODIUM BICARBONATE 650 MG TABLET 650 MG: at 08:54

## 2018-01-25 RX ADMIN — INSULIN ASPART 3 UNITS: 100 INJECTION, SOLUTION INTRAVENOUS; SUBCUTANEOUS at 12:46

## 2018-01-25 RX ADMIN — Medication 1 CAPSULE: at 08:54

## 2018-01-25 RX ADMIN — METOPROLOL TARTRATE 50 MG: 50 TABLET, FILM COATED ORAL at 08:54

## 2018-01-25 RX ADMIN — AMLODIPINE BESYLATE 10 MG: 5 TABLET ORAL at 08:54

## 2018-01-25 RX ADMIN — HYDRALAZINE HYDROCHLORIDE 75 MG: 50 TABLET ORAL at 06:24

## 2018-01-25 RX ADMIN — GABAPENTIN 300 MG: 300 CAPSULE ORAL at 08:54

## 2018-01-25 RX ADMIN — INSULIN ASPART 3 UNITS: 100 INJECTION, SOLUTION INTRAVENOUS; SUBCUTANEOUS at 08:55

## 2018-01-25 RX ADMIN — HYDROCODONE BITARTRATE AND ACETAMINOPHEN 1 TABLET: 5; 325 TABLET ORAL at 06:24

## 2018-01-25 RX ADMIN — BUPROPION HYDROCHLORIDE 200 MG: 100 TABLET, FILM COATED, EXTENDED RELEASE ORAL at 08:54

## 2018-01-26 LAB — BACTERIA SPEC ANAEROBE CULT: NORMAL

## 2018-01-29 LAB — BACTERIA SPEC AEROBE CULT: NORMAL

## 2018-02-05 ENCOUNTER — HOSPITAL ENCOUNTER (INPATIENT)
Facility: HOSPITAL | Age: 68
LOS: 8 days | Discharge: NURSING, REHAB OR LONG-TERM CARE | End: 2018-02-13
Attending: INTERNAL MEDICINE | Admitting: INTERNAL MEDICINE

## 2018-02-05 ENCOUNTER — APPOINTMENT (OUTPATIENT)
Dept: ULTRASOUND IMAGING | Facility: HOSPITAL | Age: 68
End: 2018-02-05

## 2018-02-05 LAB
BH CV VAS MEAS BASILIC ANTECUBITAL FOSSA LEFT: 0.2 CM
BH CV VAS MEAS BASILIC ANTECUBITAL FOSSA RIGHT: 0.27 CM
BH CV VAS MEAS BASILIC FOREARM LEFT - DIST: 0.16 CM
BH CV VAS MEAS BASILIC FOREARM LEFT - MID: 0.18 CM
BH CV VAS MEAS BASILIC FOREARM LEFT - PROX: 0.21 CM
BH CV VAS MEAS BASILIC FOREARM RIGHT - DIST: 0.32 CM
BH CV VAS MEAS BASILIC FOREARM RIGHT - MID: 0.2 CM
BH CV VAS MEAS BASILIC FOREARM RIGHT - PROX: 0.24 CM
BH CV VAS MEAS BASILIC UPPER ARM LEFT - DIST: 0.28 CM
BH CV VAS MEAS BASILIC UPPER ARM LEFT - MID: 0.34 CM
BH CV VAS MEAS BASILIC UPPER ARM LEFT - PROX: 0.37 CM
BH CV VAS MEAS BASILIC UPPER ARM RIGHT - DIST: 0.38 CM
BH CV VAS MEAS BASILIC UPPER ARM RIGHT - MID: 0.37 CM
BH CV VAS MEAS BASILIC UPPER ARM RIGHT - PROX: 0.5 CM
BH CV VAS MEAS CEPHALIC ANTECUBITAL FOSSA LEFT: 0.22 CM
BH CV VAS MEAS CEPHALIC ANTECUBITAL FOSSA RIGHT: 0.28 CM
BH CV VAS MEAS CEPHALIC FOREARM LEFT - MID: 0.22 CM
BH CV VAS MEAS CEPHALIC FOREARM LEFT - PROX: 0.18 CM
BH CV VAS MEAS CEPHALIC FOREARM RIGHT - DIST: 0.23 CM
BH CV VAS MEAS CEPHALIC FOREARM RIGHT - MID: 0.24 CM
BH CV VAS MEAS CEPHALIC FOREARM RIGHT - PROX: 0.29 CM
BH CV VAS MEAS CEPHALIC UPPER ARM LEFT - DIST: 0.13 CM
BH CV VAS MEAS CEPHALIC UPPER ARM LEFT - MID: 0.12 CM
BH CV VAS MEAS CEPHALIC UPPER ARM LEFT - PROX: 0.17 CM
BH CV VAS MEAS CEPHALIC UPPER ARM RIGHT - DIST: 0.2 CM
BH CV VAS MEAS CEPHALIC UPPER ARM RIGHT - MID: 0.2 CM
BH CV VAS MEAS CEPHALIC UPPER ARM RIGHT - PROX: 0.21 CM
BH CV VAS MEAS RADIAL UPPER ARM LEFT - DIST: 0.25 CM
BH CV VAS MEAS RADIAL UPPER ARM LEFT - MID: 0.19 CM
BH CV VAS MEAS RADIAL UPPER ARM LEFT - PROX: 0.27 CM
BH CV VAS MEAS RADIAL UPPER ARM RIGHT - DIST: 0.25 CM
BH CV VAS MEAS RADIAL UPPER ARM RIGHT - MID: 0.26 CM
BH CV VAS MEAS RADIAL UPPER ARM RIGHT - PROX: 0.29 CM
GLUCOSE BLDC GLUCOMTR-MCNC: 238 MG/DL (ref 70–130)
UPPER ARTERIAL LEFT ARM BRACHIAL LENGTH: 0.53 CM
UPPER ARTERIAL RIGHT ARM BRACHIAL LENGTH: 0.61 CM

## 2018-02-05 PROCEDURE — 82962 GLUCOSE BLOOD TEST: CPT

## 2018-02-05 PROCEDURE — 76775 US EXAM ABDO BACK WALL LIM: CPT

## 2018-02-05 RX ORDER — PANTOPRAZOLE SODIUM 40 MG/1
40 TABLET, DELAYED RELEASE ORAL
Status: DISCONTINUED | OUTPATIENT
Start: 2018-02-06 | End: 2018-02-06

## 2018-02-05 RX ORDER — FUROSEMIDE 40 MG/1
40 TABLET ORAL 3 TIMES DAILY
Status: DISCONTINUED | OUTPATIENT
Start: 2018-02-05 | End: 2018-02-06

## 2018-02-05 RX ORDER — AMLODIPINE BESYLATE 10 MG/1
10 TABLET ORAL
Status: DISCONTINUED | OUTPATIENT
Start: 2018-02-06 | End: 2018-02-13 | Stop reason: HOSPADM

## 2018-02-05 RX ORDER — BUPROPION HYDROCHLORIDE 100 MG/1
200 TABLET, EXTENDED RELEASE ORAL EVERY 12 HOURS SCHEDULED
Status: DISCONTINUED | OUTPATIENT
Start: 2018-02-05 | End: 2018-02-13 | Stop reason: HOSPADM

## 2018-02-05 RX ORDER — NICOTINE POLACRILEX 4 MG
15 LOZENGE BUCCAL
Status: DISCONTINUED | OUTPATIENT
Start: 2018-02-05 | End: 2018-02-13 | Stop reason: HOSPADM

## 2018-02-05 RX ORDER — ASPIRIN 81 MG/1
81 TABLET, CHEWABLE ORAL DAILY
Status: DISCONTINUED | OUTPATIENT
Start: 2018-02-06 | End: 2018-02-13 | Stop reason: HOSPADM

## 2018-02-05 RX ORDER — TAMSULOSIN HYDROCHLORIDE 0.4 MG/1
0.4 CAPSULE ORAL NIGHTLY
Status: DISCONTINUED | OUTPATIENT
Start: 2018-02-05 | End: 2018-02-13 | Stop reason: HOSPADM

## 2018-02-05 RX ORDER — TRAZODONE HYDROCHLORIDE 100 MG/1
100 TABLET ORAL NIGHTLY
Status: DISCONTINUED | OUTPATIENT
Start: 2018-02-05 | End: 2018-02-13 | Stop reason: HOSPADM

## 2018-02-05 RX ORDER — RISPERIDONE 1 MG/1
2 TABLET ORAL NIGHTLY
Status: DISCONTINUED | OUTPATIENT
Start: 2018-02-05 | End: 2018-02-13 | Stop reason: HOSPADM

## 2018-02-05 RX ORDER — ACETAMINOPHEN 325 MG/1
650 TABLET ORAL EVERY 6 HOURS PRN
Status: DISCONTINUED | OUTPATIENT
Start: 2018-02-05 | End: 2018-02-13 | Stop reason: HOSPADM

## 2018-02-05 RX ORDER — HYDRALAZINE HYDROCHLORIDE 25 MG/1
25 TABLET, FILM COATED ORAL EVERY 8 HOURS SCHEDULED
Status: DISCONTINUED | OUTPATIENT
Start: 2018-02-05 | End: 2018-02-07

## 2018-02-05 RX ORDER — MELATONIN
1000 DAILY
Status: DISCONTINUED | OUTPATIENT
Start: 2018-02-06 | End: 2018-02-13 | Stop reason: HOSPADM

## 2018-02-05 RX ORDER — GABAPENTIN 300 MG/1
300 CAPSULE ORAL EVERY 12 HOURS SCHEDULED
Status: DISCONTINUED | OUTPATIENT
Start: 2018-02-05 | End: 2018-02-13 | Stop reason: HOSPADM

## 2018-02-05 RX ORDER — CLONIDINE HYDROCHLORIDE 0.1 MG/1
0.1 TABLET ORAL EVERY 12 HOURS SCHEDULED
Status: DISCONTINUED | OUTPATIENT
Start: 2018-02-05 | End: 2018-02-07

## 2018-02-05 RX ORDER — METOPROLOL TARTRATE 50 MG/1
50 TABLET, FILM COATED ORAL EVERY 12 HOURS SCHEDULED
Status: DISCONTINUED | OUTPATIENT
Start: 2018-02-05 | End: 2018-02-13 | Stop reason: HOSPADM

## 2018-02-05 RX ORDER — CITALOPRAM 20 MG/1
20 TABLET ORAL DAILY
Status: DISCONTINUED | OUTPATIENT
Start: 2018-02-06 | End: 2018-02-13 | Stop reason: HOSPADM

## 2018-02-05 RX ORDER — HYDROCODONE BITARTRATE AND ACETAMINOPHEN 5; 325 MG/1; MG/1
1 TABLET ORAL EVERY 4 HOURS PRN
Status: DISCONTINUED | OUTPATIENT
Start: 2018-02-05 | End: 2018-02-13 | Stop reason: HOSPADM

## 2018-02-05 RX ORDER — DEXTROSE MONOHYDRATE 25 G/50ML
25 INJECTION, SOLUTION INTRAVENOUS
Status: DISCONTINUED | OUTPATIENT
Start: 2018-02-05 | End: 2018-02-13 | Stop reason: HOSPADM

## 2018-02-05 RX ORDER — SODIUM BICARBONATE 650 MG/1
650 TABLET ORAL 3 TIMES DAILY
Status: DISCONTINUED | OUTPATIENT
Start: 2018-02-05 | End: 2018-02-13 | Stop reason: HOSPADM

## 2018-02-05 RX ADMIN — HYDRALAZINE HYDROCHLORIDE 25 MG: 25 TABLET, FILM COATED ORAL at 23:56

## 2018-02-05 RX ADMIN — FUROSEMIDE 40 MG: 40 TABLET ORAL at 23:57

## 2018-02-05 RX ADMIN — BUPROPION HYDROCHLORIDE 200 MG: 100 TABLET, EXTENDED RELEASE ORAL at 23:58

## 2018-02-05 RX ADMIN — RISPERIDONE 2 MG: 1 TABLET ORAL at 23:58

## 2018-02-05 RX ADMIN — GABAPENTIN 300 MG: 300 CAPSULE ORAL at 23:56

## 2018-02-05 RX ADMIN — TAMSULOSIN HYDROCHLORIDE 0.4 MG: 0.4 CAPSULE ORAL at 23:57

## 2018-02-05 RX ADMIN — CLONIDINE HYDROCHLORIDE 0.1 MG: 0.1 TABLET ORAL at 23:57

## 2018-02-05 RX ADMIN — METOPROLOL TARTRATE 50 MG: 50 TABLET, FILM COATED ORAL at 23:56

## 2018-02-05 RX ADMIN — HYDROCODONE BITARTRATE AND ACETAMINOPHEN 1 TABLET: 5; 325 TABLET ORAL at 23:56

## 2018-02-05 RX ADMIN — TRAZODONE HYDROCHLORIDE 100 MG: 100 TABLET, FILM COATED ORAL at 23:56

## 2018-02-05 RX ADMIN — SODIUM BICARBONATE 650 MG: 650 TABLET ORAL at 23:57

## 2018-02-06 ENCOUNTER — APPOINTMENT (OUTPATIENT)
Dept: CARDIOLOGY | Facility: HOSPITAL | Age: 68
End: 2018-02-06
Attending: INTERNAL MEDICINE

## 2018-02-06 ENCOUNTER — INPATIENT HOSPITAL (OUTPATIENT)
Dept: URBAN - METROPOLITAN AREA HOSPITAL 27 | Facility: HOSPITAL | Age: 68
End: 2018-02-06
Payer: MEDICARE

## 2018-02-06 ENCOUNTER — APPOINTMENT (OUTPATIENT)
Dept: GENERAL RADIOLOGY | Facility: HOSPITAL | Age: 68
End: 2018-02-06
Attending: INTERNAL MEDICINE

## 2018-02-06 DIAGNOSIS — J96.90 RESPIRATORY FAILURE, UNSPECIFIED, UNSPECIFIED WHETHER WITH H: ICD-10-CM

## 2018-02-06 DIAGNOSIS — R19.7 DIARRHEA, UNSPECIFIED: ICD-10-CM

## 2018-02-06 DIAGNOSIS — K22.10 ULCER OF ESOPHAGUS WITHOUT BLEEDING: ICD-10-CM

## 2018-02-06 DIAGNOSIS — N18.9 CHRONIC KIDNEY DISEASE, UNSPECIFIED: ICD-10-CM

## 2018-02-06 PROBLEM — N17.9 ACUTE RENAL FAILURE (ARF) (HCC): Status: ACTIVE | Noted: 2018-02-06

## 2018-02-06 LAB
ABO GROUP BLD: NORMAL
ANION GAP SERPL CALCULATED.3IONS-SCNC: 12.4 MMOL/L
ARTERIAL PATENCY WRIST A: POSITIVE
ATMOSPHERIC PRESS: 755.3 MMHG
ATMOSPHERIC PRESS: 756.3 MMHG
ATMOSPHERIC PRESS: 758.6 MMHG
B PERT DNA SPEC QL NAA+PROBE: NOT DETECTED
BASE EXCESS BLDA CALC-SCNC: -0.6 MMOL/L (ref 0–2)
BASE EXCESS BLDA CALC-SCNC: 0.1 MMOL/L (ref 0–2)
BASE EXCESS BLDA CALC-SCNC: 0.3 MMOL/L (ref 0–2)
BASOPHILS # BLD AUTO: 0.05 10*3/MM3 (ref 0–0.2)
BASOPHILS NFR BLD AUTO: 0.7 % (ref 0–1.5)
BDY SITE: ABNORMAL
BH CV LOWER VASCULAR LEFT COMMON FEMORAL AUGMENT: NORMAL
BH CV LOWER VASCULAR LEFT COMMON FEMORAL COMPETENT: NORMAL
BH CV LOWER VASCULAR LEFT COMMON FEMORAL COMPRESS: NORMAL
BH CV LOWER VASCULAR LEFT COMMON FEMORAL PHASIC: NORMAL
BH CV LOWER VASCULAR LEFT COMMON FEMORAL SPONT: NORMAL
BH CV LOWER VASCULAR LEFT DISTAL FEMORAL COMPRESS: NORMAL
BH CV LOWER VASCULAR LEFT GASTRONEMIUS COMPRESS: NORMAL
BH CV LOWER VASCULAR LEFT GREATER SAPH AK COMPRESS: NORMAL
BH CV LOWER VASCULAR LEFT GREATER SAPH BK COMPRESS: NORMAL
BH CV LOWER VASCULAR LEFT MID FEMORAL AUGMENT: NORMAL
BH CV LOWER VASCULAR LEFT MID FEMORAL COMPETENT: NORMAL
BH CV LOWER VASCULAR LEFT MID FEMORAL COMPRESS: NORMAL
BH CV LOWER VASCULAR LEFT MID FEMORAL PHASIC: NORMAL
BH CV LOWER VASCULAR LEFT MID FEMORAL SPONT: NORMAL
BH CV LOWER VASCULAR LEFT PERONEAL COMPRESS: NORMAL
BH CV LOWER VASCULAR LEFT POPLITEAL AUGMENT: NORMAL
BH CV LOWER VASCULAR LEFT POPLITEAL COMPETENT: NORMAL
BH CV LOWER VASCULAR LEFT POPLITEAL COMPRESS: NORMAL
BH CV LOWER VASCULAR LEFT POPLITEAL PHASIC: NORMAL
BH CV LOWER VASCULAR LEFT POPLITEAL SPONT: NORMAL
BH CV LOWER VASCULAR LEFT POSTERIOR TIBIAL COMPRESS: NORMAL
BH CV LOWER VASCULAR LEFT PROXIMAL FEMORAL COMPRESS: NORMAL
BH CV LOWER VASCULAR LEFT SAPHENOFEMORAL JUNCTION AUGMENT: NORMAL
BH CV LOWER VASCULAR LEFT SAPHENOFEMORAL JUNCTION COMPRESS: NORMAL
BH CV LOWER VASCULAR LEFT SAPHENOFEMORAL JUNCTION PHASIC: NORMAL
BH CV LOWER VASCULAR LEFT SAPHENOFEMORAL JUNCTION SPONT: NORMAL
BH CV LOWER VASCULAR RIGHT COMMON FEMORAL AUGMENT: NORMAL
BH CV LOWER VASCULAR RIGHT COMMON FEMORAL COMPETENT: NORMAL
BH CV LOWER VASCULAR RIGHT COMMON FEMORAL COMPRESS: NORMAL
BH CV LOWER VASCULAR RIGHT COMMON FEMORAL PHASIC: NORMAL
BH CV LOWER VASCULAR RIGHT COMMON FEMORAL SPONT: NORMAL
BH CV LOWER VASCULAR RIGHT DISTAL FEMORAL COMPRESS: NORMAL
BH CV LOWER VASCULAR RIGHT GASTRONEMIUS COMPRESS: NORMAL
BH CV LOWER VASCULAR RIGHT GREATER SAPH AK COMPRESS: NORMAL
BH CV LOWER VASCULAR RIGHT GREATER SAPH BK COMPRESS: NORMAL
BH CV LOWER VASCULAR RIGHT MID FEMORAL AUGMENT: NORMAL
BH CV LOWER VASCULAR RIGHT MID FEMORAL COMPETENT: NORMAL
BH CV LOWER VASCULAR RIGHT MID FEMORAL COMPRESS: NORMAL
BH CV LOWER VASCULAR RIGHT MID FEMORAL PHASIC: NORMAL
BH CV LOWER VASCULAR RIGHT MID FEMORAL SPONT: NORMAL
BH CV LOWER VASCULAR RIGHT PERONEAL COMPRESS: NORMAL
BH CV LOWER VASCULAR RIGHT POPLITEAL AUGMENT: NORMAL
BH CV LOWER VASCULAR RIGHT POPLITEAL COMPETENT: NORMAL
BH CV LOWER VASCULAR RIGHT POPLITEAL COMPRESS: NORMAL
BH CV LOWER VASCULAR RIGHT POPLITEAL PHASIC: NORMAL
BH CV LOWER VASCULAR RIGHT POPLITEAL SPONT: NORMAL
BH CV LOWER VASCULAR RIGHT POSTERIOR TIBIAL COMPRESS: NORMAL
BH CV LOWER VASCULAR RIGHT PROXIMAL FEMORAL COMPRESS: NORMAL
BH CV LOWER VASCULAR RIGHT SAPHENOFEMORAL JUNCTION AUGMENT: NORMAL
BH CV LOWER VASCULAR RIGHT SAPHENOFEMORAL JUNCTION COMPRESS: NORMAL
BH CV LOWER VASCULAR RIGHT SAPHENOFEMORAL JUNCTION PHASIC: NORMAL
BH CV LOWER VASCULAR RIGHT SAPHENOFEMORAL JUNCTION SPONT: NORMAL
BILIRUB UR QL STRIP: NEGATIVE
BLD GP AB SCN SERPL QL: NEGATIVE
BUN BLD-MCNC: 63 MG/DL (ref 8–23)
BUN/CREAT SERPL: 16.4 (ref 7–25)
C PNEUM DNA NPH QL NAA+NON-PROBE: NOT DETECTED
CALCIUM SPEC-SCNC: 8.3 MG/DL (ref 8.6–10.5)
CHLORIDE SERPL-SCNC: 100 MMOL/L (ref 98–107)
CLARITY UR: CLEAR
CO2 SERPL-SCNC: 22.6 MMOL/L (ref 22–29)
COLOR UR: YELLOW
CREAT BLD-MCNC: 3.85 MG/DL (ref 0.76–1.27)
DEPRECATED RDW RBC AUTO: 49.5 FL (ref 37–54)
EOSINOPHIL # BLD AUTO: 0.11 10*3/MM3 (ref 0–0.7)
EOSINOPHIL NFR BLD AUTO: 1.5 % (ref 0.3–6.2)
ERYTHROCYTE [DISTWIDTH] IN BLOOD BY AUTOMATED COUNT: 15.1 % (ref 11.5–14.5)
FLUAV H1 2009 PAND RNA NPH QL NAA+PROBE: NOT DETECTED
FLUAV H1 HA GENE NPH QL NAA+PROBE: NOT DETECTED
FLUAV H3 RNA NPH QL NAA+PROBE: NOT DETECTED
FLUAV SUBTYP SPEC NAA+PROBE: NOT DETECTED
FLUBV RNA ISLT QL NAA+PROBE: NOT DETECTED
GAS FLOW AIRWAY: 3 LPM
GFR SERPL CREATININE-BSD FRML MDRD: 16 ML/MIN/1.73
GLUCOSE BLD-MCNC: 258 MG/DL (ref 65–99)
GLUCOSE BLDC GLUCOMTR-MCNC: 155 MG/DL (ref 70–130)
GLUCOSE BLDC GLUCOMTR-MCNC: 203 MG/DL (ref 70–130)
GLUCOSE BLDC GLUCOMTR-MCNC: 254 MG/DL (ref 70–130)
GLUCOSE BLDC GLUCOMTR-MCNC: 84 MG/DL (ref 70–130)
GLUCOSE BLDC GLUCOMTR-MCNC: 89 MG/DL (ref 70–130)
GLUCOSE UR STRIP-MCNC: ABNORMAL MG/DL
HADV DNA SPEC NAA+PROBE: NOT DETECTED
HBA1C MFR BLD: 10.6 % (ref 4.8–5.6)
HBV SURFACE AG SERPL QL IA: NORMAL
HCO3 BLDA-SCNC: 25.2 MMOL/L (ref 22–28)
HCO3 BLDA-SCNC: 25.9 MMOL/L (ref 22–28)
HCO3 BLDA-SCNC: 26.5 MMOL/L (ref 22–28)
HCOV 229E RNA SPEC QL NAA+PROBE: NOT DETECTED
HCOV HKU1 RNA SPEC QL NAA+PROBE: NOT DETECTED
HCOV NL63 RNA SPEC QL NAA+PROBE: NOT DETECTED
HCOV OC43 RNA SPEC QL NAA+PROBE: NOT DETECTED
HCT VFR BLD AUTO: 22.9 % (ref 40.4–52.2)
HCT VFR BLD AUTO: 23.1 % (ref 40.4–52.2)
HCT VFR BLD AUTO: 26.5 % (ref 40.4–52.2)
HGB BLD-MCNC: 6.8 G/DL (ref 13.7–17.6)
HGB BLD-MCNC: 6.9 G/DL (ref 13.7–17.6)
HGB BLD-MCNC: 8.2 G/DL (ref 13.7–17.6)
HGB UR QL STRIP.AUTO: NEGATIVE
HMPV RNA NPH QL NAA+NON-PROBE: NOT DETECTED
HOROWITZ INDEX BLD+IHG-RTO: 35 %
HOROWITZ INDEX BLD+IHG-RTO: 35 %
HPIV1 RNA SPEC QL NAA+PROBE: NOT DETECTED
HPIV2 RNA SPEC QL NAA+PROBE: NOT DETECTED
HPIV3 RNA NPH QL NAA+PROBE: NOT DETECTED
HPIV4 P GENE NPH QL NAA+PROBE: NOT DETECTED
IMM GRANULOCYTES # BLD: 0.1 10*3/MM3 (ref 0–0.03)
IMM GRANULOCYTES NFR BLD: 1.3 % (ref 0–0.5)
IRON 24H UR-MRATE: 14 MCG/DL (ref 59–158)
IRON SATN MFR SERPL: 7 % (ref 20–50)
KETONES UR QL STRIP: NEGATIVE
LEUKOCYTE ESTERASE UR QL STRIP.AUTO: NEGATIVE
LYMPHOCYTES # BLD AUTO: 1.06 10*3/MM3 (ref 0.9–4.8)
LYMPHOCYTES NFR BLD AUTO: 14.2 % (ref 19.6–45.3)
M PNEUMO IGG SER IA-ACNC: NOT DETECTED
MAGNESIUM SERPL-MCNC: 2.1 MG/DL (ref 1.6–2.4)
MCH RBC QN AUTO: 27.2 PG (ref 27–32.7)
MCHC RBC AUTO-ENTMCNC: 29.9 G/DL (ref 32.6–36.4)
MCV RBC AUTO: 90.9 FL (ref 79.8–96.2)
MODALITY: ABNORMAL
MONOCYTES # BLD AUTO: 0.45 10*3/MM3 (ref 0.2–1.2)
MONOCYTES NFR BLD AUTO: 6 % (ref 5–12)
NEUTROPHILS # BLD AUTO: 5.71 10*3/MM3 (ref 1.9–8.1)
NEUTROPHILS NFR BLD AUTO: 76.3 % (ref 42.7–76)
NITRITE UR QL STRIP: NEGATIVE
NRBC BLD MANUAL-RTO: 0 /100 WBC (ref 0–0)
O2 A-A PPRESDIFF RESPIRATORY: 0.3 MMHG
O2 A-A PPRESDIFF RESPIRATORY: 0.3 MMHG
PCO2 BLDA: 42.1 MM HG (ref 35–45)
PCO2 BLDA: 52.2 MM HG (ref 35–45)
PCO2 BLDA: 52.7 MM HG (ref 35–45)
PH BLDA: 7.3 PH UNITS (ref 7.35–7.45)
PH BLDA: 7.31 PH UNITS (ref 7.35–7.45)
PH BLDA: 7.38 PH UNITS (ref 7.35–7.45)
PH UR STRIP.AUTO: 5.5 [PH] (ref 5–8)
PHOSPHATE SERPL-MCNC: 6.7 MG/DL (ref 2.5–4.5)
PLATELET # BLD AUTO: 266 10*3/MM3 (ref 140–500)
PMV BLD AUTO: 11.1 FL (ref 6–12)
PO2 BLDA: 61.5 MM HG (ref 80–100)
PO2 BLDA: 67.4 MM HG (ref 80–100)
PO2 BLDA: 71.8 MM HG (ref 80–100)
POTASSIUM BLD-SCNC: 4.9 MMOL/L (ref 3.5–5.2)
PROT UR QL STRIP: ABNORMAL
RBC # BLD AUTO: 2.54 10*6/MM3 (ref 4.6–6)
RH BLD: POSITIVE
RHINOVIRUS RNA SPEC NAA+PROBE: NOT DETECTED
RSV RNA NPH QL NAA+NON-PROBE: NOT DETECTED
SAO2 % BLDCOA: 88.6 % (ref 92–99)
SAO2 % BLDCOA: 90.6 % (ref 92–99)
SAO2 % BLDCOA: 93.9 % (ref 92–99)
SET MECH RESP RATE: 20
SET MECH RESP RATE: 20
SODIUM BLD-SCNC: 135 MMOL/L (ref 136–145)
SP GR UR STRIP: 1.01 (ref 1–1.03)
TIBC SERPL-MCNC: 188 MCG/DL
TOTAL RATE: 16 BREATHS/MINUTE
TOTAL RATE: 22 BREATHS/MINUTE
TOTAL RATE: 23 BREATHS/MINUTE
TRANSFERRIN SERPL-MCNC: 126 MG/DL (ref 200–360)
UROBILINOGEN UR QL STRIP: ABNORMAL
WBC NRBC COR # BLD: 7.48 10*3/MM3 (ref 4.5–10.7)

## 2018-02-06 PROCEDURE — 86923 COMPATIBILITY TEST ELECTRIC: CPT

## 2018-02-06 PROCEDURE — 83540 ASSAY OF IRON: CPT | Performed by: INTERNAL MEDICINE

## 2018-02-06 PROCEDURE — 94799 UNLISTED PULMONARY SVC/PX: CPT

## 2018-02-06 PROCEDURE — 81003 URINALYSIS AUTO W/O SCOPE: CPT | Performed by: INTERNAL MEDICINE

## 2018-02-06 PROCEDURE — 82803 BLOOD GASES ANY COMBINATION: CPT

## 2018-02-06 PROCEDURE — 87581 M.PNEUMON DNA AMP PROBE: CPT | Performed by: INTERNAL MEDICINE

## 2018-02-06 PROCEDURE — 36430 TRANSFUSION BLD/BLD COMPNT: CPT

## 2018-02-06 PROCEDURE — 87486 CHLMYD PNEUM DNA AMP PROBE: CPT | Performed by: INTERNAL MEDICINE

## 2018-02-06 PROCEDURE — 87798 DETECT AGENT NOS DNA AMP: CPT | Performed by: INTERNAL MEDICINE

## 2018-02-06 PROCEDURE — 63710000001 INSULIN ASPART PER 5 UNITS: Performed by: HOSPITALIST

## 2018-02-06 PROCEDURE — 87340 HEPATITIS B SURFACE AG IA: CPT | Performed by: INTERNAL MEDICINE

## 2018-02-06 PROCEDURE — 83735 ASSAY OF MAGNESIUM: CPT | Performed by: INTERNAL MEDICINE

## 2018-02-06 PROCEDURE — 82962 GLUCOSE BLOOD TEST: CPT

## 2018-02-06 PROCEDURE — 86900 BLOOD TYPING SEROLOGIC ABO: CPT

## 2018-02-06 PROCEDURE — 85014 HEMATOCRIT: CPT | Performed by: INTERNAL MEDICINE

## 2018-02-06 PROCEDURE — 25010000002 IRON SUCROSE PER 1 MG: Performed by: INTERNAL MEDICINE

## 2018-02-06 PROCEDURE — 86901 BLOOD TYPING SEROLOGIC RH(D): CPT | Performed by: INTERNAL MEDICINE

## 2018-02-06 PROCEDURE — 84466 ASSAY OF TRANSFERRIN: CPT | Performed by: INTERNAL MEDICINE

## 2018-02-06 PROCEDURE — 94640 AIRWAY INHALATION TREATMENT: CPT

## 2018-02-06 PROCEDURE — 63510000001 EPOETIN ALFA PER 1000 UNITS: Performed by: INTERNAL MEDICINE

## 2018-02-06 PROCEDURE — 71045 X-RAY EXAM CHEST 1 VIEW: CPT

## 2018-02-06 PROCEDURE — 99223 1ST HOSP IP/OBS HIGH 75: CPT

## 2018-02-06 PROCEDURE — 85018 HEMOGLOBIN: CPT | Performed by: INTERNAL MEDICINE

## 2018-02-06 PROCEDURE — 80048 BASIC METABOLIC PNL TOTAL CA: CPT | Performed by: INTERNAL MEDICINE

## 2018-02-06 PROCEDURE — 84100 ASSAY OF PHOSPHORUS: CPT | Performed by: INTERNAL MEDICINE

## 2018-02-06 PROCEDURE — 36600 WITHDRAWAL OF ARTERIAL BLOOD: CPT

## 2018-02-06 PROCEDURE — 94660 CPAP INITIATION&MGMT: CPT

## 2018-02-06 PROCEDURE — 5A09357 ASSISTANCE WITH RESPIRATORY VENTILATION, LESS THAN 24 CONSECUTIVE HOURS, CONTINUOUS POSITIVE AIRWAY PRESSURE: ICD-10-PCS | Performed by: INTERNAL MEDICINE

## 2018-02-06 PROCEDURE — 87633 RESP VIRUS 12-25 TARGETS: CPT | Performed by: INTERNAL MEDICINE

## 2018-02-06 PROCEDURE — 85025 COMPLETE CBC W/AUTO DIFF WBC: CPT | Performed by: INTERNAL MEDICINE

## 2018-02-06 PROCEDURE — 86850 RBC ANTIBODY SCREEN: CPT | Performed by: INTERNAL MEDICINE

## 2018-02-06 PROCEDURE — 93970 EXTREMITY STUDY: CPT

## 2018-02-06 PROCEDURE — 06HM33Z INSERTION OF INFUSION DEVICE INTO RIGHT FEMORAL VEIN, PERCUTANEOUS APPROACH: ICD-10-PCS | Performed by: SURGERY

## 2018-02-06 PROCEDURE — P9016 RBC LEUKOCYTES REDUCED: HCPCS

## 2018-02-06 PROCEDURE — 83036 HEMOGLOBIN GLYCOSYLATED A1C: CPT | Performed by: HOSPITALIST

## 2018-02-06 PROCEDURE — 86901 BLOOD TYPING SEROLOGIC RH(D): CPT

## 2018-02-06 PROCEDURE — 63710000001 INSULIN DETEMER PER 5 UNITS: Performed by: INTERNAL MEDICINE

## 2018-02-06 PROCEDURE — 86900 BLOOD TYPING SEROLOGIC ABO: CPT | Performed by: INTERNAL MEDICINE

## 2018-02-06 RX ORDER — HEPARIN SODIUM 1000 [USP'U]/ML
3000 INJECTION, SOLUTION INTRAVENOUS; SUBCUTANEOUS ONCE
Status: DISCONTINUED | OUTPATIENT
Start: 2018-02-06 | End: 2018-02-13 | Stop reason: HOSPADM

## 2018-02-06 RX ORDER — IPRATROPIUM BROMIDE AND ALBUTEROL SULFATE 2.5; .5 MG/3ML; MG/3ML
3 SOLUTION RESPIRATORY (INHALATION)
Status: DISCONTINUED | OUTPATIENT
Start: 2018-02-06 | End: 2018-02-12

## 2018-02-06 RX ORDER — PANTOPRAZOLE SODIUM 40 MG/10ML
80 INJECTION, POWDER, LYOPHILIZED, FOR SOLUTION INTRAVENOUS ONCE
Status: COMPLETED | OUTPATIENT
Start: 2018-02-06 | End: 2018-02-06

## 2018-02-06 RX ORDER — BUDESONIDE 0.25 MG/2ML
0.25 INHALANT ORAL
Status: DISCONTINUED | OUTPATIENT
Start: 2018-02-06 | End: 2018-02-13 | Stop reason: HOSPADM

## 2018-02-06 RX ORDER — BUMETANIDE 0.25 MG/ML
2.5 INJECTION INTRAMUSCULAR; INTRAVENOUS ONCE
Status: COMPLETED | OUTPATIENT
Start: 2018-02-06 | End: 2018-02-06

## 2018-02-06 RX ORDER — IPRATROPIUM BROMIDE AND ALBUTEROL SULFATE 2.5; .5 MG/3ML; MG/3ML
SOLUTION RESPIRATORY (INHALATION)
Status: COMPLETED
Start: 2018-02-06 | End: 2018-02-06

## 2018-02-06 RX ORDER — ARFORMOTEROL TARTRATE 15 UG/2ML
15 SOLUTION RESPIRATORY (INHALATION)
Status: DISCONTINUED | OUTPATIENT
Start: 2018-02-06 | End: 2018-02-13 | Stop reason: HOSPADM

## 2018-02-06 RX ORDER — LANOLIN ALCOHOL/MO/W.PET/CERES
CREAM (GRAM) TOPICAL DAILY
Status: DISCONTINUED | OUTPATIENT
Start: 2018-02-06 | End: 2018-02-13 | Stop reason: HOSPADM

## 2018-02-06 RX ADMIN — IRON SUCROSE 200 MG: 20 INJECTION, SOLUTION INTRAVENOUS at 18:44

## 2018-02-06 RX ADMIN — BUPROPION HYDROCHLORIDE 200 MG: 100 TABLET, EXTENDED RELEASE ORAL at 09:42

## 2018-02-06 RX ADMIN — ARFORMOTEROL TARTRATE 15 MCG: 15 SOLUTION RESPIRATORY (INHALATION) at 21:13

## 2018-02-06 RX ADMIN — IPRATROPIUM BROMIDE AND ALBUTEROL SULFATE 3 ML: .5; 3 SOLUTION RESPIRATORY (INHALATION) at 16:22

## 2018-02-06 RX ADMIN — BUDESONIDE 0.25 MG: 0.25 INHALANT RESPIRATORY (INHALATION) at 12:32

## 2018-02-06 RX ADMIN — GABAPENTIN 300 MG: 300 CAPSULE ORAL at 09:41

## 2018-02-06 RX ADMIN — SODIUM CHLORIDE 8 MG/HR: 9 INJECTION, SOLUTION INTRAVENOUS at 09:41

## 2018-02-06 RX ADMIN — PANTOPRAZOLE SODIUM 80 MG: 40 INJECTION, POWDER, FOR SOLUTION INTRAVENOUS at 09:41

## 2018-02-06 RX ADMIN — IPRATROPIUM BROMIDE AND ALBUTEROL SULFATE 3 ML: .5; 3 SOLUTION RESPIRATORY (INHALATION) at 08:31

## 2018-02-06 RX ADMIN — FUROSEMIDE 40 MG: 40 TABLET ORAL at 09:42

## 2018-02-06 RX ADMIN — INSULIN ASPART 4 UNITS: 100 INJECTION, SOLUTION INTRAVENOUS; SUBCUTANEOUS at 09:43

## 2018-02-06 RX ADMIN — ERYTHROPOIETIN 20000 UNITS: 20000 INJECTION, SOLUTION INTRAVENOUS; SUBCUTANEOUS at 18:44

## 2018-02-06 RX ADMIN — INSULIN DETEMIR 25 UNITS: 100 INJECTION, SOLUTION SUBCUTANEOUS at 00:02

## 2018-02-06 RX ADMIN — SODIUM CHLORIDE 8 MG/HR: 9 INJECTION, SOLUTION INTRAVENOUS at 18:43

## 2018-02-06 RX ADMIN — ASPIRIN 81 MG: 81 TABLET, CHEWABLE ORAL at 09:42

## 2018-02-06 RX ADMIN — AMLODIPINE BESYLATE 10 MG: 10 TABLET ORAL at 09:42

## 2018-02-06 RX ADMIN — BUMETANIDE 2.5 MG: 0.25 INJECTION INTRAMUSCULAR; INTRAVENOUS at 18:44

## 2018-02-06 RX ADMIN — SODIUM BICARBONATE 650 MG: 650 TABLET ORAL at 09:42

## 2018-02-06 RX ADMIN — BUDESONIDE 0.25 MG: 0.25 INHALANT RESPIRATORY (INHALATION) at 21:13

## 2018-02-06 RX ADMIN — INSULIN ASPART 2 UNITS: 100 INJECTION, SOLUTION INTRAVENOUS; SUBCUTANEOUS at 13:22

## 2018-02-06 RX ADMIN — METOPROLOL TARTRATE 50 MG: 50 TABLET, FILM COATED ORAL at 09:42

## 2018-02-06 RX ADMIN — VITAMIN D, TAB 1000IU (100/BT) 1000 UNITS: 25 TAB at 09:43

## 2018-02-06 RX ADMIN — HYDRALAZINE HYDROCHLORIDE 25 MG: 25 TABLET, FILM COATED ORAL at 09:50

## 2018-02-06 RX ADMIN — CLONIDINE HYDROCHLORIDE 0.1 MG: 0.1 TABLET ORAL at 09:42

## 2018-02-06 RX ADMIN — ARFORMOTEROL TARTRATE 15 MCG: 15 SOLUTION RESPIRATORY (INHALATION) at 12:32

## 2018-02-06 RX ADMIN — CITALOPRAM 20 MG: 20 TABLET, FILM COATED ORAL at 09:42

## 2018-02-06 NOTE — PROGRESS NOTES
BLEV doppler completed.  Preliminary results:  Negative for DVT in bilateral lower extremities.  Results given to KATARINA Lynn.

## 2018-02-06 NOTE — SIGNIFICANT NOTE
02/06/18 1545   Rehab Treatment   Discipline physical therapist   Rehab Evaluation   Evaluation Not Performed other (see comments)  (pt's hgb at 6.8 this pm, also off floor to vascular; will follow up tomorrow.)   Recommendation   PT - Next Appointment 02/07/18

## 2018-02-06 NOTE — PROGRESS NOTES
"Adult Nutrition  Assessment/PES    Patient Name:  Kevin Mckeon V  YOB: 1950  MRN: [id number]  Admit Date:  2/5/2018    Assessment Date:  2/6/2018    Comments:  Assessment triggered by skin report.  Patient noted to have a stage II pressure ulcer to coccyx and an unstagable pressure ulcer to L heel per chart skin assessment.  Patient currently NPO.  Once diet is advanced, will add oral nutrition supplement to promote wound healing.    Also recommend addition of a MVI with minerals, vitamin C supplement (500 mg BID x 14 days) and zinc supplement to promote wound healing.    Will continue to follow.          Reason for Assessment       02/06/18 1407    Reason for Assessment    Reason For Assessment/Visit skin risk;identified at risk by screening criteria    Diagnosis Diagnosis    Cardiac HTN    Endocrine DM Type 2    Pulmonary/Critical Care COPD    Renal ARF;CKD              Nutrition/Diet History       02/06/18 1408    Nutrition/Diet History    Typical Food/Fluid Intake N/V x 1 month, early satiety, possible gastroparesis, abd pain, diarrhea per chart; pt currently NPO            Anthropometrics       02/06/18 1409    Anthropometrics    Height 182.9 cm (72.01\")    Weight 83.7 kg (184 lb 8.4 oz)    RD Documented Current Weight  83.7 kg (184 lb 8.4 oz)    Anthropometrics (Special Considerations)    RD Calculated BMI (kg/m2) 25.02    Ideal Body Weight (IBW)    Ideal Body Weight (IBW), Male (kg) 82.09    % Ideal Body Weight 102.17    Body Mass Index (BMI)    BMI (kg/m2) 25.07    BMI Grade 25 - 29.9 - overweight            Labs/Tests/Procedures/Meds       02/06/18 1409    Labs/Tests/Procedures/Meds    Diagnostic Test/Procedure Review reviewed, pertinent    Labs/Tests Review Reviewed;Glucose;Na+;Creat;BUN;GFR;Phos;Hgb A1C;Hgb Hct    Medication Review Reviewed, pertinent;Diuretic;Insulin   vit D3    Significant Vitals reviewed, pertinent            Physical Findings       02/06/18 1410    Physical " Findings/Assessment    Additional Documentation Physical Appearance (Group)    Physical Appearance    Gastrointestinal nausea;vomiting;abdominal tenderness;diarrhea   x 1 month per chart    Skin pressure ulcer(s)   L leg venous ulcer, R leg venous ulcer, st II coccyx, L heel unstagable, B=18              Nutrition Prescription Ordered       02/06/18 1411    Nutrition Prescription PO    Current PO Diet NPO              Problem/Interventions:        Problem 1       02/06/18 1411    Nutrition Diagnoses Problem 1    Problem 1 Increased Nutrient Needs    Macronutrient Kcal;Protein    Etiology (related to) Medical Diagnosis    Skin Pressure ulcer    Signs/Symptoms (evidenced by) Report/Observation                    Intervention Goal       02/06/18 1413    Intervention Goal    General Maintain nutrition;Disease management/therapy;Meet nutritional needs for age/condition    Weight Maintain weight            Nutrition Intervention       02/06/18 1413    Nutrition Intervention    RD/Tech Action Follow Tx progress;Care plan reviewd;Await begin PO              Education/Evaluation       02/06/18 1413    Education    Education Will Instruct as appropriate    Monitor/Evaluation    Monitor Per protocol;I&O;Pertinent labs;Weight;Skin status        Electronically signed by:  Marichuy Howell RD  02/06/18 2:14 PM

## 2018-02-06 NOTE — H&P
Chief complaint abnorma labs      History present illness :  Kevin Mckeon V this is a 67-year-old male with a past medical history of CKD stage 4 and diabetes who presented with a chief complaint of being found with abnormal labs.  Because his GFR reportedly called the er was found to be 15 was transferred here for possible need of dialysis in the near future.  The patient also complains of pain in the heel wound and also in the decubiti wound on his buttocks.  He denies shortness of breath chest pain nausea vomiting diarrhea or abdominal pain.  He has a very good appetite in fact he is hungry right now.  He states he barely can walk with a walker and he can barely stand up.  He denies tremors Weakness Headache  Weakness Fever Chills.  He has no other complaints.  \Creatinine was 3.57 on January 25 this year.    Review of Systems  Pertinent items are noted in HPI        Past Medical History:   Diagnosis Date   • COPD (chronic obstructive pulmonary disease)    • Diabetes mellitus    • Hypertension        Past Surgical History:   Procedure Laterality Date   • AMPUTATION FOOT / TOE  two years ago    5th toe on right foot   • ENDOSCOPY N/A 1/19/2018    Procedure: ESOPHAGOGASTRODUODENOSCOPY;  Surgeon: Danilo Hensley MD;  Location: [facility];  Service:    • HERNIA REPAIR     • LEG DEBRIDEMENT Bilateral 1/21/2018    Procedure: LOWER EXTREMITY DEBRIDEMENT 8:00;  Surgeon: Kaylyn Tillman DO;  Location: [facility] OR;  Service:        Objective      Vital Signs    Temp:  [97.5 °F (36.4 °C)] 97.5 °F (36.4 °C)  Heart Rate:  [74] 74  Resp:  [19] 19  BP: (149)/(85) 149/85    No intake or output data in the 24 hours ending 02/05/18 2000      Physical Exam:   Constitutional : well developed ,No acute distress  ENMT lips pink oral mucosa moist   Eyes sclerae white PERRL  Respiratory: Clear to auscultation , No crackles no wheezing respirations are even and un-labored  GI: Soft, Non tender, BS active in all 4 quadrants  obese  CVS: S1 S2 regular, no rub murmur or gallops  Skin warm dry no rashes no petechiae  Neuro grossly nonfocal cranial nerves 2-12 grossly intact sensation intact  Ext: 1 plus LE b/l edema, Peripheral pulses intact   Heme no cervical  lymphadenopathy no petechiae  Heel ulcer right LE below knee 4 cm ulcer with clean base  Decubitus ulcers    Results Review:   I reviewed the patient's new clinical results.    WBC No results found for: WBCS   HGB No results found for: HGB   HCT No results found for: HCT   Platlets No results found for: LABPLAT   MCV No results found for: MCV       Sodium  No results found for: NA   Potassium  No results found for: K   Chloride  No results found for: CL   CO2  No results found for: CO2   BUN  No results found for: BUN   Creatinine  No results found for: CREATININE   Calcium  No results found for: CALCIUM   PO4  No results found for: CAPO4   Albumin  No results found for: ALBUMIN   Magnesium  No results found for: MG   Uric Acid  No results found for: URICACID               Imaging Studies: I have personally reviewed the pertinent imaging studies     Medications        No current facility-administered medications for this encounter.       Assessment/Plan     Active Problems:    * No active hospital problems. *      CKD stage IV  Anemia of chronic disease  Lower Extremity edema  Diabetes mellitus  Multiple skin ulcers  Decubitus ulcers  Deconditioning  COPD  Hypertension      Check a basic metabolic panel in the morning of renal ultrasound and a PV  Physical therapy consult   wound care consult  Guaiac stools times 3 get a GI consult to rule out GI bleed  Repeat CBC transfusion hemoglobin  Transfuse if hb less than 7  Resume home meds  Gentle diuresis  Check an echo  Resume epogen monitor hb  Further management as indicated by clinical course          The above assessment and plan was discussed at length with the patient who voiced understanding and agrees to proceed with the plan as  outlined above.All the patient's questions were answered to his/her satisfaction.  Chart reviewed.  Best Fletcher MD  02/05/18  8:00 PM

## 2018-02-06 NOTE — PAYOR COMM NOTE
"Kevin Aguiar (67 y.o. Male)       PLEASE SEE ATTACHED CLINICAL REVIEW . PT. WAS ADMITTED TO [facility] ON 2/5/18.    PLEASE CALL   OR  [phone] WITH INPT AUTH AND DAYS APPROVED.     THANK YOU    MAITE STAFFORD, APPLE, CCP     Date of Birth Social Security Number Address Home Phone MRN    1950  [id number] [place] [address] [phone] [id number]    Denominational Marital Status          None Single       Admission Date Admission Type Admitting Provider Attending Provider Department, Room/Bed    2/5/18 Urgent Hank Joaquin MD [place], Hank Silveira MD [address], 636/1    Discharge Date Discharge Disposition Discharge Destination                      Attending Provider: Hank Joaquin MD     Allergies:  No Known Allergies    Isolation:  Contact   Infection:  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)   Code Status:  Prior    Ht:  182.9 cm (72\")   Wt:  83.7 kg (184 lb 8 oz)    Admission Cmt:  None   Principal Problem:  None                Active Insurance as of 2/5/2018     Primary Coverage     Payor Plan Insurance Group Employer/Plan Group    [employer] [place] MEDICARE REPLACEMENT [employer] [facility] REPL      Payor Plan Address Payor Plan Phone Number Effective From Effective To    [address] [address] [phone] 1/17/2018     [place] [address]       Subscriber Name Subscriber Birth Date Member ID       KEVIN AGUIAR 1950 [id number]                 Emergency Contacts      (Rel.) Home Phone Work Phone Mobile Phone    Dick Aguiar (Son) [phone] -- --    LindaDeisy wilson (Sister) [phone] -- --               History & Physical      Best Fletcher MD at 2/5/2018  8:17 PM          Chief complaint abnorma labs      History present illness :  Kevin Aguiar V this is a 67-year-old male with a past medical history of CKD stage 4 and diabetes who presented with a chief complaint of being found with abnormal labs.  Because his GFR reportedly called the er was found to be 15 was " transferred here for possible need of dialysis in the near future.  The patient also complains of pain in the heel wound and also in the decubiti wound on his buttocks.  He denies shortness of breath chest pain nausea vomiting diarrhea or abdominal pain.  He has a very good appetite in fact he is hungry right now.  He states he barely can walk with a walker and he can barely stand up.  He denies tremors Weakness Headache  Weakness Fever Chills.  He has no other complaints.  \Creatinine was 3.57 on January 25 this year.    Review of Systems  Pertinent items are noted in HPI        Past Medical History:   Diagnosis Date   • COPD (chronic obstructive pulmonary disease)    • Diabetes mellitus    • Hypertension        Past Surgical History:   Procedure Laterality Date   • AMPUTATION FOOT / TOE  two years ago    5th toe on right foot   • ENDOSCOPY N/A 1/19/2018    Procedure: ESOPHAGOGASTRODUODENOSCOPY;  Surgeon: Danilo Hensley MD;  Location: [facility];  Service:    • HERNIA REPAIR     • LEG DEBRIDEMENT Bilateral 1/21/2018    Procedure: LOWER EXTREMITY DEBRIDEMENT 8:00;  Surgeon: Kaylyn Tillman DO;  Location: [facility] OR;  Service:        Objective      Vital Signs    Temp:  [97.5 °F (36.4 °C)] 97.5 °F (36.4 °C)  Heart Rate:  [74] 74  Resp:  [19] 19  BP: (149)/(85) 149/85    No intake or output data in the 24 hours ending 02/05/18 2000      Physical Exam:   Constitutional : well developed ,No acute distress  ENMT lips pink oral mucosa moist   Eyes sclerae white PERRL  Respiratory: Clear to auscultation , No crackles no wheezing respirations are even and un-labored  GI: Soft, Non tender, BS active in all 4 quadrants obese  CVS: S1 S2 regular, no rub murmur or gallops  Skin warm dry no rashes no petechiae  Neuro grossly nonfocal cranial nerves 2-12 grossly intact sensation intact  Ext: 1 plus LE b/l edema, Peripheral pulses intact   Heme no cervical  lymphadenopathy no petechiae  Heel ulcer right LE below knee  4 cm ulcer with clean base  Decubitus ulcers    Results Review:   I reviewed the patient's new clinical results.    WBC No results found for: WBCS   HGB No results found for: HGB   HCT No results found for: HCT   Platlets No results found for: LABPLAT   MCV No results found for: MCV       Sodium  No results found for: NA   Potassium  No results found for: K   Chloride  No results found for: CL   CO2  No results found for: CO2   BUN  No results found for: BUN   Creatinine  No results found for: CREATININE   Calcium  No results found for: CALCIUM   PO4  No results found for: CAPO4   Albumin  No results found for: ALBUMIN   Magnesium  No results found for: MG   Uric Acid  No results found for: URICACID               Imaging Studies: I have personally reviewed the pertinent imaging studies     Medications        No current facility-administered medications for this encounter.       Assessment/Plan     Active Problems:    * No active hospital problems. *      CKD stage IV  Anemia of chronic disease  Lower Extremity edema  Diabetes mellitus  Multiple skin ulcers  Decubitus ulcers  Deconditioning  COPD  Hypertension      Check a basic metabolic panel in the morning of renal ultrasound and a PV  Physical therapy consult   wound care consult  Guaiac stools times 3 get a GI consult to rule out GI bleed  Repeat CBC transfusion hemoglobin  Transfuse if hb less than 7  Resume home meds  Gentle diuresis  Resume epogen monitor hb  Further management as indicated by clinical course          The above assessment and plan was discussed at length with the patient who voiced understanding and agrees to proceed with the plan as outlined above.All the patient's questions were answered to his/her satisfaction.  Chart reviewed.  Best Fletcher MD  02/05/18  8:00 PM       Electronically signed by Best Fletcher MD at 2/5/2018  8:20 PM        Intake & Output (last day)       02/05 0701 - 02/06 0700 02/06 0701 - 02/07 0700    P.O. 120 0     Total Intake 120 0    Urine 850     Stool 0     Total Output 850      Net -730 0          Unmeasured Urine Occurrence 10 x     Unmeasured Stool Occurrence 1 x         Blood Administration Record     None        Lab Results (all)     Procedure Component Value Units Date/Time    POC Glucose Once [[id number]]  (Abnormal) Collected:  02/05/18 2038    Specimen:  Blood Updated:  02/05/18 2040     Glucose 238 (H) mg/dL     Narrative:       Meter: [id number] : [id number] Joel VAZQUEZ    POC Glucose Once [[id number]]  (Abnormal) Collected:  02/05/18 2357    Specimen:  Blood Updated:  02/06/18 0011     Glucose 254 (H) mg/dL     Narrative:       Meter: [id number] : [id number] Joel Mckeon TAYLOR    CBC & Differential [[id number]] Collected:  02/06/18 0440    Specimen:  Blood Updated:  02/06/18 0609    Narrative:       CBC Auto Differential [[id number]]  (Abnormal) Collected:  02/06/18 0440    Specimen:  Blood Updated:  02/06/18 0609     WBC 7.48 10*3/mm3      RBC 2.54 (L) 10*6/mm3      Hemoglobin 6.9 (C) g/dL      Hematocrit 23.1 (L) %      MCV 90.9 fL      MCH 27.2 pg      MCHC 29.9 (L) g/dL      RDW 15.1 (H) %      RDW-SD 49.5 fl      MPV 11.1 fL      Platelets 266 10*3/mm3      Neutrophil % 76.3 (H) %      Lymphocyte % 14.2 (L) %      Monocyte % 6.0 %      Eosinophil % 1.5 %      Basophil % 0.7 %      Immature Grans % 1.3 (H) %      Neutrophils, Absolute 5.71 10*3/mm3      Lymphocytes, Absolute 1.06 10*3/mm3      Monocytes, Absolute 0.45 10*3/mm3      Eosinophils, Absolute 0.11 10*3/mm3      Basophils, Absolute 0.05 10*3/mm3      Immature Grans, Absolute 0.10 (H) 10*3/mm3      nRBC 0.0 /100 WBC     Urinalysis - Urine, Clean Catch [[id number]]  (Abnormal) Collected:  02/06/18 0600    Specimen:  Urine from Urine, Clean Catch Updated:  02/06/18 0613     Color, UA Yellow     Appearance, UA Clear     pH, UA 5.5     Specific Gravity, UA 1.013     Glucose,  mg/dL (2+) (A)     Ketones, UA Negative     Bilirubin, UA  Negative     Blood, UA Negative     Protein,  mg/dL (2+) (A)     Leuk Esterase, UA Negative     Nitrite, UA Negative     Urobilinogen, UA 0.2 E.U./dL    Iron Profile [[id number]]  (Abnormal) Collected:  02/06/18 0440    Specimen:  Blood Updated:  02/06/18 0615     Iron 14 (L) mcg/dL      Iron Saturation 7 (L) %      Transferrin 126 (L) mg/dL      TIBC 188 mcg/dL     Magnesium [[id number]]  (Normal) Collected:  02/06/18 0440    Specimen:  Blood Updated:  02/06/18 0615     Magnesium 2.1 mg/dL     Basic Metabolic Panel [[id number]]  (Abnormal) Collected:  02/06/18 0440    Specimen:  Blood Updated:  02/06/18 0620     Glucose 258 (H) mg/dL      BUN 63 (H) mg/dL      Creatinine 3.85 (H) mg/dL      Sodium 135 (L) mmol/L      Potassium 4.9 mmol/L      Chloride 100 mmol/L      CO2 22.6 mmol/L      Calcium 8.3 (L) mg/dL      eGFR Non African Amer 16 (L) mL/min/1.73      BUN/Creatinine Ratio 16.4     Anion Gap 12.4 mmol/L     Narrative:       Phosphorus [[id number]]  (Abnormal) Collected:  02/06/18 0440    Specimen:  Blood Updated:  02/06/18 0620     Phosphorus 6.7 (H) mg/dL     Hemoglobin A1c [[id number]]  (Abnormal) Collected:  02/06/18 0440    Specimen:  Blood Updated:  02/06/18 0710     Hemoglobin A1C 10.60 (H) %     Narrative:       POC Glucose Once [[id number]]  (Abnormal) Collected:  02/06/18 0714    Specimen:  Blood Updated:  02/06/18 0728     Glucose 203 (H) mg/dL     Narrative:       Meter: [id number] : [id number] Umm Billings    Blood Gas, Arterial [[id number]]  (Abnormal) Collected:  02/06/18 0830    Specimen:  Arterial Blood Updated:  02/06/18 0840     Site Arterial: right radial     Loi's Test Positive     pH, Arterial 7.299 (C) pH units       Critical:Notify KATARINA nunez (06-Feb-18 08:39:50)Read back ok        pCO2, Arterial 52.7 (H) mm Hg      pO2, Arterial 67.4 (L) mm Hg      HCO3, Arterial 25.9 mmol/L      Base Excess, Arterial -0.6 (L) mmol/L      O2 Saturation Calculated 90.6 (L) %      Barometric  Pressure for Blood Gas 755.3 mmHg      Modality Cannula     Flow Rate 3 lpm      Rate 16 Breaths/minute     Narrative:       93% Meter: [id number] : [id number] Harvey          Imaging Results (all)     Procedure Component Value Units Date/Time    US Renal Bilateral [[id number]] Collected:  02/05/18 2255     Updated:  02/05/18 2255    Narrative:       BILATERAL RENAL ULTRASOUND     CLINICAL HISTORY: lesa     FINDINGS: Longitudinal and transverse images of the kidneys were  obtained. The right kidney measures 11.5 cm in length. The left kidney  measures 13.0 cm in length. There is cortical thinning bilaterally  compatible with atrophy. There is no cystic or solid renal mass. There  is no hydronephrosis.     Bilateral pleural effusions are incidentally noted.                   Impression:       1.  Cortical atrophy, no mass or hydronephrosis.                Orders (all)     Start     Ordered    02/06/18 1400  Hemoglobin & Hematocrit, Blood  Every 8 Hours      02/06/18 0749    02/06/18 0905  . BIPAP; IPAP: 12; EPAP: 5; Titrate for SPO2: 88% - 92%  Until Discontinued      02/06/18 0905    02/06/18 0905  Inpatient Consult to Pulmonology  Once     Specialty:  Pulmonary Disease  Provider:  Hank Shetty MD    02/06/18 0905    02/06/18 0756  Blood Gas, Arterial  STAT      02/06/18 0755    02/06/18 0755  NPO Diet  Diet Effective Now      02/06/18 0754    02/06/18 0750  Inpatient Consult to Gastroenterology  Once     Specialty:  Gastroenterology  Provider:  Dnailo Hensley MD    02/06/18 0750    02/06/18 0747  Verify Informed Consent for Blood Product Administration  Once      02/06/18 0746    02/06/18 0747  Type & Screen  Once      02/06/18 0746    02/06/18 0747  Prepare RBC, 1 Units  Blood - Once      02/06/18 0746    02/06/18 0738  Duplex Venous Lower Extremity - Bilateral CAR  Once      02/06/18 0737    02/06/18 0700  POC Glucose 4x Daily AC & at Bedtime  4 Times Daily Before Meals & at  Bedtime      02/05/18 2323 02/05/18 2324  Do NOT Hold Basal Insulin When Patient is NPO, Hold Bolus Dose if NPO  Continuous      02/05/18 2323 02/05/18 2324  Follow [facility] Hypoglycemia Protocol For Blood Glucose Less Than 70 mg/dL  Until Discontinued      02/05/18 2323 02/05/18 2324  Hypoglycemia Treatment - Alert Patient That is Not NPO and Can Safely Swallow  Until Discontinued     Comments:  Administer 4 oz Fruit Juice OR 4 oz Regular Soda OR 8 oz Milk OR 15-30 grams (1 tube) of Glucose Gel.  Recheck Blood Glucose 15 Minutes After Ingestion, Repeat Treatment & Continue to Recheck Blood Sugar Every 15 Minutes Until Blood Glucose is 70 mg/dL or Higher.  Once Blood Glucose is 70 mg/dL or Higher and if It Will Be more than 60 Minutes Until the Next Meal, Provide Appropriate Snack (Including Carbohydrate Food) Based on Meal Plan Order. Give Meal Tray As Soon As Possible.    02/05/18 2323 02/05/18 2324  Hypoglycemia Treatment - Patient Has IV Access - Unresponsive, NPO or Unable To Safely Swallow  Until Discontinued     Comments:  Administer 25g (50ml) D50W IV Push.  Recheck Blood Glucose 15 Minutes After Administration, if Blood Glucose Remains Less Than 70 mg/dl, Repeat Treatment   Recheck Blood Glucose 15 Minutes After 2nd Administration, if Blood Glucose Remains Less Than 70 mg/dL After 2nd Dose of D50W, Contact Provider for Further Treatment Orders & Consider Adding IVF With D5W for Maintenance    02/05/18 2323 02/05/18 2324  Hypoglycemia Treatment - Patient Without IV Access - Unresponsive, NPO or Unable To Safely Swallow  Until Discontinued     Comments:  Administer 1mg Glucagon SQ & Establish IV Access.  Turn Patient on Side - Nausea / Vomiting May Occur.  Recheck Blood Glucose 15 Minutes After Administration.  If Blood Glucose Remains Less Than 70, Administer 25g D50W IV Push (50ml).  Recheck Blood Glucose 15 Minutes After Administration of D50W, if Blood Glucose Remains Less Than 70 mg/dl, Contact  Provider for Further Treatment Orders & Consider Adding IVF With D5 for Maintenance    18 2323    18  Notify Provider of event. Communicate needs and document in EMR.  Once      18 2323    02/05/18 2323  Place Sequential Compression Device  Once      18  POC Glucose Once  Once      18  Urinalysis - Urine, Clean Catch  Once      18  US Renal Bilateral  1 Time Imaging      18  Wound Ostomy Eval & Treat  Once      18  Inpatient Consult to Infectious Diseases  Once     Specialty:  Infectious Diseases  Provider:  Bettie Gutierrez MD    18  PT Consult: Eval & Treat  Once      18    Unscheduled  Transfuse RBC, 1 Units Infuse Each Unit Over: 4H  Transfusion      18 0746             Physician Progress Notes (all)      Danilo Del Valle MD at 2018 11:30 PM  Version 1 of 1         Came by to see Mr Mckeon but unable to see him presently due to maintenance issues.  The ENTIRE hallway is closed to was the floor?  Discussed him with his nurse and SSI insulin added to his basal Levemir.  A1c is ordered.  Will come back by later if the hallway is reopened otherwise he will be seen in the AM.  Danilo Del Valle MD  11:32 PM       Electronically signed by Danilo Del Valle MD at 2018 11:32 PM           Consult Notes (all)      He Strange MD at 2018  7:36 AM  Version 2 of 2           Patient Identification:  Name: Kevin Mckeon V  Age/Sex: 67 y.o. male  :  1950  MRN: [id number]         Primary Care Physician: Alicia Harley MD  Room:  [facility]/              ConsultsDate of Admit: 2018  Date of Consult: 18  Subjective   History of Present Illness  68 y/o with a h/o CKD4/5, DM2 with possible gastroparesis, HTN, COPD who comes in for abnormal labs per [facility]. We are consulted for  management of his DM and other medical problems. He also complains of N/V for the past month. He has early satiety and has possible gastroparesis. He states he has also had some upper abdominal pain and diarrhea. It appears he was admitted in mid January with MRSA cellulitis and found to have duodenal erosions and ulcerative esophagitis.     Past Medical History:   Diagnosis Date   • COPD (chronic obstructive pulmonary disease)    • Diabetes mellitus    • Hypertension      Past Surgical History:   Procedure Laterality Date   • AMPUTATION FOOT / TOE  two years ago    5th toe on right foot   • ENDOSCOPY N/A 1/19/2018    Procedure: ESOPHAGOGASTRODUODENOSCOPY;  Surgeon: Danilo Hensley MD;  Location: [facility] OR;  Service:    • HERNIA REPAIR     • LEG DEBRIDEMENT Bilateral 1/21/2018    Procedure: LOWER EXTREMITY DEBRIDEMENT 8:00;  Surgeon: Kaylyn Tillman DO;  Location: [facility] OR;  Service:      Family History   Problem Relation Age of Onset   • Heart disease Father      Social History   Substance Use Topics   • Smoking status: Former Smoker     Packs/day: 3.00     Years: 37.00     Types: Cigarettes     Quit date: 2005   • Smokeless tobacco: Current User     Types: Chew   • Alcohol use 1.2 oz/week     2 Glasses of wine per week      Comment: rarely     Prescriptions Prior to Admission   Medication Sig Dispense Refill Last Dose   • acetaminophen (TYLENOL) 325 MG tablet Take 650 mg by mouth Every 6 (Six) Hours As Needed for Mild Pain .   2/5/2018 at Unknown time   • amLODIPine (NORVASC) 10 MG tablet Take 1 tablet by mouth Daily.   2/5/2018 at Unknown time   • aspirin 81 MG EC tablet Take 81 mg by mouth Daily.   2/5/2018 at Unknown time   • buPROPion SR (WELLBUTRIN SR) 200 MG 12 hr tablet Take 1 tablet by mouth Daily.   2/5/2018 at Unknown time   • Cholecalciferol 1000 units capsule Take 1,000 Units by mouth Daily.   2/5/2018 at Unknown time   • citalopram (CeleXA) 20 MG tablet Take 20 mg by mouth Daily.    2018 at Unknown time   • CloNIDine (CATAPRES) 0.1 MG tablet Take 0.1 mg by mouth 2 (Two) Times a Day.   2018 at Unknown time   • collagenase 250 UNIT/GM ointment Apply  topically Daily.   2018 at Unknown time   • epoetin chloe (EPOGEN,PROCRIT) 37229 UNIT/ML injection Inject 1 mL under the skin 1 (One) Time Per Week. Indications: Anemia associated with Chronic Kidney Failure   2018 at Unknown time   • gabapentin (NEURONTIN) 300 MG capsule Take 1 capsule by mouth Every 12 (Twelve) Hours.   2018 at Unknown time   • hydrALAZINE (APRESOLINE) 25 MG tablet Take 3 tablets by mouth Every 8 (Eight) Hours.   2018 at Unknown time   • HYDROcodone-acetaminophen (NORCO) 5-325 MG per tablet Take 1 tablet by mouth Every 4 (Four) Hours As Needed for Moderate Pain . 12 tablet 0 2018 at Unknown time   • hydrophor (AQUAPHOR) ointment ointment Apply  topically As Needed (Dry Skin).   2018 at Unknown time   • insulin detemir (LEVEMIR) 100 UNIT/ML injection Inject 25 Units under the skin Every Night.  12 2018 at Unknown time   • [] lactobacillus acidophilus (RISAQUAD) capsule capsule Take 1 capsule by mouth Daily for 10 days. 10 capsule 0 2018 at Unknown time   • metoprolol tartrate (LOPRESSOR) 50 MG tablet Take 1 tablet by mouth Every 12 (Twelve) Hours.   2018 at Unknown time   • pantoprazole (PROTONIX) 40 MG EC tablet Take 1 tablet by mouth 2 (Two) Times a Day Before Meals.   2018 at Unknown time   • risperiDONE (risperDAL) 2 MG tablet Take 2 mg by mouth Daily.   2018 at Unknown time   • sodium bicarbonate 650 MG tablet Take 1 tablet by mouth 3 (Three) Times a Day.   2018 at Unknown time   • tamsulosin (FLOMAX) 0.4 MG capsule 24 hr capsule Take 1 capsule by mouth Every Night. 30 capsule  2018 at Unknown time   • traZODone (DESYREL) 100 MG tablet Take 1 tablet by mouth Every Night.   2018 at Unknown time     Allergies:  Review of patient's allergies indicates no known  allergies.    Review of Systems   Constitutional: Positive for fatigue. Negative for chills and fever.   HENT: Negative.    Eyes: Negative.    Respiratory: Negative.    Cardiovascular: Positive for leg swelling. Negative for chest pain and palpitations.   Gastrointestinal: Positive for abdominal pain, diarrhea, nausea and vomiting.   Endocrine: Negative.    Genitourinary: Negative.    Musculoskeletal: Negative.    Skin: Negative.    Neurological: Negative.    Hematological: Negative.    Psychiatric/Behavioral: Negative.        Objective      Vital Signs  Temp:  [97.5 °F (36.4 °C)-97.9 °F (36.6 °C)] 97.8 °F (36.6 °C)  Heart Rate:  [71-82] 71  Resp:  [16-20] 16  BP: (144-158)/(77-90) 144/77  There is no height or weight on file to calculate BMI.    Physical Exam   Constitutional:   Looks disheveled and unkempt, chronically-ill   HENT:   Head: Normocephalic and atraumatic.   Mouth/Throat: No oropharyngeal exudate.   Eyes: Conjunctivae are normal. No scleral icterus.   Neck: Normal range of motion. No JVD present. No tracheal deviation present.   Cardiovascular: Normal rate and regular rhythm.    No murmur heard.  Pulmonary/Chest: Effort normal.   Decreased breath sounds b/l   Abdominal: Soft. Bowel sounds are normal. He exhibits no distension. There is no tenderness.   Musculoskeletal: He exhibits edema. He exhibits no deformity.   Neurological:   Lethargic, oriented x2   Skin: Skin is warm and dry.   Psychiatric:   Lethargic but appropriate       Results Review:    I reviewed the patient's new clinical results.    1. Iron deficiency anemia  - appears he had Upper endoscopy during admission to [place] in 01/2018 but I cannot pull up report  - will transfuse 1u pRBCs  - monitor H/H q8h. This could be combination of ABLA from previous duodenal erosions plus his CKD  - GI consult  - I will place on protonix drip until we figure out where blood loss is coming form    2. LE Swelling  - likely due to hypoalbuminemia from  proteinuria  - on lasix, defer to renal  - will check LE venous duplex    3. DM2  - on levemir and SSI  - NPO until GI sees him    4. Acute hypercapneic and hypoxic resp failure  - ABG shows resp acidosis  - place on BiPAP  - Pulm consult  - CXR    5. HTN  - home meds re-ordered, defer to renal for any changes    6. SOHAM on CKD4 vs progression of CKD  - renal managing    Thank you for this consult, we will continue to follow along with you.      Electronically signed by He Strange MD at 2/6/2018  9:06 AM        All medication doses during the admission are shown, including meds that are no longer on order.     Scheduled Meds Sorted by Name for Kevin Mckeon as of 2/4/18 through 2/6/18       1 Day 3 Days 7 Days 10 Days < Today >    Legend:                          Inactive     Active     Other Encounter    Linked               Medications 02/04/18 02/05/18 02/06/18   amLODIPine (NORVASC) tablet 10 mg  Dose: 10 mg Freq: Every 24 Hours Scheduled Route: PO  Start: 02/06/18 0900    Admin Instructions:   Avoid grapefruit juice.      0900              aspirin chewable tablet 81 mg  Dose: 81 mg Freq: Daily Route: PO  Start: 02/06/18 0900    Admin Instructions:   Herbal/drug interaction: Avoid use with ginkgo biloba.  Do not exceed 4 grams of aspirin in a 24 hr period.    If given for pain, use the following pain scale:   Mild Pain = Pain Score of 1-3, CPOT 1-2  Moderate Pain = Pain Score of 4-6, CPOT 3-4  Severe Pain = Pain Score of 7-10, CPOT 5-8      0900              buPROPion SR (WELLBUTRIN SR) 12 hr tablet 200 mg  Dose: 200 mg Freq: Every 12 Hours Scheduled Route: PO  Start: 02/05/18 2245    Admin Instructions:   Swallow whole. Do not crush, chew, or split tablet.     2358 0900     2100            cholecalciferol (VITAMIN D3) tablet 1,000 Units  Dose: 1,000 Units Freq: Daily Route: PO  Start: 02/06/18 0900      0900              citalopram (CeleXA) tablet 20 mg  Dose: 20 mg Freq: Daily Route:  PO  Start: 02/06/18 0900    Admin Instructions:   Caution: Look alike/sound alike drug alert.      0900              CloNIDine (CATAPRES) tablet 0.1 mg  Dose: 0.1 mg Freq: Every 12 Hours Scheduled Route: PO  Start: 02/05/18 2245    Admin Instructions:   Caution: Look alike/sound alike drug alert. Please read the label.     2357 0900 2100            furosemide (LASIX) tablet 40 mg  Dose: 40 mg Freq: 3 Times Daily Route: PO  Start: 02/05/18 2100 2357 0900     1600     2100          gabapentin (NEURONTIN) capsule 300 mg  Dose: 300 mg Freq: Every 12 Hours Scheduled Route: PO  Start: 02/05/18 2245 2356 0900 2100            hydrALAZINE (APRESOLINE) tablet 25 mg  Dose: 25 mg Freq: Every 8 Hours Scheduled Route: PO  Start: 02/05/18 2245 2356            0600     1400     2200          insulin aspart (novoLOG) injection 0-9 Units  Dose: 0-9 Units Freq: 4 Times Daily With Meals & Nightly Route: SC  Start: 02/06/18 0800    Admin Instructions:   Correction - Moderate Dose.  40-60 units/day total insulin dose or average weight, on oral agents    Blood glucose 150-199 mg/dL - 2 units  Blood glucose 200-249 mg/dL - 4 units  Blood glucose 250-299 mg/dL - 6 units  Blood glucose 300-349 mg/dL - 7 units  Blood glucose 350-400 mg/dL - 8 units  Blood glucose greater than 400 mg/dL - 9 units and call provider          0800     1200     1800       2100              insulin detemir (LEVEMIR) injection 25 Units  Dose: 25 Units Freq: Nightly Route: SC  Start: 02/05/18 2245 0002     2100            ipratropium-albuterol (DUO-NEB) nebulizer solution 3 mL  Dose: 3 mL Freq: Every 6 Hours While Awake - RT Route: NEBULIZATION  Start: 02/06/18 0830      0831     1300     1900          metoprolol tartrate (LOPRESSOR) tablet 50 mg  Dose: 50 mg Freq: Every 12 Hours Scheduled Route: PO  Start: 02/05/18 2245 2356            0900     2100            pantoprazole (PROTONIX) EC tablet 40  mg  Dose: 40 mg Freq: 2 Times Daily Before Meals Route: PO  Start: 02/06/18 0730 End: 02/06/18 0754    Admin Instructions:   Swallow whole; do not crush, split, or chew.      0730     0754-D/C'd        pantoprazole (PROTONIX) injection 80 mg  Dose: 80 mg Freq: Once Route: IV  Indications of Use: Gastrointestinal (GI) Bleed  Start: 02/06/18 0830    Admin Instructions:   Give IV push over 2-5 minutes      0830              risperiDONE (risperDAL) tablet 2 mg  Dose: 2 mg Freq: Nightly Route: PO  Start: 02/05/18 2245 2358 2100              sodium bicarbonate tablet 650 mg  Dose: 650 mg Freq: 3 Times Daily Route: PO  Start: 02/05/18 2245 2357            0900     1600     2100          tamsulosin (FLOMAX) 24 hr capsule 0.4 mg  Dose: 0.4 mg Freq: Nightly Route: PO  Start: 02/05/18 2245    Admin Instructions:   Swallow whole. Do not crush or chew.     2357 2100              traZODone (DESYREL) tablet 100 mg  Dose: 100 mg Freq: Nightly Route: PO  Start: 02/05/18 2245    Admin Instructions:   Take with food.     2356 2100              Medications 02/04/18 02/05/18 02/06/18         Continuous Meds Sorted by Name for Kevin Mckeon as of 2/4/18 through 2/6/18      Legend:                          Inactive     Active     Other Encounter    Linked               Medications 02/04/18 02/05/18 02/06/18   pantoprazole (PROTONIX) 40 mg/100 mL (0.4 mg/mL) in 0.9% NS IVPB  Rate: 20 mL/hr Freq: Continuous Route: IV  Indications of Use: Gastrointestinal (GI) Bleed  Start: 02/06/18 0830      0830                    PRN Meds Sorted by Name for Kevin Mckeon as of 2/4/18 through 2/6/18      Legend:                          Inactive     Active     Other Encounter    Linked               Medications 02/04/18 02/05/18 02/06/18   acetaminophen (TYLENOL) tablet 650 mg  Dose: 650 mg Freq: Every 6 Hours PRN Route: PO  PRN Reason: Mild Pain   Start: 02/05/18 2154    Admin Instructions:   Do not exceed 4  grams of acetaminophen in a 24 hr period.    If given for pain, use the following pain scale:   Mild Pain = Pain Score of 1-3, CPOT 1-2  Moderate Pain = Pain Score of 4-6, CPOT 3-4  Severe Pain = Pain Score of 7-10, CPOT 5-8         dextrose (D50W) solution 25 g  Dose: 25 g Freq: Every 15 Minutes PRN Route: IV  PRN Reason: Low Blood Sugar  PRN Comment: Blood Sugar Less Than 70, Patient Has IV Access - Unresponsive, NPO or Unable To Safely Swallow  Start: 02/05/18 2323         dextrose (GLUTOSE) oral gel 15 g  Dose: 15 g Freq: Every 15 Minutes PRN Route: PO  PRN Reason: Low Blood Sugar  PRN Comment: Blood Sugar Less Than 70, Patient Alert, Is Not NPO & Can Safely Swallow  Start: 02/05/18 2323         glucagon (human recombinant) (GLUCAGEN DIAGNOSTIC) injection 1 mg  Dose: 1 mg Freq: As Needed Route: SC  PRN Comment: Blood Glucose Less Than 70 - Patient Without IV Access - Unresponsive, NPO or Unable To Safely Swallow  Start: 02/05/18 2323         HYDROcodone-acetaminophen (NORCO) 5-325 MG per tablet 1 tablet  Dose: 1 tablet Freq: Every 4 Hours PRN Route: PO  PRN Reason: Moderate Pain   Start: 02/05/18 2159 End: 02/15/18 2158    Admin Instructions:   Do not exceed 4 grams of acetaminophen in a 24 hr period.    If given for pain, use the following pain scale:   Mild Pain = Pain Score of 1-3, CPOT 1-2  Moderate Pain = Pain Score of 4-6, CPOT 3-4  Severe Pain = Pain Score of 7-10, CPOT 5-8     2356               Medications 02/04/18 02/05/18 02/06/18

## 2018-02-06 NOTE — CONSULTS
Patient Care Team:  Alicia Harley MD as PCP - General (Family Medicine)    CHIEF COMPLAINT: Respiratory failure    HISTORY OF PRESENT ILLNESS:    68 yo Schizophrenic adm with volume overload/respiratory failure and Renal failure. On BIPAP so NPO but with complaints of diarreha. Also untreated diabetes and multiple LE ulcers/wounds. Wsa just hospitalized in [place] and received ABX for his LE Ulcers and underwent dibridement. Also had EGED and found ulcerative esophagitis and retained gastric contents.   As per H&P......Kevin Mckeon V this is a 67-year-old male with a past medical history of CKD stage 4 and diabetes who presented with a chief complaint of being found with abnormal labs.  Because his GFR reportedly called the er was found to be 15 was transferred here for possible need of dialysis in the near future.  The patient also complains of pain in the heel wound and also in the decubiti wound on his buttocks.  He denies shortness of breath chest pain nausea vomiting diarrhea or abdominal pain.  He has a very good appetite in fact he is hungry right now.  He states he barely can walk with a walker and he can barely stand up.  He denies tremors Weakness Headache  Weakness Fever Chills.  He has no other complaints.  \Creatinine was 3.57 on January 25 this year.    Past Medical History:   Diagnosis Date   • COPD (chronic obstructive pulmonary disease)    • Diabetes mellitus    • Hypertension      Past Surgical History:   Procedure Laterality Date   • AMPUTATION FOOT / TOE  two years ago    5th toe on right foot   • ENDOSCOPY N/A 1/19/2018    Procedure: ESOPHAGOGASTRODUODENOSCOPY;  Surgeon: Danilo Hensley MD;  Location: [facility];  Service:    • HERNIA REPAIR     • LEG DEBRIDEMENT Bilateral 1/21/2018    Procedure: LOWER EXTREMITY DEBRIDEMENT 8:00;  Surgeon: Kaylyn Tillman DO;  Location: [facility];  Service:      Family History   Problem Relation Age of Onset   • Heart disease Father       Social History   Substance Use Topics   • Smoking status: Former Smoker     Packs/day: 3.00     Years: 37.00     Types: Cigarettes     Quit date: 2005   • Smokeless tobacco: Current User     Types: Chew   • Alcohol use 1.2 oz/week     2 Glasses of wine per week      Comment: rarely     Prescriptions Prior to Admission   Medication Sig Dispense Refill Last Dose   • acetaminophen (TYLENOL) 325 MG tablet Take 650 mg by mouth Every 6 (Six) Hours As Needed for Mild Pain .   2/5/2018 at Unknown time   • amLODIPine (NORVASC) 10 MG tablet Take 1 tablet by mouth Daily.   2/5/2018 at Unknown time   • aspirin 81 MG EC tablet Take 81 mg by mouth Daily.   2/5/2018 at Unknown time   • buPROPion SR (WELLBUTRIN SR) 200 MG 12 hr tablet Take 1 tablet by mouth Daily.   2/5/2018 at Unknown time   • Cholecalciferol 1000 units capsule Take 1,000 Units by mouth Daily.   2/5/2018 at Unknown time   • citalopram (CeleXA) 20 MG tablet Take 20 mg by mouth Daily.   2/5/2018 at Unknown time   • CloNIDine (CATAPRES) 0.1 MG tablet Take 0.1 mg by mouth 2 (Two) Times a Day.   2/5/2018 at Unknown time   • collagenase 250 UNIT/GM ointment Apply  topically Daily.   2/5/2018 at Unknown time   • epoetin chloe (EPOGEN,PROCRIT) 59271 UNIT/ML injection Inject 1 mL under the skin 1 (One) Time Per Week. Indications: Anemia associated with Chronic Kidney Failure   2/5/2018 at Unknown time   • gabapentin (NEURONTIN) 300 MG capsule Take 1 capsule by mouth Every 12 (Twelve) Hours.   2/5/2018 at Unknown time   • hydrALAZINE (APRESOLINE) 25 MG tablet Take 3 tablets by mouth Every 8 (Eight) Hours.   2/5/2018 at Unknown time   • HYDROcodone-acetaminophen (NORCO) 5-325 MG per tablet Take 1 tablet by mouth Every 4 (Four) Hours As Needed for Moderate Pain . 12 tablet 0 2/5/2018 at Unknown time   • hydrophor (AQUAPHOR) ointment ointment Apply  topically As Needed (Dry Skin).   2/5/2018 at Unknown time   • insulin detemir (LEVEMIR) 100 UNIT/ML injection Inject 25 Units  "under the skin Every Night.  12 2018 at Unknown time   • [] lactobacillus acidophilus (RISAQUAD) capsule capsule Take 1 capsule by mouth Daily for 10 days. 10 capsule 0 2018 at Unknown time   • metoprolol tartrate (LOPRESSOR) 50 MG tablet Take 1 tablet by mouth Every 12 (Twelve) Hours.   2018 at Unknown time   • pantoprazole (PROTONIX) 40 MG EC tablet Take 1 tablet by mouth 2 (Two) Times a Day Before Meals.   2018 at Unknown time   • risperiDONE (risperDAL) 2 MG tablet Take 2 mg by mouth Daily.   2018 at Unknown time   • sodium bicarbonate 650 MG tablet Take 1 tablet by mouth 3 (Three) Times a Day.   2018 at Unknown time   • tamsulosin (FLOMAX) 0.4 MG capsule 24 hr capsule Take 1 capsule by mouth Every Night. 30 capsule  2018 at Unknown time   • traZODone (DESYREL) 100 MG tablet Take 1 tablet by mouth Every Night.   2018 at Unknown time     Allergies:  Review of patient's allergies indicates no known allergies.    REVIEW OF SYSTEMS:  Please see the above history of present illness for pertinent positives and negatives.  The remainder of the patient's systems have been reviewed and are negative.     Vital Signs  Temp:  [97.5 °F (36.4 °C)-98.6 °F (37 °C)] 98.6 °F (37 °C)  Heart Rate:  [62-82] 62  Resp:  [16-23] 23  BP: (132-158)/(74-90) 132/75    Flowsheet Rows         First Filed Value    Admission Height  182.9 cm (72\") Documented at 2018 1833    Admission Weight             Physical Exam:  Physical Exam   Constitutional: Patient appears well-developed and well-nourished and in no acute distress   HEENT:   Head: Normocephalic and atraumatic.   Eyes:  Pupils are equal, round, and reactive to light. EOM are intact. Sclera are anicteric and non-injected.  Mouth and Throat: Patient has moist mucous membranes. Oropharynx is clear of any erythema or exudate.     Neck: Neck supple. No JVD present. No thyromegaly present. No lymphadenopathy present.  Cardiovascular: Regular rate, " regular rhythm, S1 normal and S2 normal.  Exam reveals no gallop and no friction rub.  No murmur heard.  Pulmonary/Chest: Lungs are clear to auscultation bilaterally. No respiratory distress. No wheezes. No rhonchi. No rales.   Abdominal: Soft. Bowel sounds are normal. No distension and no mass. There is no hepatosplenomegaly. There is no tenderness.   Musculoskeletal: Normal Muscle tone  Extremities: No edema. Pulses are palpable in all 4 extremities.  Neurological: Patient is alert and oriented to person, place, and time. Cranial nerves II-XII are grossly intact with no focal deficits.  Skin: Skin is warm. No rash noted. Nails show no clubbing.  No cyanosis or erythema.     Results Review:    I reviewed the patient's new clinical results.  Lab Results (most recent)     Procedure Component Value Units Date/Time    POC Glucose Once [[id number]]  (Abnormal) Collected:  02/05/18 2038    Specimen:  Blood Updated:  02/05/18 2040     Glucose 238 (H) mg/dL     Narrative:       Meter: [id number] : [id number] Joel VAZQUEZ    POC Glucose Once [[id number]]  (Abnormal) Collected:  02/05/18 2357    Specimen:  Blood Updated:  02/06/18 0011     Glucose 254 (H) mg/dL     Narrative:       Meter: [id number] : [id number] Joel VAZQUEZ    CBC & Differential [[id number]] Collected:  02/06/18 0440    Specimen:  Blood Updated:  02/06/18 0609    Narrative:       The following orders were created for panel order CBC & Differential.  Procedure                               Abnormality         Status                     ---------                               -----------         ------                     Scan Slide[[id number]]                                                                  CBC Auto Differential[[id number]]        Abnormal            Final result                 Please view results for these tests on the individual orders.    CBC Auto Differential [[id number]]  (Abnormal) Collected:  02/06/18 0440     Specimen:  Blood Updated:  02/06/18 0609     WBC 7.48 10*3/mm3      RBC 2.54 (L) 10*6/mm3      Hemoglobin 6.9 (C) g/dL      Hematocrit 23.1 (L) %      MCV 90.9 fL      MCH 27.2 pg      MCHC 29.9 (L) g/dL      RDW 15.1 (H) %      RDW-SD 49.5 fl      MPV 11.1 fL      Platelets 266 10*3/mm3      Neutrophil % 76.3 (H) %      Lymphocyte % 14.2 (L) %      Monocyte % 6.0 %      Eosinophil % 1.5 %      Basophil % 0.7 %      Immature Grans % 1.3 (H) %      Neutrophils, Absolute 5.71 10*3/mm3      Lymphocytes, Absolute 1.06 10*3/mm3      Monocytes, Absolute 0.45 10*3/mm3      Eosinophils, Absolute 0.11 10*3/mm3      Basophils, Absolute 0.05 10*3/mm3      Immature Grans, Absolute 0.10 (H) 10*3/mm3      nRBC 0.0 /100 WBC     Urinalysis - Urine, Clean Catch [[id number]]  (Abnormal) Collected:  02/06/18 0600    Specimen:  Urine from Urine, Clean Catch Updated:  02/06/18 0613     Color, UA Yellow     Appearance, UA Clear     pH, UA 5.5     Specific Gravity, UA 1.013     Glucose,  mg/dL (2+) (A)     Ketones, UA Negative     Bilirubin, UA Negative     Blood, UA Negative     Protein,  mg/dL (2+) (A)     Leuk Esterase, UA Negative     Nitrite, UA Negative     Urobilinogen, UA 0.2 E.U./dL    Iron Profile [[id number]]  (Abnormal) Collected:  02/06/18 0440    Specimen:  Blood Updated:  02/06/18 0615     Iron 14 (L) mcg/dL      Iron Saturation 7 (L) %      Transferrin 126 (L) mg/dL      TIBC 188 mcg/dL     Magnesium [[id number]]  (Normal) Collected:  02/06/18 0440    Specimen:  Blood Updated:  02/06/18 0615     Magnesium 2.1 mg/dL     Basic Metabolic Panel [[id number]]  (Abnormal) Collected:  02/06/18 0440    Specimen:  Blood Updated:  02/06/18 0620     Glucose 258 (H) mg/dL      BUN 63 (H) mg/dL      Creatinine 3.85 (H) mg/dL      Sodium 135 (L) mmol/L      Potassium 4.9 mmol/L      Chloride 100 mmol/L      CO2 22.6 mmol/L      Calcium 8.3 (L) mg/dL      eGFR Non African Amer 16 (L) mL/min/1.73      BUN/Creatinine Ratio 16.4      Anion Gap 12.4 mmol/L     Narrative:       GFR Normal >60  Chronic Kidney Disease <60  Kidney Failure <15    Phosphorus [[id number]]  (Abnormal) Collected:  02/06/18 0440    Specimen:  Blood Updated:  02/06/18 0620     Phosphorus 6.7 (H) mg/dL     Hemoglobin A1c [[id number]]  (Abnormal) Collected:  02/06/18 0440    Specimen:  Blood Updated:  02/06/18 0710     Hemoglobin A1C 10.60 (H) %     Narrative:       Hemoglobin A1C Ranges:    Increased Risk for Diabetes  5.7% to 6.4%  Diabetes                     >= 6.5%  Diabetic Goal                < 7.0%    POC Glucose Once [[id number]]  (Abnormal) Collected:  02/06/18 0714    Specimen:  Blood Updated:  02/06/18 0728     Glucose 203 (H) mg/dL     Narrative:       Meter: [id number] : [id number] Umm Billings    Blood Gas, Arterial [[id number]]  (Abnormal) Collected:  02/06/18 0830    Specimen:  Arterial Blood Updated:  02/06/18 0840     Site Arterial: right radial     Loi's Test Positive     pH, Arterial 7.299 (C) pH units       Critical:Notify KATARINA nunez (06-Feb-18 08:39:50)Read back ok        pCO2, Arterial 52.7 (H) mm Hg      pO2, Arterial 67.4 (L) mm Hg      HCO3, Arterial 25.9 mmol/L      Base Excess, Arterial -0.6 (L) mmol/L      O2 Saturation Calculated 90.6 (L) %      Barometric Pressure for Blood Gas 755.3 mmHg      Modality Cannula     Flow Rate 3 lpm      Rate 16 Breaths/minute     Narrative:       93% Meter: [id number] : [id number] Kala Harvey    POC Glucose Once [[id number]]  (Abnormal) Collected:  02/06/18 1132    Specimen:  Blood Updated:  02/06/18 1151     Glucose 155 (H) mg/dL     Narrative:       Meter: [id number] : [id number] Umm Billings    Blood Gas, Arterial [[id number]]  (Abnormal) Collected:  02/06/18 1225    Specimen:  Arterial Blood Updated:  02/06/18 1228     Site Arterial: right radial     Loi's Test Positive     pH, Arterial 7.314 (L) pH units      pCO2, Arterial 52.2 (H) mm Hg      pO2, Arterial 61.5 (L) mm Hg       HCO3, Arterial 26.5 mmol/L      Base Excess, Arterial 0.3 mmol/L      O2 Saturation Calculated 88.6 (L) %      A-a Gradiant 0.3 mmHg      Barometric Pressure for Blood Gas 756.3 mmHg      Modality BiPap     FIO2 35 %      Set [facility] Resp Rate 20     Rate 23 Breaths/minute     Narrative:       18/8 612vt 93% Meter: [id number] : [id number] Kala Harvey    Respiratory Panel, PCR - Swab, Nasopharynx [[id number]] Collected:  02/06/18 1337    Specimen:  Swab from Nasopharynx Updated:  02/06/18 1343          Imaging Results (most recent)     Procedure Component Value Units Date/Time    US Renal Bilateral [[id number]] Collected:  02/05/18 2255     Updated:  02/05/18 2255    Narrative:       BILATERAL RENAL ULTRASOUND     CLINICAL HISTORY: lesa     FINDINGS: Longitudinal and transverse images of the kidneys were  obtained. The right kidney measures 11.5 cm in length. The left kidney  measures 13.0 cm in length. There is cortical thinning bilaterally  compatible with atrophy. There is no cystic or solid renal mass. There  is no hydronephrosis.     Bilateral pleural effusions are incidentally noted.                   Impression:       1.  Cortical atrophy, no mass or hydronephrosis.          XR Chest 1 View [[id number]] Collected:  02/06/18 1014     Updated:  02/06/18 1018    Narrative:       Portable chest x-ray     HISTORY: Hypoxia     2 portable images of the chest are provided. There is no prior exam for  comparison.     FINDINGS: There is pulmonary vascular engorgement with obscuration of  the vascular markings centrally. There appear to be posteriorly layering  small pleural effusions bilaterally. No melinda pulmonary edema is  evident. Heart size is normal. There is no pneumothorax.       Impression:       Pulmonary vascular engorgement with a lateral pleural  effusions.     This report was finalized on 2/6/2018 10:15 AM by Dr. Yoni Ford MD.           reviewed    ECG/EMG Results (most recent)     None         reviewed    Assessment/Plan     Respiratory failure  Volume overload  CKD  Ulcerative esophagitis  Diarrhea    Is NPO while on BIPAP but has WFL swallow so OK to start diet when he can. Will follow on BID PPI, If his diarrehea persist here will need C.Diff toxin checked.     I discussed the patients findings and my recommendations with patient.     Danilo Hensley MD  02/06/18  1:47 PM    Time: Not recorded

## 2018-02-06 NOTE — SIGNIFICANT NOTE
This note also relates to the following rows which could not be included:  SpO2 - Cannot attach notes to unvalidated device data    Attempted top place BiPAP on PT as ordered RN informed RT MD wanted PO meds to be given. RT explained to RN that BIPAP could not be placed until 30-40 mins post PO administration.

## 2018-02-06 NOTE — PLAN OF CARE
Problem: Pressure Ulcer (Adult)  Intervention: Promote/Optimize Nutrition  Recommend addition of a MVI with minerals, vitamin C supplement (500 mg BID x 14 days), and zinc supplement to promote wound healing.    Will add oral nutrition supplement once diet is advanced to also promote wound healing.

## 2018-02-06 NOTE — CONSULTS
CONSULT NOTE    Patient Identification:  Keivn Mckeon V  67 y.o.  male  1950  [id number]            Requesting physician: Dr. He Strange    Reason for Consultation:  Acute hypercapnic respiratory failure, management of noninvasive positive pressure ventilation, COPD management    CC: general weakness, abnormal labs    History of Present Illness:  67-year-old very poor historian.  Only able to give yes or no answers with a shake or incline of his head.  Currently on BiPAP.  Apparently he presented to the emergency room with abdominal pain and diarrhea has been treated for gastroparesis previously.  Recently was discharged from [facility] for admission for DKA as well as MRSA cellulitis.  During that admission was also found to have significant gastric disease with ulcerative esophagitis and duodenal ulcers as well.  Also carries a chart diagnosis of COPD.    Patient was admitted with ABG noted to have acute hypercapnic respiratory failure subsequent patient was placed on BiPAP for consultation for management of this.  Reviewing his chest x-ray shows extensive bilateral lower lobe infiltrates with pleural effusions suggestive of fluid overload.    Review of Systems:  Obtained best able given that he is only able to grunt or give yes or no answers through bipap.  CONSTITUTIONAL:  Denies fevers or chills  EYE:  No new vision changes  EAR:  No change in hearing  CARDIAC:  No chest pain  PULMONARY:  No productive cough or shortness of breath  GI:  No diarrhea, hematemesis or hematochezia,  RENAL:  No dysuria or urinary frequency  MUSCULOSKELETAL:  No musculoskeletal complaints  ENDOCRINE:  No heat or cold intolerance  INTEGUMENTARY: + skin rashes - abrasion on left heel  NEUROLOGICAL:  No dizziness + confusion.  No seizure activity  PSYCHIATRIC:  No new anxiety or depression  12 system review of systems performed and all else negative    Past Medical History:   Diagnosis Date   • COPD  (chronic obstructive pulmonary disease)    • Diabetes mellitus    • Hypertension        Past Surgical History:   Procedure Laterality Date   • AMPUTATION FOOT / TOE  two years ago    5th toe on right foot   • ENDOSCOPY N/A 1/19/2018    Procedure: ESOPHAGOGASTRODUODENOSCOPY;  Surgeon: Danilo Hensley MD;  Location: [facility];  Service:    • HERNIA REPAIR     • LEG DEBRIDEMENT Bilateral 1/21/2018    Procedure: LOWER EXTREMITY DEBRIDEMENT 8:00;  Surgeon: Kaylyn Tillman DO;  Location: [facility];  Service:         Prescriptions Prior to Admission   Medication Sig Dispense Refill Last Dose   • acetaminophen (TYLENOL) 325 MG tablet Take 650 mg by mouth Every 6 (Six) Hours As Needed for Mild Pain .   2/5/2018 at Unknown time   • amLODIPine (NORVASC) 10 MG tablet Take 1 tablet by mouth Daily.   2/5/2018 at Unknown time   • aspirin 81 MG EC tablet Take 81 mg by mouth Daily.   2/5/2018 at Unknown time   • buPROPion SR (WELLBUTRIN SR) 200 MG 12 hr tablet Take 1 tablet by mouth Daily.   2/5/2018 at Unknown time   • Cholecalciferol 1000 units capsule Take 1,000 Units by mouth Daily.   2/5/2018 at Unknown time   • citalopram (CeleXA) 20 MG tablet Take 20 mg by mouth Daily.   2/5/2018 at Unknown time   • CloNIDine (CATAPRES) 0.1 MG tablet Take 0.1 mg by mouth 2 (Two) Times a Day.   2/5/2018 at Unknown time   • collagenase 250 UNIT/GM ointment Apply  topically Daily.   2/5/2018 at Unknown time   • epoetin chloe (EPOGEN,PROCRIT) 91228 UNIT/ML injection Inject 1 mL under the skin 1 (One) Time Per Week. Indications: Anemia associated with Chronic Kidney Failure   2/5/2018 at Unknown time   • gabapentin (NEURONTIN) 300 MG capsule Take 1 capsule by mouth Every 12 (Twelve) Hours.   2/5/2018 at Unknown time   • hydrALAZINE (APRESOLINE) 25 MG tablet Take 3 tablets by mouth Every 8 (Eight) Hours.   2/5/2018 at Unknown time   • HYDROcodone-acetaminophen (NORCO) 5-325 MG per tablet Take 1 tablet by mouth Every 4 (Four) Hours As  "Needed for Moderate Pain . 12 tablet 0 2018 at Unknown time   • hydrophor (AQUAPHOR) ointment ointment Apply  topically As Needed (Dry Skin).   2018 at Unknown time   • insulin detemir (LEVEMIR) 100 UNIT/ML injection Inject 25 Units under the skin Every Night.  12 2018 at Unknown time   • [] lactobacillus acidophilus (RISAQUAD) capsule capsule Take 1 capsule by mouth Daily for 10 days. 10 capsule 0 2018 at Unknown time   • metoprolol tartrate (LOPRESSOR) 50 MG tablet Take 1 tablet by mouth Every 12 (Twelve) Hours.   2018 at Unknown time   • pantoprazole (PROTONIX) 40 MG EC tablet Take 1 tablet by mouth 2 (Two) Times a Day Before Meals.   2018 at Unknown time   • risperiDONE (risperDAL) 2 MG tablet Take 2 mg by mouth Daily.   2018 at Unknown time   • sodium bicarbonate 650 MG tablet Take 1 tablet by mouth 3 (Three) Times a Day.   2018 at Unknown time   • tamsulosin (FLOMAX) 0.4 MG capsule 24 hr capsule Take 1 capsule by mouth Every Night. 30 capsule  2018 at Unknown time   • traZODone (DESYREL) 100 MG tablet Take 1 tablet by mouth Every Night.   2018 at Unknown time       No Known Allergies    Social History     Social History   • Marital status: Single     Spouse name: N/A   • Number of children: N/A   • Years of education: N/A     Occupational History   • Not on file.     Social History Main Topics   • Smoking status: Former Smoker     Packs/day: 3.00     Years: 37.00     Types: Cigarettes     Quit date:    • Smokeless tobacco: Current User     Types: Chew   • Alcohol use 1.2 oz/week     2 Glasses of wine per week      Comment: rarely   • Drug use: No   • Sexual activity: Defer     Other Topics Concern   • Not on file     Social History Narrative       Family History   Problem Relation Age of Onset   • Heart disease Father      Physical Exam:  /74  Pulse 72  Temp 98.2 °F (36.8 °C) (Oral)   Resp 16  Ht 182.9 cm (72\")  SpO2 93%  There is no height or " weight on file to calculate BMI.   BPP 35%, Volumes around 600  GENERAL:  Ill appearing on BIPAP  HEENT:  EOMI, nonicteric sclera, Bipap mask in place, unable to seen jvd given limitations of straps  PULMONARY:    Distant breath sounds, unable to roll or sit up in bed, anteriorly crackles at bases, no wheeze,  no rhonchi, symmetric air entry, unlabored resp effort  CARDIAC:  RRR   ABD: SNTND BS+  EXT: BLE edema 2+ to sacrum with erythematous changes over bilateral shins and a non bleeding abrasion on left heel, pulses symmetric 2+ bilaterally  NEURO:  Moves all ext, will nod yes or no to questions, will open eyes to questions, will speak with short yes of no, appears lethargic   SKIN:+lesions as above  PSYCH: lethargic but no anxiety    LABS:    Results from last 7 days  Lab Units 02/06/18  0440   WBC 10*3/mm3 7.48   HEMOGLOBIN g/dL 6.9*   PLATELETS 10*3/mm3 266     Results from last 7 days  Lab Units 02/06/18  0440   SODIUM mmol/L 135*   POTASSIUM mmol/L 4.9   CHLORIDE mmol/L 100   CO2 mmol/L 22.6   BUN mg/dL 63*   CREATININE mg/dL 3.85*   GLUCOSE mg/dL 258*   CALCIUM mg/dL 8.3*   MAGNESIUM mg/dL 2.1   PHOSPHORUS mg/dL 6.7*   Estimated Creatinine Clearance: 22.6 mL/min (by C-G formula based on Cr of 3.85).    Imaging: I personally visualized the images of scans/x-rays performed within last 3 days.  Imaging Results (most recent)     Procedure Component Value Units Date/Time    US Renal Bilateral [[id number]] Collected:  02/05/18 2255     Updated:  02/05/18 2255    Narrative:       BILATERAL RENAL ULTRASOUND   CLINICAL HISTORY: elsa   FINDINGS: Longitudinal and transverse images of the kidneys were  obtained. The right kidney measures 11.5 cm in length. The left kidney  measures 13.0 cm in length. There is cortical thinning bilaterally  compatible with atrophy. There is no cystic or solid renal mass. There  is no hydronephrosis.     Bilateral pleural effusions are incidentally noted.      Impression:       1.  Cortical  atrophy, no mass or hydronephrosis.     XR Chest 1 View [[id number]] Collected:  02/06/18 1014     Updated:  02/06/18 1018    Narrative:       Portable chest x-ray     HISTORY: Hypoxia     2 portable images of the chest are provided. There is no prior exam for  comparison.     FINDINGS: There is pulmonary vascular engorgement with obscuration of  the vascular markings centrally. There appear to be posteriorly layering  small pleural effusions bilaterally. No melinda pulmonary edema is  evident. Heart size is normal. There is no pneumothorax.       Impression:       Pulmonary vascular engorgement with a lateral pleural  effusions.     This report was finalized on 2/6/2018 10:15 AM by Dr. Yoni Ford MD.           Assessment / Recommendations:  Acute bilateral pulmonary edema  Acute  hypercapnic respiratory failure  Acute hypoxic resp failure  Bilateral pleural effusions  Anemia  Hyperglycemia  CKD Stage IV  DMII - poorly controlled  Routine management of NIPPV  H/o ulcerative esophagitis, duodenal erosions    Continue BIPAP at current settings.  I personally reviewed abg and bipap settings.  Recheck abg after one hour of NIPPV will allow further evalaution  Repeat procal.  Diuresis or HD per nephrology - this is likely contributing cause (sig bilateral pitting edema to sacrum, pulm olive and effusions)  No wheeze on exam to suggest ae of copd and steroids will sig worsen our glycemic control so will hold off.  Bronchodilators - will add/adjust  Monitor closely may need ICU transfer - if mental status worsens or abg worsens    Thanks for allowing us to participate in the care of this patient.  LPC is always available to discuss any concerns, questions or to help coordinate the patient's care.    Total critical care time was 60 minutes, excluding any separately billable procedure time.      Tony Mccauley MD  [facility]  02/06/18  10:26 AM

## 2018-02-06 NOTE — CONSULTS
CONSULT NOTE    Infectious Diseases - Bettie Ndiaye MD  [facility]       Patient Identification:  Name: Kevin Mckeon V  Age: 67 y.o.  Sex: male  :  1950  MRN: [id number]             Date of Consultation:  2018      Primary Care Physician: Alicia Harley MD                               Requesting Physician: Dr. Joaquin  Reason for Consultation: History of MRSA infection with possible cellulitis of the lower extremities.    Impression: Patient is a poor historian most of the information was gathered from patient's chart and discussion with nursing staff and call the consultants.  67-year-old male with complicated past medical history admitted because of the worsening renal function with underlying diabetes and chronic lower extremity edema and immobility associated wounds involving the heels and buttocks with progressive history of MRSA infection with no reported symptoms of fever chills leg pain and exam revealing degenerative changes involving the heel and stasis related blister and wound on the leg with no significant cellulitis.  White count have been normal after initial leukocytosis.  And workup did reveal hypoxemia and hypercapnia with respiratory acidosis along with renal failure.  1-bilateral lower extremity chronic edema with stasis changes and immobility related wound with no obvious active infection.  2-right heel decubitus changes with wound with no surrounding cellulitis.  3-anemia with progressive drop in hemoglobin likely due to GI loss.  4-hypercapnic hypoxemic respiratory failure on BiPAP  5-acute on chronic renal failure  6-underlying diabetes, hypertension, neuropathy        Recommendations/Discussions: Discussed with Dr. Braun at this juncture would not recommend any specific antibiotic therapy but would recommend wound care and care structure to be established going forward to see if he is a candidate for Unna boot and wound care.  Would monitor his clinical  course and low threshold to start antibiotics if he shows evidence of sepsis.  Elevated procalcitonin during the context of renal failure without any other localizing symptoms and signs of infection is of unclear significance.  Thank you Dr. Braun for letting me be the part of your patient care will follow this patient with you.        History of Present Illness:   Patient is a 67-year-old male who has complicated past medical history admitted last night because of the abnormal labs.  Patient does not have any specific symptoms but does have immobility related wound in his heel and decubiti on his buttocks.  He has chronic edema of the lower extremities but patient denies any fever and chills.  He has recent MRSA infection which has been treated.  Patient is having some issues with difficulty breathing and was noted to be in respiratory acidosis requiring to use BiPAP at this time.  He did have significant drop in his hemoglobin for which she was started on Protonix drip getting transfusion and GI has been seeing him.    Past Medical History:  Past Medical History:   Diagnosis Date   • COPD (chronic obstructive pulmonary disease)    • Diabetes mellitus    • Hypertension      Past Surgical History:  Past Surgical History:   Procedure Laterality Date   • AMPUTATION FOOT / TOE  two years ago    5th toe on right foot   • ENDOSCOPY N/A 1/19/2018    Procedure: ESOPHAGOGASTRODUODENOSCOPY;  Surgeon: Danilo Hensley MD;  [facility];  Service:    • HERNIA REPAIR     • LEG DEBRIDEMENT Bilateral 1/21/2018    Procedure: LOWER EXTREMITY DEBRIDEMENT 8:00;  Surgeon: Kaylyn Tillman DO;  Location: [facility];  Service:       Home Meds:  Prescriptions Prior to Admission   Medication Sig Dispense Refill Last Dose   • acetaminophen (TYLENOL) 325 MG tablet Take 650 mg by mouth Every 6 (Six) Hours As Needed for Mild Pain .   2/5/2018 at Unknown time   • amLODIPine (NORVASC) 10 MG tablet Take 1 tablet by mouth Daily.    2018 at Unknown time   • aspirin 81 MG EC tablet Take 81 mg by mouth Daily.   2018 at Unknown time   • buPROPion SR (WELLBUTRIN SR) 200 MG 12 hr tablet Take 1 tablet by mouth Daily.   2018 at Unknown time   • Cholecalciferol 1000 units capsule Take 1,000 Units by mouth Daily.   2018 at Unknown time   • citalopram (CeleXA) 20 MG tablet Take 20 mg by mouth Daily.   2018 at Unknown time   • CloNIDine (CATAPRES) 0.1 MG tablet Take 0.1 mg by mouth 2 (Two) Times a Day.   2018 at Unknown time   • collagenase 250 UNIT/GM ointment Apply  topically Daily.   2018 at Unknown time   • epoetin chloe (EPOGEN,PROCRIT) 07974 UNIT/ML injection Inject 1 mL under the skin 1 (One) Time Per Week. Indications: Anemia associated with Chronic Kidney Failure   2018 at Unknown time   • gabapentin (NEURONTIN) 300 MG capsule Take 1 capsule by mouth Every 12 (Twelve) Hours.   2018 at Unknown time   • hydrALAZINE (APRESOLINE) 25 MG tablet Take 3 tablets by mouth Every 8 (Eight) Hours.   2018 at Unknown time   • HYDROcodone-acetaminophen (NORCO) 5-325 MG per tablet Take 1 tablet by mouth Every 4 (Four) Hours As Needed for Moderate Pain . 12 tablet 0 2018 at Unknown time   • hydrophor (AQUAPHOR) ointment ointment Apply  topically As Needed (Dry Skin).   2018 at Unknown time   • insulin detemir (LEVEMIR) 100 UNIT/ML injection Inject 25 Units under the skin Every Night.  12 2018 at Unknown time   • [] lactobacillus acidophilus (RISAQUAD) capsule capsule Take 1 capsule by mouth Daily for 10 days. 10 capsule 0 2018 at Unknown time   • metoprolol tartrate (LOPRESSOR) 50 MG tablet Take 1 tablet by mouth Every 12 (Twelve) Hours.   2018 at Unknown time   • pantoprazole (PROTONIX) 40 MG EC tablet Take 1 tablet by mouth 2 (Two) Times a Day Before Meals.   2018 at Unknown time   • risperiDONE (risperDAL) 2 MG tablet Take 2 mg by mouth Daily.   2018 at Unknown time   • sodium  bicarbonate 650 MG tablet Take 1 tablet by mouth 3 (Three) Times a Day.   2/5/2018 at Unknown time   • tamsulosin (FLOMAX) 0.4 MG capsule 24 hr capsule Take 1 capsule by mouth Every Night. 30 capsule  2/4/2018 at Unknown time   • traZODone (DESYREL) 100 MG tablet Take 1 tablet by mouth Every Night.   2/4/2018 at Unknown time     Current Meds:     Current Facility-Administered Medications:   •  acetaminophen (TYLENOL) tablet 650 mg, 650 mg, Oral, Q6H PRN, Hank Joaquin MD  •  amLODIPine (NORVASC) tablet 10 mg, 10 mg, Oral, Q24H, Hank Joaquin MD, 10 mg at 02/06/18 0942  •  aspirin chewable tablet 81 mg, 81 mg, Oral, Daily, Hank Joaquin MD, 81 mg at 02/06/18 0942  •  buPROPion SR (WELLBUTRIN SR) 12 hr tablet 200 mg, 200 mg, Oral, Q12H, Hank Joaquin MD, 200 mg at 02/06/18 0942  •  cholecalciferol (VITAMIN D3) tablet 1,000 Units, 1,000 Units, Oral, Daily, Hank Joaquin MD, 1,000 Units at 02/06/18 0943  •  citalopram (CeleXA) tablet 20 mg, 20 mg, Oral, Daily, Hank Joaquin MD, 20 mg at 02/06/18 0942  •  CloNIDine (CATAPRES) tablet 0.1 mg, 0.1 mg, Oral, Q12H, Hank Joaquin MD, 0.1 mg at 02/06/18 0942  •  dextrose (D50W) solution 25 g, 25 g, Intravenous, Q15 Min PRN, Danilo Del Valle MD  •  dextrose (GLUTOSE) oral gel 15 g, 15 g, Oral, Q15 Min PRN, Danilo Del Valle MD  •  furosemide (LASIX) tablet 40 mg, 40 mg, Oral, TID, Best Fletcher MD, 40 mg at 02/06/18 0942  •  gabapentin (NEURONTIN) capsule 300 mg, 300 mg, Oral, Q12H, Hank Joaquin MD, 300 mg at 02/06/18 0941  •  glucagon (human recombinant) (GLUCAGEN DIAGNOSTIC) injection 1 mg, 1 mg, Subcutaneous, PRN, Danilo Del Valle MD  •  hydrALAZINE (APRESOLINE) tablet 25 mg, 25 mg, Oral, Q8H, Hank Joaquin MD, 25 mg at 02/06/18 0965  •  HYDROcodone-acetaminophen (NORCO) 5-325 MG per tablet 1 tablet, 1 tablet, Oral, Q4H PRN, Hank Joaquin MD, 1 tablet at 02/05/18 5096  •  insulin aspart (novoLOG)  injection 0-9 Units, 0-9 Units, Subcutaneous, 4x Daily With Meals & Nightly, Danilo Del Valle MD, 4 Units at 02/06/18 0943  •  insulin detemir (LEVEMIR) injection 25 Units, 25 Units, Subcutaneous, Nightly, Hank Joaquin MD, 25 Units at 02/06/18 0002  •  ipratropium-albuterol (DUO-NEB) nebulizer solution 3 mL, 3 mL, Nebulization, Q6H While Awake - RT, He Strange MD, 3 mL at 02/06/18 0831  •  metoprolol tartrate (LOPRESSOR) tablet 50 mg, 50 mg, Oral, Q12H, Hank Joaquin MD, 50 mg at 02/06/18 0942  •  [COMPLETED] pantoprazole (PROTONIX) injection 80 mg, 80 mg, Intravenous, Once, 80 mg at 02/06/18 0941 **AND** pantoprazole (PROTONIX) 40 mg/100 mL (0.4 mg/mL) in 0.9% NS IVPB, 8 mg/hr, Intravenous, Continuous, He Strange MD, Last Rate: 20 mL/hr at 02/06/18 0941, 8 mg/hr at 02/06/18 0941  •  risperiDONE (risperDAL) tablet 2 mg, 2 mg, Oral, Nightly, Hank Joauqin MD, 2 mg at 02/05/18 2358  •  sodium bicarbonate tablet 650 mg, 650 mg, Oral, TID, Hank Joaquin MD, 650 mg at 02/06/18 0942  •  tamsulosin (FLOMAX) 24 hr capsule 0.4 mg, 0.4 mg, Oral, Nightly, Hank Joaquin MD, 0.4 mg at 02/05/18 2357  •  traZODone (DESYREL) tablet 100 mg, 100 mg, Oral, Nightly, Hank Joaquin MD, 100 mg at 02/05/18 2356  Allergies:  No Known Allergies  Social History:   Social History   Substance Use Topics   • Smoking status: Former Smoker     Packs/day: 3.00     Years: 37.00     Types: Cigarettes     Quit date: 2005   • Smokeless tobacco: Current User     Types: Chew   • Alcohol use 1.2 oz/week     2 Glasses of wine per week      Comment: rarely      Family History:  Family History   Problem Relation Age of Onset   • Heart disease Father           Review of Systems  See history of present illness and past medical history.   on BiPAP and cannot communicate and little bit somnolent..      Vitals:   /74  Pulse 72  Temp 98.2 °F (36.8 °C) (Oral)   Resp 16  Ht 182.9 cm  "(72\")  SpO2 93%  I/O:   Intake/Output Summary (Last 24 hours) at 02/06/18 1049  Last data filed at 02/06/18 0842   Gross per 24 hour   Intake              120 ml   Output              850 ml   Net             -730 ml     Exam:  General Appearance:    Somnolent on BiPAP    Head:    Normocephalic, without obvious abnormality, atraumatic   Eyes:    PERRL, conjunctiva/corneas clear, EOM's intact, both eyes   Ears:    Normal external ear canals, both ears   Nose:   Nares normal, septum midline, mucosa normal, no drainage    or sinus tenderness   Throat:   Lips, tongue, gums normal; oral mucosa pink and moist   Neck:   Supple, symmetrical, trachea midline, no adenopathy;     thyroid:  no enlargement/tenderness/nodules; no carotid    bruit or JVD   Back:     Symmetric, no curvature, ROM normal, no CVA tenderness   Lungs:     Clear to auscultation bilaterally, respirations unlabored   Chest Wall:    No tenderness or deformity    Heart:    Regular rate and rhythm, S1 and S2 normal, no murmur, rub   or gallop   Abdomen:     Soft, non-tender, bowel sounds active all four quadrants,     no masses, no hepatomegaly, no splenomegaly   Extremities:   Bilateral lower extremity edema left lower leg abrasion and ulceration of venous stasis type noted right lower leg ulceration of venous stasis type noted with no surrounding cellulitis left heel abrasion with unstageable wound with no surrounding cellulitis.     Pulses:   Pulses palpable in all extremities; symmetric all extremities   Skin:   Skin color normal, Skin is warm and dry,  no rashes or palpable lesions       Data Review:    I reviewed the patient's new clinical results.    Results from last 7 days  Lab Units 02/06/18  0440   WBC 10*3/mm3 7.48   HEMOGLOBIN g/dL 6.9*   PLATELETS 10*3/mm3 266       Results from last 7 days  Lab Units 02/06/18  0440   SODIUM mmol/L 135*   POTASSIUM mmol/L 4.9   CHLORIDE mmol/L 100   CO2 mmol/L 22.6   BUN mg/dL 63*   CREATININE mg/dL 3.85* "   CALCIUM mg/dL 8.3*   GLUCOSE mg/dL 258*     Microbiology Results (last 10 days)     Procedure Component Value - Date/Time    Respiratory Panel, PCR - Swab, Nasopharynx [[id number]]  (Normal) Collected:  02/06/18 1337    Lab Status:  Final result Specimen:  Swab from Nasopharynx Updated:  02/06/18 1636     ADENOVIRUS, PCR Not Detected     Coronavirus 229E Not Detected     Coronavirus HKU1 Not Detected     Coronavirus NL63 Not Detected     Coronavirus OC43 Not Detected     Human Metapneumovirus Not Detected     Human Rhinovirus/Enterovirus Not Detected     Influenza B PCR Not Detected     Parainfluenza Virus 1 Not Detected     Parainfluenza Virus 2 Not Detected     Parainfluenza Virus 3 Not Detected     Parainfluenza Virus 4 Not Detected     Bordetella pertussis pcr Not Detected     Influenza A H1N1 2009 PCR Not Detected     Chlamydophila pneumoniae PCR Not Detected     Mycoplasma pneumo by PCR Not Detected     Influenza A PCR Not Detected     Influenza A H3 Not Detected     Influenza A H1 Not Detected     RSV, PCR Not Detected              Assessment:  There are no hospital problems to display for this patient.        Plan:  The above  Bettie Gutierrez MD   2/6/2018  10:49 AM    Much of this encounter note is an electronic transcription/translation of spoken language to printed text. The electronic translation of spoken language may permit erroneous, or at times, nonsensical words or phrases to be inadvertently transcribed; Although I have reviewed the note for such errors, some may still exist

## 2018-02-06 NOTE — NURSING NOTE
02/06/18 1115   Wound 02/05/18 1945 Left lower leg abrasion;ulceration, venous   Date first assessed/Time first assessed: 02/05/18 1945   Side: Left  Orientation: lower  Location: leg  Type: abrasion;ulceration, venous  Additional Comments: multiple areas of involvement   Wound WDL ex   Dressing Appearance moist drainage   Base slough;yellow;moist;pink   Periwound Area pink;dry   Edges irregular;open   Drainage Characteristics/Odor serous   Drainage Amount small   Wound Interventions (recommend opticel ag + silicone border)   Wound 02/05/18 1945 Right lateral leg ulceration, venous   Date first assessed/Time first assessed: 02/05/18 1945   Side: Right  Orientation: lateral  Location: leg  Type: ulceration, venous   Wound WDL ex   Dressing Appearance moist drainage   Base slough;yellow;moist;pink   Periwound Area pink;dry   Edges irregular;open   Drainage Characteristics/Odor creamy;yellow   Drainage Amount moderate   Wound Interventions (recommend opticel ag + silicone border dressing)   Pressure Ulcer 02/05/18 1945 other (see comments) coccyx Stage II   Date first assessed/Time first assessed: 02/05/18 1945   Present On Admission (Pressure Ulcer): yes;picture taken  Side: other (see comments)  Location: coccyx  Stage: Stage II  Additional Comments: healing   Pressure Ulcer Appearance reddened;moist;pink   Periwound Area moist   Pressure Ulcer Wound Care (recommend turning, barrier ointment)   Pressure Ulcer 02/05/18 1945 Left heel suspected deep tissue injury   Date first assessed/Time first assessed: 02/05/18 1945   Present On Admission (Pressure Ulcer): yes;picture taken  Side: Left  Location: heel  Stage: suspected deep tissue injury   Pressure Ulcer Appearance dry;maroon;pink  (appears to be healing/fading in color)   Periwound Area dry;other (see comments)  (loose callus)   Pressure Ulcer Therapeutic Interventions (off load, moisturize)     CWOCN consult for multiple skin issues as noted above.  Left heel  appears to have been a blister/DTI that now the outer skin has come off and it is in the healing process. BLE are dry. Recommend to moisturize BLE/heels.   Place Accumax pump.

## 2018-02-06 NOTE — H&P
Chief complaint abnorma labs      History present illness :  Kevin Mckeon V this is a 67-year-old male with a past medical history of CKD stage 4 and diabetes who presented with a chief complaint of being found with abnormal labs.  Because his GFR reportedly called the er was found to be 15 was transferred here for possible need of dialysis in the near future.  The patient also complains of pain in the heel wound and also in the decubiti wound on his buttocks.  He denies shortness of breath chest pain nausea vomiting diarrhea or abdominal pain.  He has a very good appetite in fact he is hungry right now.  He states he barely can walk with a walker and he can barely stand up.  He denies tremors Weakness Headache  Weakness Fever Chills.  He has no other complaints.  \Creatinine was 3.57 on January 25 this year.    Review of Systems  Pertinent items are noted in HPI        Past Medical History:   Diagnosis Date   • COPD (chronic obstructive pulmonary disease)    • Diabetes mellitus    • Hypertension        Past Surgical History:   Procedure Laterality Date   • AMPUTATION FOOT / TOE  two years ago    5th toe on right foot   • ENDOSCOPY N/A 1/19/2018    Procedure: ESOPHAGOGASTRODUODENOSCOPY;  Surgeon: Danilo Hensley MD;  Location: [facility];  Service:    • HERNIA REPAIR     • LEG DEBRIDEMENT Bilateral 1/21/2018    Procedure: LOWER EXTREMITY DEBRIDEMENT 8:00;  Surgeon: Kaylyn Tillman DO;  Location: [facility] OR;  Service:        Objective      Vital Signs    Temp:  [97.5 °F (36.4 °C)] 97.5 °F (36.4 °C)  Heart Rate:  [74] 74  Resp:  [19] 19  BP: (149)/(85) 149/85    No intake or output data in the 24 hours ending 02/05/18 2000      Physical Exam:   Constitutional : well developed ,No acute distress  ENMT lips pink oral mucosa moist   Eyes sclerae white PERRL  Respiratory: Clear to auscultation , No crackles no wheezing respirations are even and un-labored  GI: Soft, Non tender, BS active in all 4 quadrants  obese  CVS: S1 S2 regular, no rub murmur or gallops  Skin warm dry no rashes no petechiae  Neuro grossly nonfocal cranial nerves 2-12 grossly intact sensation intact  Ext: 1 plus LE b/l edema, Peripheral pulses intact   Heme no cervical  lymphadenopathy no petechiae  Heel ulcer right LE below knee 4 cm ulcer with clean base  Decubitus ulcers    Results Review:   I reviewed the patient's new clinical results.    WBC No results found for: WBCS   HGB No results found for: HGB   HCT No results found for: HCT   Platlets No results found for: LABPLAT   MCV No results found for: MCV       Sodium  No results found for: NA   Potassium  No results found for: K   Chloride  No results found for: CL   CO2  No results found for: CO2   BUN  No results found for: BUN   Creatinine  No results found for: CREATININE   Calcium  No results found for: CALCIUM   PO4  No results found for: CAPO4   Albumin  No results found for: ALBUMIN   Magnesium  No results found for: MG   Uric Acid  No results found for: URICACID               Imaging Studies: I have personally reviewed the pertinent imaging studies     Medications        No current facility-administered medications for this encounter.       Assessment/Plan     Active Problems:    * No active hospital problems. *      CKD stage IV  Anemia of chronic disease  Lower Extremity edema  Diabetes mellitus  Multiple skin ulcers  Decubitus ulcers  Deconditioning  COPD  Hypertension      Check a basic metabolic panel in the morning of renal ultrasound and a PV  Physical therapy consult   wound care consult  Guaiac stools times 3 get a GI consult to rule out GI bleed  Repeat CBC transfusion hemoglobin  Transfuse if hb less than 7  Resume home meds  Gentle diuresis  Resume epogen monitor hb  Further management as indicated by clinical course          The above assessment and plan was discussed at length with the patient who voiced understanding and agrees to proceed with the plan as outlined  above.All the patient's questions were answered to his/her satisfaction.  Chart reviewed.  Best Fletcher MD  02/05/18  8:00 PM

## 2018-02-06 NOTE — PROGRESS NOTES
Came by to see Mr Mckeon but unable to see him presently due to maintenance issues.  The ENTIRE hallway is closed to was the floor?  Discussed him with his nurse and SSI insulin added to his basal Levemir.  A1c is ordered.  Will come back by later if the hallway is reopened otherwise he will be seen in the AM.  Danilo Del Valle MD  11:32 PM

## 2018-02-06 NOTE — PLAN OF CARE
Problem: Patient Care Overview (Adult)  Goal: Plan of Care Review  Outcome: Ongoing (interventions implemented as appropriate)   02/06/18 0442   Coping/Psychosocial Response Interventions   Plan Of Care Reviewed With patient   Patient Care Overview   Progress no change   Outcome Evaluation   Outcome Summary/Follow up Plan VSS, Pt A&Ox4. ID, wound, internal med consults called in. Renal ultrasound done on this shift. Awaiting further orders. Will continue to monitor pt closely       Problem: Fall Risk (Adult)  Goal: Identify Related Risk Factors and Signs and Symptoms  Outcome: Ongoing (interventions implemented as appropriate)    Goal: Absence of Falls  Outcome: Ongoing (interventions implemented as appropriate)      Problem: Renal Failure/Kidney Injury, Acute (Adult)  Goal: Signs and Symptoms of Listed Potential Problems Will be Absent or Manageable (Renal Failure/Kidney Injury, Acute)  Outcome: Ongoing (interventions implemented as appropriate)      Problem: Diabetes, Type 2 (Adult)  Goal: Signs and Symptoms of Listed Potential Problems Will be Absent or Manageable (Diabetes, Type 2)  Outcome: Ongoing (interventions implemented as appropriate)      Problem: Pressure Ulcer (Adult)  Goal: Signs and Symptoms of Listed Potential Problems Will be Absent or Manageable (Pressure Ulcer)  Outcome: Ongoing (interventions implemented as appropriate)

## 2018-02-06 NOTE — PROGRESS NOTES
"   LOS: 1 day   Patient Care Team:  Alicia Harley MD as PCP - General (Family Medicine)    Chief Complaint/ Reason for encounter: Acute renal failure/shortness of breath        Subjective     History of Present Illness    Subjective:  Symptoms:  Worsening.  He reports shortness of breath and weakness.  No chest pain.  (Patient was seen and examined  Not not doing as well today, increased work of breathing, pulmonary was consulted and he was placed on BiPAP  Somewhat more lethargic this afternoon.  per nursing, he was awake alert and oriented answering questions this morning).    Diet:  Poor intake.  No nausea.    Activity level: Impaired due to weakness.    Pain:  He reports no pain.          History taken from: Patient and chart    Objective     Vital Signs  Temp:  [97.5 °F (36.4 °C)-98.6 °F (37 °C)] 97.6 °F (36.4 °C)  Heart Rate:  [62-82] 62  Resp:  [16-23] 16  BP: (126-158)/(71-90) 126/71       Wt Readings from Last 1 Encounters:   02/06/18 1409 83.7 kg (184 lb 8.4 oz)       Objective:  General Appearance:  Comfortable, ill-appearing, in no acute distress and not in pain (On BiPAP, less responsive).    Vital signs: (most recent): Blood pressure 126/71, pulse 62, temperature 97.6 °F (36.4 °C), temperature source Oral, resp. rate 16, height 182.9 cm (72.01\"), weight 83.7 kg (184 lb 8.4 oz), SpO2 95 %.  Vital signs are normal.  No fever.    Output: Producing urine.    HEENT: Normal HEENT exam.    Lungs:  Normal respiratory rate and normal effort.  He is not in respiratory distress.  Breath sounds clear to auscultation.  There are rhonchi and decreased breath sounds.    Heart: Normal rate.  Regular rhythm.  No murmur.   Abdomen: Abdomen is soft and non-distended.  Bowel sounds are normal.   There is no abdominal tenderness.  There is no epigastric area or no suprapubic area tenderness.     Extremities: Normal range of motion.  There is dependent edema.  There is no deformity.    Pulses: Distal pulses are " intact.    Skin:  Warm and dry.  No rash or cyanosis.             Results Review:    Past Medical History: reviewed and updated  Past Medical History:   Diagnosis Date   • COPD (chronic obstructive pulmonary disease)    • Diabetes mellitus    • Hypertension          Allergies:  No Known Allergies    Intake/Output:     Intake/Output Summary (Last 24 hours) at 02/06/18 1504  Last data filed at 02/06/18 1126   Gross per 24 hour   Intake              120 ml   Output              850 ml   Net             -730 ml         DATA:  Radiology and Labs:  The following labs independently reviewed by me  Old records independently reviewed showing Stage IV chronic kidney disease, progressive with uncontrolled diabetes/proteinuria  The following radiologic studies independently reviewed by me, findings renal ultrasound with no obstruction, medical renal disease seen.  No hydronephrosis  Interval notes, chart personally reviewed by me.   New problems include worsening renal failure, worsening respiratory failure  Discussed with Dr. Dixon and Dr. Gutierrez      Risk/ complexity of medical care/ medical decision making high given his new renal failure, need for hemodialysis                Labs:   Recent Results (from the past 24 hour(s))   POC Glucose Once    Collection Time: 02/05/18  8:38 PM   Result Value Ref Range    Glucose 238 (H) 70 - 130 mg/dL   POC Glucose Once    Collection Time: 02/05/18 11:57 PM   Result Value Ref Range    Glucose 254 (H) 70 - 130 mg/dL   Hemoglobin A1c    Collection Time: 02/06/18  4:40 AM   Result Value Ref Range    Hemoglobin A1C 10.60 (H) 4.80 - 5.60 %   Basic Metabolic Panel    Collection Time: 02/06/18  4:40 AM   Result Value Ref Range    Glucose 258 (H) 65 - 99 mg/dL    BUN 63 (H) 8 - 23 mg/dL    Creatinine 3.85 (H) 0.76 - 1.27 mg/dL    Sodium 135 (L) 136 - 145 mmol/L    Potassium 4.9 3.5 - 5.2 mmol/L    Chloride 100 98 - 107 mmol/L    CO2 22.6 22.0 - 29.0 mmol/L    Calcium 8.3 (L) 8.6 - 10.5 mg/dL     eGFR Non African Amer 16 (L) >60 mL/min/1.73    BUN/Creatinine Ratio 16.4 7.0 - 25.0    Anion Gap 12.4 mmol/L   Phosphorus    Collection Time: 02/06/18  4:40 AM   Result Value Ref Range    Phosphorus 6.7 (H) 2.5 - 4.5 mg/dL   Iron Profile    Collection Time: 02/06/18  4:40 AM   Result Value Ref Range    Iron 14 (L) 59 - 158 mcg/dL    Iron Saturation 7 (L) 20 - 50 %    Transferrin 126 (L) 200 - 360 mg/dL    TIBC 188 mcg/dL   Magnesium    Collection Time: 02/06/18  4:40 AM   Result Value Ref Range    Magnesium 2.1 1.6 - 2.4 mg/dL   CBC Auto Differential    Collection Time: 02/06/18  4:40 AM   Result Value Ref Range    WBC 7.48 4.50 - 10.70 10*3/mm3    RBC 2.54 (L) 4.60 - 6.00 10*6/mm3    Hemoglobin 6.9 (C) 13.7 - 17.6 g/dL    Hematocrit 23.1 (L) 40.4 - 52.2 %    MCV 90.9 79.8 - 96.2 fL    MCH 27.2 27.0 - 32.7 pg    MCHC 29.9 (L) 32.6 - 36.4 g/dL    RDW 15.1 (H) 11.5 - 14.5 %    RDW-SD 49.5 37.0 - 54.0 fl    MPV 11.1 6.0 - 12.0 fL    Platelets 266 140 - 500 10*3/mm3    Neutrophil % 76.3 (H) 42.7 - 76.0 %    Lymphocyte % 14.2 (L) 19.6 - 45.3 %    Monocyte % 6.0 5.0 - 12.0 %    Eosinophil % 1.5 0.3 - 6.2 %    Basophil % 0.7 0.0 - 1.5 %    Immature Grans % 1.3 (H) 0.0 - 0.5 %    Neutrophils, Absolute 5.71 1.90 - 8.10 10*3/mm3    Lymphocytes, Absolute 1.06 0.90 - 4.80 10*3/mm3    Monocytes, Absolute 0.45 0.20 - 1.20 10*3/mm3    Eosinophils, Absolute 0.11 0.00 - 0.70 10*3/mm3    Basophils, Absolute 0.05 0.00 - 0.20 10*3/mm3    Immature Grans, Absolute 0.10 (H) 0.00 - 0.03 10*3/mm3    nRBC 0.0 0.0 - 0.0 /100 WBC   Urinalysis - Urine, Clean Catch    Collection Time: 02/06/18  6:00 AM   Result Value Ref Range    Color, UA Yellow Yellow, Straw    Appearance, UA Clear Clear    pH, UA 5.5 5.0 - 8.0    Specific Gravity, UA 1.013 1.005 - 1.030    Glucose,  mg/dL (2+) (A) Negative    Ketones, UA Negative Negative    Bilirubin, UA Negative Negative    Blood, UA Negative Negative    Protein,  mg/dL (2+) (A) Negative     Leuk Esterase, UA Negative Negative    Nitrite, UA Negative Negative    Urobilinogen, UA 0.2 E.U./dL 0.2 - 1.0 E.U./dL   POC Glucose Once    Collection Time: 02/06/18  7:14 AM   Result Value Ref Range    Glucose 203 (H) 70 - 130 mg/dL   Blood Gas, Arterial    Collection Time: 02/06/18  8:30 AM   Result Value Ref Range    Site Arterial: right radial     Loi's Test Positive     pH, Arterial 7.299 (C) 7.350 - 7.450 pH units    pCO2, Arterial 52.7 (H) 35.0 - 45.0 mm Hg    pO2, Arterial 67.4 (L) 80.0 - 100.0 mm Hg    HCO3, Arterial 25.9 22.0 - 28.0 mmol/L    Base Excess, Arterial -0.6 (L) 0.0 - 2.0 mmol/L    O2 Saturation Calculated 90.6 (L) 92.0 - 99.0 %    Barometric Pressure for Blood Gas 755.3 mmHg    Modality Cannula     Flow Rate 3 lpm    Rate 16 Breaths/minute   Type & Screen    Collection Time: 02/06/18 10:07 AM   Result Value Ref Range    ABO Type A     RH type Positive     Antibody Screen Negative    Prepare RBC, 1 Units    Collection Time: 02/06/18 11:05 AM   Result Value Ref Range    Product Code R2057P71     Unit Number [id number]     UNIT  ABO A     UNIT  RH POS     Dispense Status XM     Blood Type APOS     Blood Expiration Date [id number]     Blood Type Barcode 6200    POC Glucose Once    Collection Time: 02/06/18 11:32 AM   Result Value Ref Range    Glucose 155 (H) 70 - 130 mg/dL   Blood Gas, Arterial    Collection Time: 02/06/18 12:25 PM   Result Value Ref Range    Site Arterial: right radial     Loi's Test Positive     pH, Arterial 7.314 (L) 7.350 - 7.450 pH units    pCO2, Arterial 52.2 (H) 35.0 - 45.0 mm Hg    pO2, Arterial 61.5 (L) 80.0 - 100.0 mm Hg    HCO3, Arterial 26.5 22.0 - 28.0 mmol/L    Base Excess, Arterial 0.3 0.0 - 2.0 mmol/L    O2 Saturation Calculated 88.6 (L) 92.0 - 99.0 %    A-a Gradiant 0.3 mmHg    Barometric Pressure for Blood Gas 756.3 mmHg    Modality BiPap     FIO2 35 %    Set Mech Resp Rate 20     Rate 23 Breaths/minute   Hemoglobin & Hematocrit, Blood    Collection  Time: 02/06/18  2:02 PM   Result Value Ref Range    Hemoglobin 6.8 (C) 13.7 - 17.6 g/dL    Hematocrit 22.9 (L) 40.4 - 52.2 %       Radiology:  Imaging Results (last 24 hours)     Procedure Component Value Units Date/Time    US Renal Bilateral [[id number]] Collected:  02/05/18 2255     Updated:  02/05/18 2255    Narrative:       BILATERAL RENAL ULTRASOUND     CLINICAL HISTORY: lesa     FINDINGS: Longitudinal and transverse images of the kidneys were  obtained. The right kidney measures 11.5 cm in length. The left kidney  measures 13.0 cm in length. There is cortical thinning bilaterally  compatible with atrophy. There is no cystic or solid renal mass. There  is no hydronephrosis.     Bilateral pleural effusions are incidentally noted.                   Impression:       1.  Cortical atrophy, no mass or hydronephrosis.          XR Chest 1 View [[id number]] Collected:  02/06/18 1014     Updated:  02/06/18 1018    Narrative:       Portable chest x-ray     HISTORY: Hypoxia     2 portable images of the chest are provided. There is no prior exam for  comparison.     FINDINGS: There is pulmonary vascular engorgement with obscuration of  the vascular markings centrally. There appear to be posteriorly layering  small pleural effusions bilaterally. No melinda pulmonary edema is  evident. Heart size is normal. There is no pneumothorax.       Impression:       Pulmonary vascular engorgement with a lateral pleural  effusions.     This report was finalized on 2/6/2018 10:15 AM by Dr. Yoni Ford MD.                Medications have been reviewed:  Current Facility-Administered Medications   Medication Dose Route Frequency Provider Last Rate Last Dose   • acetaminophen (TYLENOL) tablet 650 mg  650 mg Oral Q6H PRN Hank Joaquin MD       • amLODIPine (NORVASC) tablet 10 mg  10 mg Oral Q24H Hank Joaquin MD   10 mg at 02/06/18 0942   • arformoterol (BROVANA) nebulizer solution 15 mcg  15 mcg Nebulization BID - RT Tony  Yoni Mccauley MD   15 mcg at 02/06/18 1232   • aspirin chewable tablet 81 mg  81 mg Oral Daily Hank Joaquin MD   81 mg at 02/06/18 0942   • budesonide (PULMICORT) nebulizer solution 0.25 mg  0.25 mg Nebulization BID - RT Tony Mccauley MD   0.25 mg at 02/06/18 1232   • buPROPion SR (WELLBUTRIN SR) 12 hr tablet 200 mg  200 mg Oral Q12H Hank Joaquin MD   200 mg at 02/06/18 0942   • cholecalciferol (VITAMIN D3) tablet 1,000 Units  1,000 Units Oral Daily Hank Joaquin MD   1,000 Units at 02/06/18 0943   • citalopram (CeleXA) tablet 20 mg  20 mg Oral Daily Hank Joaquin MD   20 mg at 02/06/18 0942   • CloNIDine (CATAPRES) tablet 0.1 mg  0.1 mg Oral Q12H Hank Joaquin MD   0.1 mg at 02/06/18 0942   • dextrose (D50W) solution 25 g  25 g Intravenous Q15 Min PRN Danilo Del Valle MD       • dextrose (GLUTOSE) oral gel 15 g  15 g Oral Q15 Min PRN Danilo Del Valle MD       • furosemide (LASIX) tablet 40 mg  40 mg Oral TID Best Fletcher MD   40 mg at 02/06/18 0942   • gabapentin (NEURONTIN) capsule 300 mg  300 mg Oral Q12H Hank Joaquin MD   300 mg at 02/06/18 0941   • glucagon (human recombinant) (GLUCAGEN DIAGNOSTIC) injection 1 mg  1 mg Subcutaneous PRN Danilo Del Valle MD       • hydrALAZINE (APRESOLINE) tablet 25 mg  25 mg Oral Q8H Hank Joaquin MD   25 mg at 02/06/18 0950   • hydrocerin (EUCERIN) cream   Topical Daily He Strange MD       • HYDROcodone-acetaminophen (NORCO) 5-325 MG per tablet 1 tablet  1 tablet Oral Q4H PRN Hank Joaquin MD   1 tablet at 02/05/18 8810   • insulin aspart (novoLOG) injection 0-9 Units  0-9 Units Subcutaneous 4x Daily With Meals & Nightly Danilo Del Valle MD   2 Units at 02/06/18 1322   • insulin detemir (LEVEMIR) injection 25 Units  25 Units Subcutaneous Nightly Hank Joaquin MD   25 Units at 02/06/18 0002   • ipratropium-albuterol (DUO-NEB) nebulizer solution 3 mL  3 mL Nebulization Q6H While  Awake - RT He Strange MD   3 mL at 02/06/18 0831   • metoprolol tartrate (LOPRESSOR) tablet 50 mg  50 mg Oral Q12H Hank Joaquin MD   50 mg at 02/06/18 0942   • pantoprazole (PROTONIX) 40 mg/100 mL (0.4 mg/mL) in 0.9% NS IVPB  8 mg/hr Intravenous Continuous He Strange MD 20 mL/hr at 02/06/18 0941 8 mg/hr at 02/06/18 0941   • risperiDONE (risperDAL) tablet 2 mg  2 mg Oral Nightly Hank Joaquin MD   2 mg at 02/05/18 2358   • sodium bicarbonate tablet 650 mg  650 mg Oral TID Hank Joaquin MD   650 mg at 02/06/18 0942   • tamsulosin (FLOMAX) 24 hr capsule 0.4 mg  0.4 mg Oral Nightly Hank Joaquin MD   0.4 mg at 02/05/18 2357   • traZODone (DESYREL) tablet 100 mg  100 mg Oral Nightly Hank Joaquin MD   100 mg at 02/05/18 2356       Assessment/Plan     Active Problems:    Acute renal failure (ARF)      Assessment:  (Assessment:  CKD stage IV, worsening with likely progression to end-stage renal disease  Anemia of chronic disease with iron deficiency  Lower Extremity edema, worsening secondary to acute renal failure  Diabetes mellitus  Multiple skin ulcers, infectious disease to see  Decubitus ulcers  Deconditioning  COPD  Hypertension  Tobacco abuse  Peripheral vascular disease       Plan:  Respiratory status a bit worse today, volume overloaded on exam  Discussed with Dr. Dioxn, needs additional diuretics and dialysis initiation  Appreciate pulmonary and [facility] assistance  Dr. Gutierrez to seen for lower extremity ulcers/cellulitis  Physical therapy consult   wound care consult  Repeat CBC, transfuse for hgb less than 7  Resume home meds  Resume epogen weekly, add IV iron 200 mg ×5 doses  BiPAP per pulmonary as needed  Consult vascular for Shiley catheter and plan dialysis initiation tonight, ultrafiltration with dialysis as tolerated    ).             Continue to monitor renal function, electroytes and volume closely   Please call me with any questions or  concerns      Hank Joaquin MD   [facility]   [phone]    02/06/18  3:04 PM      Dictation performed using Dragon dictation software

## 2018-02-06 NOTE — CONSULTS
Patient Identification:  Name: Kevin Mckeon V  Age/Sex: 67 y.o. 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 We are consulted for management of his DM and other medical problems. He also complains of N/V for the past month. He has early satiety and has possible gastroparesis. He states he has also had some upper abdominal pain and diarrhea. It appears he was admitted in mid January with MRSA cellulitis and found to have duodenal erosions and ulcerative esophagitis.     Past Medical History:   Diagnosis Date   • COPD (chronic obstructive pulmonary disease)    • Diabetes mellitus    • Hypertension      Past Surgical History:   Procedure Laterality Date   • AMPUTATION FOOT / TOE  two years ago    5th toe on right foot   • ENDOSCOPY N/A 2018    Procedure: ESOPHAGOGASTRODUODENOSCOPY;  Surgeon: Danilo Hensley MD;  Location: [facility];  Service:    • HERNIA REPAIR     • LEG DEBRIDEMENT Bilateral 2018    Procedure: LOWER EXTREMITY DEBRIDEMENT 8:00;  Surgeon: Kaylyn Tillman DO;  Location: [facility];  Service:      Family History   Problem Relation Age of Onset   • Heart disease Father      Social History   Substance Use Topics   • Smoking status: Former Smoker     Packs/day: 3.00     Years: 37.00     Types: Cigarettes     Quit date:    • Smokeless tobacco: Current User     Types: Chew   • Alcohol use 1.2 oz/week     2 Glasses of wine per week      Comment: rarely     Prescriptions Prior to Admission   Medication Sig Dispense Refill Last Dose   • acetaminophen (TYLENOL) 325 MG tablet Take 650 mg by mouth Every 6 (Six) Hours As Needed for Mild Pain .   2018 at Unknown time   • amLODIPine (NORVASC) 10 MG  tablet Take 1 tablet by mouth Daily.   2018 at Unknown time   • aspirin 81 MG EC tablet Take 81 mg by mouth Daily.   2018 at Unknown time   • buPROPion SR (WELLBUTRIN SR) 200 MG 12 hr tablet Take 1 tablet by mouth Daily.   2018 at Unknown time   • Cholecalciferol 1000 units capsule Take 1,000 Units by mouth Daily.   2018 at Unknown time   • citalopram (CeleXA) 20 MG tablet Take 20 mg by mouth Daily.   2018 at Unknown time   • CloNIDine (CATAPRES) 0.1 MG tablet Take 0.1 mg by mouth 2 (Two) Times a Day.   2018 at Unknown time   • collagenase 250 UNIT/GM ointment Apply  topically Daily.   2018 at Unknown time   • epoetin chloe (EPOGEN,PROCRIT) 82559 UNIT/ML injection Inject 1 mL under the skin 1 (One) Time Per Week. Indications: Anemia associated with Chronic Kidney Failure   2018 at Unknown time   • gabapentin (NEURONTIN) 300 MG capsule Take 1 capsule by mouth Every 12 (Twelve) Hours.   2018 at Unknown time   • hydrALAZINE (APRESOLINE) 25 MG tablet Take 3 tablets by mouth Every 8 (Eight) Hours.   2018 at Unknown time   • HYDROcodone-acetaminophen (NORCO) 5-325 MG per tablet Take 1 tablet by mouth Every 4 (Four) Hours As Needed for Moderate Pain . 12 tablet 0 2018 at Unknown time   • hydrophor (AQUAPHOR) ointment ointment Apply  topically As Needed (Dry Skin).   2018 at Unknown time   • insulin detemir (LEVEMIR) 100 UNIT/ML injection Inject 25 Units under the skin Every Night.  12 2018 at Unknown time   • [] lactobacillus acidophilus (RISAQUAD) capsule capsule Take 1 capsule by mouth Daily for 10 days. 10 capsule 0 2018 at Unknown time   • metoprolol tartrate (LOPRESSOR) 50 MG tablet Take 1 tablet by mouth Every 12 (Twelve) Hours.   2018 at Unknown time   • pantoprazole (PROTONIX) 40 MG EC tablet Take 1 tablet by mouth 2 (Two) Times a Day Before Meals.   2018 at Unknown time   • risperiDONE (risperDAL) 2 MG tablet Take 2 mg by mouth Daily.   2018  at Unknown time   • sodium bicarbonate 650 MG tablet Take 1 tablet by mouth 3 (Three) Times a Day.   2/5/2018 at Unknown time   • tamsulosin (FLOMAX) 0.4 MG capsule 24 hr capsule Take 1 capsule by mouth Every Night. 30 capsule  2/4/2018 at Unknown time   • traZODone (DESYREL) 100 MG tablet Take 1 tablet by mouth Every Night.   2/4/2018 at Unknown time     Allergies:  Review of patient's allergies indicates no known allergies.    Review of Systems   Constitutional: Positive for fatigue. Negative for chills and fever.   HENT: Negative.    Eyes: Negative.    Respiratory: Negative.    Cardiovascular: Positive for leg swelling. Negative for chest pain and palpitations.   Gastrointestinal: Positive for abdominal pain, diarrhea, nausea and vomiting.   Endocrine: Negative.    Genitourinary: Negative.    Musculoskeletal: Negative.    Skin: Negative.    Neurological: Negative.    Hematological: Negative.    Psychiatric/Behavioral: Negative.        Objective      Vital Signs  Temp:  [97.5 °F (36.4 °C)-97.9 °F (36.6 °C)] 97.8 °F (36.6 °C)  Heart Rate:  [71-82] 71  Resp:  [16-20] 16  BP: (144-158)/(77-90) 144/77  There is no height or weight on file to calculate BMI.    Physical Exam   Constitutional:   Looks disheveled and unkempt, chronically-ill   HENT:   Head: Normocephalic and atraumatic.   Mouth/Throat: No oropharyngeal exudate.   Eyes: Conjunctivae are normal. No scleral icterus.   Neck: Normal range of motion. No JVD present. No tracheal deviation present.   Cardiovascular: Normal rate and regular rhythm.    No murmur heard.  Pulmonary/Chest: Effort normal.   Decreased breath sounds b/l   Abdominal: Soft. Bowel sounds are normal. He exhibits no distension. There is no tenderness.   Musculoskeletal: He exhibits edema. He exhibits no deformity.   Neurological:   Lethargic, oriented x2   Skin: Skin is warm and dry.   Psychiatric:   Lethargic but appropriate       Results Review:    I reviewed the patient's new clinical  results.    1. Iron deficiency anemia  - appears he had Upper endoscopy during admission to [place] in 01/2018 but I cannot pull up report  - will transfuse 1u pRBCs  - monitor H/H q8h. This could be combination of ABLA from previous duodenal erosions plus his CKD  - GI consult  - I will place on protonix drip until we figure out where blood loss is coming form    2. LE Swelling  - likely due to hypoalbuminemia from proteinuria  - on lasix, defer to renal  - will check LE venous duplex    3. DM2  - on levemir and SSI  - NPO until GI sees him    4. Acute hypercapneic and hypoxic resp failure  - ABG shows resp acidosis  - place on BiPAP  - Pulm consult  - CXR    5. HTN  - home meds re-ordered, defer to renal for any changes    6. SOHAM on CKD4 vs progression of CKD  - renal managing    Thank you for this consult, we will continue to follow along with you.

## 2018-02-07 ENCOUNTER — APPOINTMENT (OUTPATIENT)
Dept: CARDIOLOGY | Facility: HOSPITAL | Age: 68
End: 2018-02-07
Attending: SURGERY

## 2018-02-07 LAB
ABO + RH BLD: NORMAL
ALBUMIN SERPL-MCNC: 2.2 G/DL (ref 3.5–5.2)
ANION GAP SERPL CALCULATED.3IONS-SCNC: 12.7 MMOL/L
BH BB BLOOD EXPIRATION DATE: NORMAL
BH BB BLOOD TYPE BARCODE: 6200
BH BB DISPENSE STATUS: NORMAL
BH BB PRODUCT CODE: NORMAL
BH BB UNIT NUMBER: NORMAL
BH CV LOWER ARTERIAL LEFT ABI RATIO: 1.3
BH CV LOWER ARTERIAL LEFT DORSALIS PEDIS SYS MAX: 218 MMHG
BH CV LOWER ARTERIAL LEFT GREAT TOE SYS MAX: 222 MMHG
BH CV LOWER ARTERIAL LEFT HIGH THIGH SYS MAX: 226 MMHG
BH CV LOWER ARTERIAL LEFT LOW THIGH SYS MAX: 227 MMHG
BH CV LOWER ARTERIAL LEFT POPLITEAL SYS MAX: 222 MMHG
BH CV LOWER ARTERIAL LEFT POST TIBIAL SYS MAX: 220 MMHG
BH CV LOWER ARTERIAL LEFT TBI RATIO: 1.31
BH CV LOWER ARTERIAL RIGHT ABI RATIO: 1.3
BH CV LOWER ARTERIAL RIGHT DORSALIS PEDIS SYS MAX: 217 MMHG
BH CV LOWER ARTERIAL RIGHT GREAT TOE SYS MAX: 190 MMHG
BH CV LOWER ARTERIAL RIGHT LOW THIGH SYS MAX: 237 MMHG
BH CV LOWER ARTERIAL RIGHT POPLITEAL SYS MAX: 224 MMHG
BH CV LOWER ARTERIAL RIGHT POST TIBIAL SYS MAX: 220 MMHG
BH CV LOWER ARTERIAL RIGHT TBI RATIO: 1.12
BUN BLD-MCNC: 29 MG/DL (ref 8–23)
BUN/CREAT SERPL: 12.1 (ref 7–25)
CALCIUM SPEC-SCNC: 8 MG/DL (ref 8.6–10.5)
CHLORIDE SERPL-SCNC: 102 MMOL/L (ref 98–107)
CO2 SERPL-SCNC: 24.3 MMOL/L (ref 22–29)
CREAT BLD-MCNC: 2.4 MG/DL (ref 0.76–1.27)
GFR SERPL CREATININE-BSD FRML MDRD: 27 ML/MIN/1.73
GLUCOSE BLD-MCNC: 98 MG/DL (ref 65–99)
GLUCOSE BLDC GLUCOMTR-MCNC: 102 MG/DL (ref 70–130)
GLUCOSE BLDC GLUCOMTR-MCNC: 111 MG/DL (ref 70–130)
GLUCOSE BLDC GLUCOMTR-MCNC: 323 MG/DL (ref 70–130)
GLUCOSE BLDC GLUCOMTR-MCNC: 345 MG/DL (ref 70–130)
GLUCOSE BLDC GLUCOMTR-MCNC: 435 MG/DL (ref 70–130)
GLUCOSE BLDC GLUCOMTR-MCNC: 476 MG/DL (ref 70–130)
HCT VFR BLD AUTO: 26.9 % (ref 40.4–52.2)
HGB BLD-MCNC: 8.2 G/DL (ref 13.7–17.6)
PHOSPHATE SERPL-MCNC: 3.8 MG/DL (ref 2.5–4.5)
POTASSIUM BLD-SCNC: 3.9 MMOL/L (ref 3.5–5.2)
SODIUM BLD-SCNC: 139 MMOL/L (ref 136–145)
UNIT  ABO: NORMAL
UNIT  RH: NORMAL
UPPER ARTERIAL LEFT ARM BRACHIAL SYS MAX: 167 MMHG
UPPER ARTERIAL RIGHT ARM BRACHIAL SYS MAX: 169 MMHG

## 2018-02-07 PROCEDURE — 25010000002 IRON SUCROSE PER 1 MG: Performed by: INTERNAL MEDICINE

## 2018-02-07 PROCEDURE — 94799 UNLISTED PULMONARY SVC/PX: CPT

## 2018-02-07 PROCEDURE — 99231 SBSQ HOSP IP/OBS SF/LOW 25: CPT

## 2018-02-07 PROCEDURE — 63710000001 INSULIN DETEMER PER 5 UNITS: Performed by: INTERNAL MEDICINE

## 2018-02-07 PROCEDURE — 97530 THERAPEUTIC ACTIVITIES: CPT | Performed by: PHYSICAL THERAPIST

## 2018-02-07 PROCEDURE — 97116 GAIT TRAINING THERAPY: CPT | Performed by: PHYSICAL THERAPIST

## 2018-02-07 PROCEDURE — 80069 RENAL FUNCTION PANEL: CPT | Performed by: INTERNAL MEDICINE

## 2018-02-07 PROCEDURE — 82962 GLUCOSE BLOOD TEST: CPT

## 2018-02-07 PROCEDURE — 63710000001 INSULIN ASPART PER 5 UNITS: Performed by: INTERNAL MEDICINE

## 2018-02-07 PROCEDURE — 85014 HEMATOCRIT: CPT | Performed by: INTERNAL MEDICINE

## 2018-02-07 PROCEDURE — 63710000001 INSULIN ASPART PER 5 UNITS: Performed by: HOSPITALIST

## 2018-02-07 PROCEDURE — 97161 PT EVAL LOW COMPLEX 20 MIN: CPT | Performed by: PHYSICAL THERAPIST

## 2018-02-07 PROCEDURE — 85018 HEMOGLOBIN: CPT | Performed by: INTERNAL MEDICINE

## 2018-02-07 PROCEDURE — G0365 VESSEL MAPPING HEMO ACCESS: HCPCS

## 2018-02-07 PROCEDURE — 93923 UPR/LXTR ART STDY 3+ LVLS: CPT

## 2018-02-07 RX ORDER — CLONIDINE HYDROCHLORIDE 0.1 MG/1
0.2 TABLET ORAL EVERY 12 HOURS SCHEDULED
Status: DISCONTINUED | OUTPATIENT
Start: 2018-02-07 | End: 2018-02-13 | Stop reason: HOSPADM

## 2018-02-07 RX ORDER — HYDRALAZINE HYDROCHLORIDE 50 MG/1
50 TABLET, FILM COATED ORAL EVERY 8 HOURS SCHEDULED
Status: DISCONTINUED | OUTPATIENT
Start: 2018-02-07 | End: 2018-02-09

## 2018-02-07 RX ORDER — PANTOPRAZOLE SODIUM 40 MG/1
40 TABLET, DELAYED RELEASE ORAL
Status: DISCONTINUED | OUTPATIENT
Start: 2018-02-07 | End: 2018-02-13 | Stop reason: HOSPADM

## 2018-02-07 RX ADMIN — TRAZODONE HYDROCHLORIDE 100 MG: 100 TABLET, FILM COATED ORAL at 20:29

## 2018-02-07 RX ADMIN — HYDROCODONE BITARTRATE AND ACETAMINOPHEN 1 TABLET: 5; 325 TABLET ORAL at 16:22

## 2018-02-07 RX ADMIN — SODIUM BICARBONATE 650 MG: 650 TABLET ORAL at 16:18

## 2018-02-07 RX ADMIN — IPRATROPIUM BROMIDE AND ALBUTEROL SULFATE 3 ML: .5; 3 SOLUTION RESPIRATORY (INHALATION) at 14:19

## 2018-02-07 RX ADMIN — BUPROPION HYDROCHLORIDE 200 MG: 100 TABLET, EXTENDED RELEASE ORAL at 21:28

## 2018-02-07 RX ADMIN — GABAPENTIN 300 MG: 300 CAPSULE ORAL at 20:28

## 2018-02-07 RX ADMIN — METOPROLOL TARTRATE 50 MG: 50 TABLET, FILM COATED ORAL at 04:05

## 2018-02-07 RX ADMIN — TAMSULOSIN HYDROCHLORIDE 0.4 MG: 0.4 CAPSULE ORAL at 20:29

## 2018-02-07 RX ADMIN — ARFORMOTEROL TARTRATE 15 MCG: 15 SOLUTION RESPIRATORY (INHALATION) at 20:53

## 2018-02-07 RX ADMIN — ASPIRIN 81 MG: 81 TABLET, CHEWABLE ORAL at 10:43

## 2018-02-07 RX ADMIN — AMLODIPINE BESYLATE 10 MG: 10 TABLET ORAL at 10:42

## 2018-02-07 RX ADMIN — SODIUM CHLORIDE 8 MG/HR: 9 INJECTION, SOLUTION INTRAVENOUS at 04:08

## 2018-02-07 RX ADMIN — HYDRALAZINE HYDROCHLORIDE 50 MG: 50 TABLET, FILM COATED ORAL at 14:10

## 2018-02-07 RX ADMIN — HYDRALAZINE HYDROCHLORIDE 25 MG: 25 TABLET, FILM COATED ORAL at 04:05

## 2018-02-07 RX ADMIN — HYDROCODONE BITARTRATE AND ACETAMINOPHEN 1 TABLET: 5; 325 TABLET ORAL at 12:05

## 2018-02-07 RX ADMIN — ARFORMOTEROL TARTRATE 15 MCG: 15 SOLUTION RESPIRATORY (INHALATION) at 11:52

## 2018-02-07 RX ADMIN — IRON SUCROSE 200 MG: 20 INJECTION, SOLUTION INTRAVENOUS at 16:18

## 2018-02-07 RX ADMIN — CITALOPRAM 20 MG: 20 TABLET, FILM COATED ORAL at 10:44

## 2018-02-07 RX ADMIN — METOPROLOL TARTRATE 50 MG: 50 TABLET, FILM COATED ORAL at 10:44

## 2018-02-07 RX ADMIN — SODIUM BICARBONATE 650 MG: 650 TABLET ORAL at 10:45

## 2018-02-07 RX ADMIN — GABAPENTIN 300 MG: 300 CAPSULE ORAL at 10:44

## 2018-02-07 RX ADMIN — BUDESONIDE 0.25 MG: 0.25 INHALANT RESPIRATORY (INHALATION) at 11:53

## 2018-02-07 RX ADMIN — BUPROPION HYDROCHLORIDE 200 MG: 100 TABLET, EXTENDED RELEASE ORAL at 10:43

## 2018-02-07 RX ADMIN — PANTOPRAZOLE SODIUM 40 MG: 40 TABLET, DELAYED RELEASE ORAL at 16:51

## 2018-02-07 RX ADMIN — INSULIN ASPART 7 UNITS: 100 INJECTION, SOLUTION INTRAVENOUS; SUBCUTANEOUS at 17:39

## 2018-02-07 RX ADMIN — CLONIDINE HYDROCHLORIDE 0.2 MG: 0.1 TABLET ORAL at 20:28

## 2018-02-07 RX ADMIN — INSULIN ASPART 15 UNITS: 100 INJECTION, SOLUTION INTRAVENOUS; SUBCUTANEOUS at 21:29

## 2018-02-07 RX ADMIN — HYDROCODONE BITARTRATE AND ACETAMINOPHEN 1 TABLET: 5; 325 TABLET ORAL at 20:27

## 2018-02-07 RX ADMIN — INSULIN DETEMIR 30 UNITS: 100 INJECTION, SOLUTION SUBCUTANEOUS at 21:40

## 2018-02-07 RX ADMIN — VITAMIN D, TAB 1000IU (100/BT) 1000 UNITS: 25 TAB at 10:43

## 2018-02-07 RX ADMIN — SODIUM BICARBONATE 650 MG: 650 TABLET ORAL at 20:29

## 2018-02-07 RX ADMIN — BUDESONIDE 0.25 MG: 0.25 INHALANT RESPIRATORY (INHALATION) at 20:53

## 2018-02-07 RX ADMIN — HYDROCODONE BITARTRATE AND ACETAMINOPHEN 1 TABLET: 5; 325 TABLET ORAL at 04:06

## 2018-02-07 RX ADMIN — GABAPENTIN 300 MG: 300 CAPSULE ORAL at 04:05

## 2018-02-07 RX ADMIN — CLONIDINE HYDROCHLORIDE 0.1 MG: 0.1 TABLET ORAL at 04:04

## 2018-02-07 RX ADMIN — CLONIDINE HYDROCHLORIDE 0.1 MG: 0.1 TABLET ORAL at 10:44

## 2018-02-07 RX ADMIN — METOPROLOL TARTRATE 50 MG: 50 TABLET, FILM COATED ORAL at 20:28

## 2018-02-07 RX ADMIN — RISPERIDONE 2 MG: 1 TABLET ORAL at 20:29

## 2018-02-07 RX ADMIN — HYDRALAZINE HYDROCHLORIDE 50 MG: 50 TABLET, FILM COATED ORAL at 21:28

## 2018-02-07 NOTE — PROGRESS NOTES
Discharge Planning Assessment  [facility]     Patient Name: Kevin Mckeon V  MRN: [id number]  Today's Date: 2/7/2018    Admit Date: 2/5/2018          Discharge Needs Assessment       02/07/18 1659    Living Environment    Lives With facility resident    Living Arrangements extended care facility    Transportation Available car            Discharge Plan       02/07/18 1702    Case Management/Social Work Plan    Additional Comments IMM 2-5. Patient is from [facility]. He is receiving skilled care . Facility is willing to readmit him as long as patient still requires skilled care Patient will require Precert.  patient plans to return to [facility] at discharge.. He was living with his son, Dick prior to  placement . They shared a house with 14 steps to enter from the outside and 12 steps once inside. CCP to follow and will assist  with transfer when indicated.       02/07/18 1659    Case Management/Social Work Plan    Plan [facility] vs Home        Discharge Placement     Facility/Agency Request Status Selected? Address Phone Number Fax Number    [facility] Pending - Request Sent     [address] [place] [id number] [phone]                Demographic Summary       02/07/18 1657    Referral Information    Admission Type inpatient    Referral Source admission list    Reason For Consult discharge planning    Primary Care Physician Information    Name Alicia [facility] MD            Functional Status       02/07/18 1658    Functional Status Current    Ambulation 3-->assistive equipment and person    Transferring 3-->assistive equipment and person    Toileting 3-->assistive equipment and person    Bathing 3-->assistive equipment and person    Dressing 3-->assistive equipment and person    Eating 0-->independent    Communication 0-->understands/communicates without difficulty    Functional Status Prior    Ambulation 1-->assistive equipment     Transferring 2-->assistive person    Toileting 1-->assistive equipment    Bathing 2-->assistive person    Dressing 2-->assistive person    Eating 0-->independent    IADL    IADL Comments Patient a resident at [facility]     None            Abuse/Neglect     None            Legal     None            Substance Abuse     None            Patient Forms     None          LEILANI Parks

## 2018-02-07 NOTE — PLAN OF CARE
Problem: Patient Care Overview (Adult)  Goal: Plan of Care Review  Outcome: Ongoing (interventions implemented as appropriate)   02/07/18 0834   Coping/Psychosocial Response Interventions   Plan Of Care Reviewed With patient   Outcome Evaluation   Outcome Summary/Follow up Plan PT EVAL completed. Pt transferred supine to sit with min x 1 and use of bed rail. Pt performed sit to stand with CGA x 1 and RW. Pt ambulated 30 ft with CGA x 1 and RW. Pt had heel wound that was painful while standing and ambulating that limited his step length. Pt has some balance deficitis with turning durnig ambulation. Pt requires skilled therapy due to balance deficits and decreased ambulation and strength. Recommend D/C to SNF       Problem: Inpatient Physical Therapy  Goal: Bed Mobility Goal LTG- PT  Outcome: Ongoing (interventions implemented as appropriate)   02/07/18 0834   Bed Mobility PT LTG   Bed Mobility PT LTG, Date Established 02/07/18   Bed Mobility PT LTG, Time to Achieve 1 wk   Bed Mobility PT LTG, Activity Type all bed mobility   Bed Mobility PT LTG, [place] Level supervision required   Bed Mobility PT Goal LTG, Assist Device bed rails     Goal: Transfer Training Goal 1 LTG- PT  Outcome: Ongoing (interventions implemented as appropriate)   02/07/18 0834   Transfer Training PT LTG   Transfer Training PT LTG, Date Established 02/07/18   Transfer Training PT LTG, Time to Achieve 1 wk   Transfer Training PT LTG, Activity Type all transfers   Transfer Training PT LTG, [place] Level supervision required   Transfer Training PT LTG, Assist Device walker, rolling     Goal: Gait Training Goal LTG- PT  Outcome: Ongoing (interventions implemented as appropriate)   02/07/18 0834   Gait Training PT LTG   Gait Training Goal PT LTG, Date Established 02/07/18   Gait Training Goal PT LTG, Time to Achieve 1 wk   Gait Training Goal PT LTG, [place] Level supervision required   Gait Training Goal PT LTG, Assist Device walker,  rolling   Gait Training Goal PT LTG, Distance to Achieve 200

## 2018-02-07 NOTE — CONSULTS
Patient Name: Kevin Mckeon V Account #: [id number]    MRN: [id number] Admission Date: 2/5/2018      Consulting Service: Vascular Surgery Date of Evaluation: February 6, 2018    Requesting Provider: Dr annie Joaquin MD    CHIEF COMPLAINT: Renal failure   HPI: Kevin Mckeon V is a 67 y.o. male is being seen for a consultation and evaluation/management of acute renal failure with the need for hemodialysis access.  Patient is an acute failure but has known chronic kidney disease.  He also has cellulitis and lower extremity was peripheral vascular disease based on exam.  He lost some toes in the past and is in need of hemodialysis right now with his worsening respiratory status and volume overload.  He is on BiPAP has an unkempt Beard that is an acute bus from placing a line in his neck due to the strap from the bypass and the ability to get at the area of the base of his neck.  We will place a femoral Shiley to temporize him and get him off BiPAP and then he'll need a tunnel dialysis catheter placed when she stabilized.  He will also need lower extremity arterial testing.  He has been worked on at the wound care center at [facility] multiple times.    PAST MEDICAL HISTORY:   Past Medical History:   Diagnosis Date   • COPD (chronic obstructive pulmonary disease)    • Diabetes mellitus    • Hypertension       PAST SURGICAL HISTORY:   Past Surgical History:   Procedure Laterality Date   • AMPUTATION FOOT / TOE  two years ago    5th toe on right foot   • ENDOSCOPY N/A 1/19/2018    Procedure: ESOPHAGOGASTRODUODENOSCOPY;  Surgeon: Danilo Hensley MD;  Location: [facility] OR;  Service:    • HERNIA REPAIR     • LEG DEBRIDEMENT Bilateral 1/21/2018    Procedure: LOWER EXTREMITY DEBRIDEMENT 8:00;  Surgeon: Kaylyn Tillman DO;  Location: [facility] OR;  Service:       FAMILY HISTORY:   Family History   Problem Relation Age of Onset   • Heart disease Father       SOCIAL HISTORY:   Social History   Substance Use  Topics   • Smoking status: Former Smoker     Packs/day: 3.00     Years: 37.00     Types: Cigarettes     Quit date:    • Smokeless tobacco: Current User     Types: Chew   • Alcohol use 1.2 oz/week     2 Glasses of wine per week      Comment: rarely      MEDICATIONS:   No current facility-administered medications on file prior to encounter.      Current Outpatient Prescriptions on File Prior to Encounter   Medication Sig Dispense Refill   • acetaminophen (TYLENOL) 325 MG tablet Take 650 mg by mouth Every 6 (Six) Hours As Needed for Mild Pain .     • amLODIPine (NORVASC) 10 MG tablet Take 1 tablet by mouth Daily.     • aspirin 81 MG EC tablet Take 81 mg by mouth Daily.     • buPROPion SR (WELLBUTRIN SR) 200 MG 12 hr tablet Take 1 tablet by mouth Daily.     • Cholecalciferol 1000 units capsule Take 1,000 Units by mouth Daily.     • citalopram (CeleXA) 20 MG tablet Take 20 mg by mouth Daily.     • CloNIDine (CATAPRES) 0.1 MG tablet Take 0.1 mg by mouth 2 (Two) Times a Day.     • collagenase 250 UNIT/GM ointment Apply  topically Daily.     • epoetin chloe (EPOGEN,PROCRIT) 71785 UNIT/ML injection Inject 1 mL under the skin 1 (One) Time Per Week. Indications: Anemia associated with Chronic Kidney Failure     • gabapentin (NEURONTIN) 300 MG capsule Take 1 capsule by mouth Every 12 (Twelve) Hours.     • hydrALAZINE (APRESOLINE) 25 MG tablet Take 3 tablets by mouth Every 8 (Eight) Hours.     • HYDROcodone-acetaminophen (NORCO) 5-325 MG per tablet Take 1 tablet by mouth Every 4 (Four) Hours As Needed for Moderate Pain . 12 tablet 0   • hydrophor (AQUAPHOR) ointment ointment Apply  topically As Needed (Dry Skin).     • insulin detemir (LEVEMIR) 100 UNIT/ML injection Inject 25 Units under the skin Every Night.  12   • [] lactobacillus acidophilus (RISAQUAD) capsule capsule Take 1 capsule by mouth Daily for 10 days. 10 capsule 0   • metoprolol tartrate (LOPRESSOR) 50 MG tablet Take 1 tablet by mouth Every 12 (Twelve)  "Hours.     • pantoprazole (PROTONIX) 40 MG EC tablet Take 1 tablet by mouth 2 (Two) Times a Day Before Meals.     • risperiDONE (risperDAL) 2 MG tablet Take 2 mg by mouth Daily.     • sodium bicarbonate 650 MG tablet Take 1 tablet by mouth 3 (Three) Times a Day.     • tamsulosin (FLOMAX) 0.4 MG capsule 24 hr capsule Take 1 capsule by mouth Every Night. 30 capsule    • traZODone (DESYREL) 100 MG tablet Take 1 tablet by mouth Every Night.               ALLERGIES: Review of patient's allergies indicates no known allergies.   COMPLETE REVIEW OF SYSTEMS:     ENT ROS: negative  Cardiovascular ROS: no chest pain or dyspnea on exertion  Respiratory ROS positive for shortness of air  Gastrointestinal ROS: no abdominal pain, change in bowel habits, or black or bloody stools  Neurological ROS: no TIA or stroke symptoms  Genito-Urinary ROS: no dysuria, trouble voiding, or hematuria  Musculoskeletal ROS: positive for - pain in the legs  Dermatological ROS: negative  Psychological ROS: negative      PHYSICAL EXAM:   Patient Vitals for the past 24 hrs:   BP Temp Temp src Pulse Resp SpO2 Height Weight   02/06/18 2100 - - - - 25 - - -   02/06/18 2014 164/91 98 °F (36.7 °C) Oral 68 24 90 % - -   02/06/18 2000 163/88 98.8 °F (37.1 °C) Oral 67 18 97 % - -   02/06/18 1648 122/64 98 °F (36.7 °C) Oral 61 22 94 % - -   02/06/18 1637 132/74 97.4 °F (36.3 °C) - 61 22 95 % - -   02/06/18 1622 - - - 62 22 95 % - -   02/06/18 1606 - 97.8 °F (36.6 °C) Oral 63 16 - - -   02/06/18 1409 - - - - - - 182.9 cm (72.01\") 83.7 kg (184 lb 8.4 oz)   02/06/18 1403 126/71 97.6 °F (36.4 °C) Oral - 16 - - -   02/06/18 1235 - - - 62 23 95 % - -   02/06/18 1126 132/75 98.6 °F (37 °C) Oral - 16 - - -   02/06/18 0941 149/74 - - - - - - -   02/06/18 0842 136/83 98.2 °F (36.8 °C) Oral - 16 - - -   02/06/18 0831 - - - 72 16 93 % - -   02/06/18 0400 144/77 97.8 °F (36.6 °C) Oral 71 16 - - -   02/06/18 0038 158/86 97.9 °F (36.6 °C) Oral 82 18 - - -        General " appearance: alert, well appearing, and in no distress.  Neurological exam reveals alert, oriented, normal speech, no focal findings or movement disorder noted.  ENT exam reveals - ENT exam normal, no neck nodes or sinus tenderness.  CVS exam: normal rate, regular rhythm, normal S1, S2, no murmurs, rubs, clicks or gallops.  Chest: clear to auscultation, no wheezes, rales or rhonchi, symmetric air entry.  Abdominal exam: soft, nontender, nondistended, no masses or organomegaly.  Examination of the feet reveals Ulcers on both lower extremities with possible cellulitis to the lower extremity skin.  Pulses are difficult to palpate in both extremities.  He has +1 femorals nonpalpable popliteal or pedal pulses.  His radial pulses are +1 as well.  He has no carotid bruits      LABS:      Results Review:       I reviewed the patient's new clinical results.    Results from last 7 days  Lab Units 02/06/18  1402 02/06/18  0440   WBC 10*3/mm3  --  7.48   HEMOGLOBIN g/dL 6.8* 6.9*   PLATELETS 10*3/mm3  --  266     Results from last 7 days  Lab Units 02/06/18  0440   SODIUM mmol/L 135*   POTASSIUM mmol/L 4.9   CHLORIDE mmol/L 100   CO2 mmol/L 22.6   BUN mg/dL 63*   CREATININE mg/dL 3.85*   GLUCOSE mg/dL 258*   Estimated Creatinine Clearance: 22.6 mL/min (by C-G formula based on Cr of 3.85).  Results from last 7 days  Lab Units 02/06/18  0440   CALCIUM mg/dL 8.3*   MAGNESIUM mg/dL 2.1   PHOSPHORUS mg/dL 6.7*           The following radiologic or non-invasive studies have been reviewed by me: Lower extremity venous negative    Active Hospital Problems (** Indicates Principal Problem)    Diagnosis Date Noted   • Acute renal failure (ARF) [N17.9] 02/06/2018      Resolved Hospital Problems    Diagnosis Date Noted Date Resolved   No resolved problems to display.         ASSESSMENT/PLAN: 67 y.o. male with With acute renal failure and known lower extremity wounds with cellulitis and possible significant peripheral vascular disease.  We'll  order a hemodialysis mapping and lower extremity arterial testing.  We'll place a femoral Shiley at this time.  He'll need better access once he is stabilized.      I discussed the plan with the patient and he is agreeable to the plan of care at this point. Thank you for this consult.     Nitin Coronel MD   02/06/18

## 2018-02-07 NOTE — PROGRESS NOTES
"   LOS: 2 days   Patient Care Team:  Alicia Harley MD as PCP - General (Family Medicine)    Chief Complaint/ Reason for encounter: Acute renal failure, dialysis management  No chief complaint on file.        Subjective     History of Present Illness    Subjective:  Symptoms:  Improved.  He reports weakness.  No shortness of breath or chest pain.  (He feels much better today  Significantly more awake and alert  He has not required BiPAP since yesterday, on CPAP at night for sleep apnea  Tolerated dialysis very well last night, decreased oxygen requirements with ultrafiltration).    Diet:  Adequate intake.  No nausea.    Activity level: Returning to normal.    Pain:  He reports no pain.          History taken from: Patient and chart    Objective     Vital Signs  Temp:  [97.4 °F (36.3 °C)-98.8 °F (37.1 °C)] 98.4 °F (36.9 °C)  Heart Rate:  [61-76] 73  Resp:  [16-25] 18  BP: (122-180)/() 173/91       Wt Readings from Last 1 Encounters:   02/06/18 1409 83.7 kg (184 lb 8.4 oz)       Objective:  General Appearance:  Comfortable, ill-appearing, in no acute distress and not in pain (More alert and responsive).    Vital signs: (most recent): Blood pressure 173/91, pulse 73, temperature 98.4 °F (36.9 °C), temperature source Oral, resp. rate 18, height 182.9 cm (72.01\"), weight 83.7 kg (184 lb 8.4 oz), SpO2 95 %.  Vital signs are normal.  No fever.    Output: Producing urine.    HEENT: Normal HEENT exam.    Lungs:  Normal respiratory rate and normal effort.  He is not in respiratory distress.  Breath sounds clear to auscultation.  There are decreased breath sounds.  No rhonchi.  (Improved aeration)  Heart: Normal rate.  Regular rhythm.  No murmur.   Abdomen: Abdomen is soft and non-distended.  Bowel sounds are normal.   There is no abdominal tenderness.  There is no epigastric area or no suprapubic area tenderness.     Extremities: Normal range of motion.  There is dependent edema.  There is no deformity.  " (Decreased edema)  Pulses: Distal pulses are intact.    Skin:  Warm and dry.  No rash or cyanosis.             Results Review:    Past Medical History: reviewed and updated  Past Medical History:   Diagnosis Date   • COPD (chronic obstructive pulmonary disease)    • Diabetes mellitus    • Hypertension          Allergies:  No Known Allergies    Intake/Output:     Intake/Output Summary (Last 24 hours) at 02/07/18 1538  Last data filed at 02/07/18 1458   Gross per 24 hour   Intake              660 ml   Output             7550 ml   Net            -6890 ml         DATA:  Radiology and Labs:  The following labs independently reviewed by me  Old records independently reviewed showing Progressive chronic kidney disease, progression to end-stage renal disease  The following radiologic studies independently reviewed by me, findings chest x-ray with vascular congestion/fluid overload  Interval notes, chart personally reviewed by me.   New problems include worsening hypertension  Discussed with Dr. Orellana, patient             Labs:   Recent Results (from the past 24 hour(s))   Duplex Venous Lower Extremity - Bilateral CAR    Collection Time: 02/06/18  4:17 PM   Result Value Ref Range    Right Common Femoral Spont Y     Right Common Femoral Phasic Y     Right Common Femoral Augment Y     Right Common Femoral Competent Y     Right Common Femoral Compress C     Right Saphenofemoral Junction Spont Y     Right Saphenofemoral Junction Phasic Y     Right Saphenofemoral Junction Augment Y     Right Saphenofemoral Junction Compress C     Right Proximal Femoral Compress C     Right Mid Femoral Spont Y     Right Mid Femoral Phasic Y     Right Mid Femoral Augment Y     Right Mid Femoral Competent Y     Right Mid Femoral Compress C     Right Distal Femoral Compress C     Right Popliteal Spont Y     Right Popliteal Phasic Y     Right Popliteal Augment Y     Right Popliteal Competent Y     Right Popliteal Compress C     Right Posterior Tibial  Compress C     Right Peroneal Compress C     Right GastronemiusSoleal Compress C     Right Greater Saph AK Compress C     Right Greater Saph BK Compress C     Left Common Femoral Spont Y     Left Common Femoral Phasic Y     Left Common Femoral Augment Y     Left Common Femoral Competent Y     Left Common Femoral Compress C     Left Saphenofemoral Junction Spont Y     Left Saphenofemoral Junction Phasic Y     Left Saphenofemoral Junction Augment Y     Left Saphenofemoral Junction Compress C     Left Proximal Femoral Compress C     Left Mid Femoral Spont Y     Left Mid Femoral Phasic Y     Left Mid Femoral Augment Y     Left Mid Femoral Competent Y     Left Mid Femoral Compress C     Left Distal Femoral Compress C     Left Popliteal Spont Y     Left Popliteal Phasic Y     Left Popliteal Augment Y     Left Popliteal Competent Y     Left Popliteal Compress C     Left Posterior Tibial Compress C     Left Peroneal Compress C     Left GastronemiusSoleal Compress C     Left Greater Saph AK Compress C     Left Greater Saph BK Compress C    Hepatitis B Surface Antigen    Collection Time: 02/06/18  4:56 PM   Result Value Ref Range    Hepatitis B Surface Ag Non-Reactive Non-Reactive   POC Glucose Once    Collection Time: 02/06/18  5:23 PM   Result Value Ref Range    Glucose 84 70 - 130 mg/dL   Blood Gas, Arterial    Collection Time: 02/06/18  7:46 PM   Result Value Ref Range    Site Arterial: right radial     Loi's Test Positive     pH, Arterial 7.385 7.350 - 7.450 pH units    pCO2, Arterial 42.1 35.0 - 45.0 mm Hg    pO2, Arterial 71.8 (L) 80.0 - 100.0 mm Hg    HCO3, Arterial 25.2 22.0 - 28.0 mmol/L    Base Excess, Arterial 0.1 0.0 - 2.0 mmol/L    O2 Saturation Calculated 93.9 92.0 - 99.0 %    A-a Gradiant 0.3 mmHg    Barometric Pressure for Blood Gas 758.6 mmHg    Modality BiPap     FIO2 35 %    Set [facility] Resp Rate 20     Rate 22 Breaths/minute   POC Glucose Once    Collection Time: 02/06/18  8:37 PM   Result Value Ref Range     Glucose 89 70 - 130 mg/dL   Hemoglobin & Hematocrit, Blood    Collection Time: 02/06/18 10:12 PM   Result Value Ref Range    Hemoglobin 8.2 (L) 13.7 - 17.6 g/dL    Hematocrit 26.5 (L) 40.4 - 52.2 %   Hemoglobin & Hematocrit, Blood    Collection Time: 02/07/18  5:58 AM   Result Value Ref Range    Hemoglobin 8.2 (L) 13.7 - 17.6 g/dL    Hematocrit 26.9 (L) 40.4 - 52.2 %   Renal Function Panel    Collection Time: 02/07/18  5:58 AM   Result Value Ref Range    Glucose 98 65 - 99 mg/dL    BUN 29 (H) 8 - 23 mg/dL    Creatinine 2.40 (H) 0.76 - 1.27 mg/dL    Sodium 139 136 - 145 mmol/L    Potassium 3.9 3.5 - 5.2 mmol/L    Chloride 102 98 - 107 mmol/L    CO2 24.3 22.0 - 29.0 mmol/L    Calcium 8.0 (L) 8.6 - 10.5 mg/dL    Albumin 2.20 (L) 3.50 - 5.20 g/dL    Phosphorus 3.8 2.5 - 4.5 mg/dL    Anion Gap 12.7 mmol/L    BUN/Creatinine Ratio 12.1 7.0 - 25.0    eGFR Non African Amer 27 (L) >60 mL/min/1.73   Prepare RBC, 1 Units    Collection Time: 02/07/18  6:00 AM   Result Value Ref Range    Product Code T5163G46     Unit Number [id number]     UNIT  ABO A     UNIT  RH POS     Dispense Status PT     Blood Type APOS     Blood Expiration Date [id number]     Blood Type Barcode 6200    POC Glucose Once    Collection Time: 02/07/18  7:57 AM   Result Value Ref Range    Glucose 102 70 - 130 mg/dL   POC Glucose Once    Collection Time: 02/07/18 11:16 AM   Result Value Ref Range    Glucose 111 70 - 130 mg/dL   Duplex Initial Vein Mapping Hemodialysis Access CAR    Collection Time: 02/07/18 11:20 AM   Result Value Ref Range    Cephalic upper arm right - prox 0.21 cm    Cephalic upper arm right - mid 0.20 cm    Cephalic upper arm right - dist 0.20 cm    Cephalic Forearm right - prox 0.29 cm    Cephalic Forearm right - mid 0.24 cm    Cephalic Forearm right - dist 0.23 cm    Basilic upper arm right - prox 0.50 cm    Basilic upper arm right - mid 0.37 cm    Basilic upper arm right - dist 0.38 cm    Basilic Forearm right - prox 0.24 cm     Basilic Forearm right - mid 0.20 cm    Basilic Forearm right - dist 0.32 cm    Radial upper arm right - prox 0.29 cm    Radial upper arm right - mid 0.26 cm    Radial upper arm right - dist 0.25 cm    Cephalic upper arm left - prox 0.17 cm    Cephalic upper arm left - mid 0.12 cm    Cephalic upper arm left - dist 0.13 cm    Cephalic Forearm left - prox 0.18 cm    Cephalic Forearm left - mid 0.22 cm    Basilic upper arm left - prox 0.37 cm    Basilic upper arm left - mid 0.34 cm    Basilic upper arm left - dist 0.28 cm    Basilic Forearm left - prox 0.21 cm    Basilic Forearm left - mid 0.18 cm    Basilic Forearm left - dist 0.16 cm    Radial upper arm left - prox 0.27 cm    Radial upper arm left - mid 0.19 cm    Radial upper arm left - dist 0.25 cm    Upper arterial right arm brachial length 0.61 cm    Upper arterial left arm brachial length 0.53 cm    Basilic Antecubital Fossa Left 0.20 cm    Basilic Antecubital Fossa Right 0.27 cm    Cephalic Antecubital Fossa Left 0.22 cm    Cephalic Antecubital Fossa Right 0.28 cm   Doppler Arterial Multi Level Lower Extremity - Bilateral CAR    Collection Time: 02/07/18 11:29 AM   Result Value Ref Range    RIGHT LOW THIGH SYS  mmHg    RIGHT POPLITEAL SYS  mmHg    RIGHT DORSALIS PEDIS SYS  mmHg    RIGHT POST TIBIAL SYS  mmHg    RIGHT GREAT TOE SYS  mmHg    RIGHT FLAQUITA RATIO 1.3     RIGHT TBI RATIO 1.12     LEFT HIGH THIGH SYS  mmHg    LEFT LOW THIGH SYS  mmHg    LEFT POPLITEAL SYS  mmHg    LEFT DORSALIS PEDIS SYS  mmHg    LEFT POST TIBIAL SYS  mmHg    LEFT GREAT TOE SYS  mmHg    LEFT FLAQUITA RATIO 1.3     LEFT TBI RATIO 1.31     Upper arterial right arm brachial sys max 169 mmHg    Upper arterial left arm brachial sys max 167 mmHg       Radiology:  Imaging Results (last 24 hours)     Procedure Component Value Units Date/Time    US Renal Bilateral [[id number]] Collected:  02/05/18 2255     Updated:  02/07/18  0528    Narrative:       BILATERAL RENAL ULTRASOUND     CLINICAL HISTORY: lesa     FINDINGS: Longitudinal and transverse images of the kidneys were  obtained. The right kidney measures 11.5 cm in length. The left kidney  measures 13.0 cm in length. There is cortical thinning bilaterally  compatible with atrophy. There is no cystic or solid renal mass. There  is no hydronephrosis.     Bilateral pleural effusions are incidentally noted.                   Impression:       1.  Cortical atrophy, no mass or hydronephrosis.     This report was finalized on 2/7/2018 5:25 AM by Nitin Goodwin MD.                Medications have been reviewed:  Current Facility-Administered Medications   Medication Dose Route Frequency Provider Last Rate Last Dose   • acetaminophen (TYLENOL) tablet 650 mg  650 mg Oral Q6H PRN Hank Joaquin MD       • amLODIPine (NORVASC) tablet 10 mg  10 mg Oral Q24H Hank Joaquin MD   10 mg at 02/07/18 1042   • arformoterol (BROVANA) nebulizer solution 15 mcg  15 mcg Nebulization BID - RT Tony Mccauley MD   15 mcg at 02/07/18 1152   • aspirin chewable tablet 81 mg  81 mg Oral Daily Hank Joaquin MD   81 mg at 02/07/18 1043   • budesonide (PULMICORT) nebulizer solution 0.25 mg  0.25 mg Nebulization BID - RT Tony Mccauley MD   0.25 mg at 02/07/18 1153   • buPROPion SR (WELLBUTRIN SR) 12 hr tablet 200 mg  200 mg Oral Q12H Hank Joaquin MD   200 mg at 02/07/18 1043   • cholecalciferol (VITAMIN D3) tablet 1,000 Units  1,000 Units Oral Daily Hank Joaquin MD   1,000 Units at 02/07/18 1043   • citalopram (CeleXA) tablet 20 mg  20 mg Oral Daily Hank Joaquin MD   20 mg at 02/07/18 1044   • CloNIDine (CATAPRES) tablet 0.2 mg  0.2 mg Oral Q12H Hank Joaquin MD       • dextrose (D50W) solution 25 g  25 g Intravenous Q15 Min PRN Danilo Del Valle MD       • dextrose (GLUTOSE) oral gel 15 g  15 g Oral Q15 Min PRN Danilo Del Valle MD       • epoetin chloe  (EPOGEN,PROCRIT) injection 20,000 Units  20,000 Units Subcutaneous Weekly Hank Joaquin MD   20,000 Units at 02/06/18 1844   • gabapentin (NEURONTIN) capsule 300 mg  300 mg Oral Q12H Hank Joaquin MD   300 mg at 02/07/18 1044   • glucagon (human recombinant) (GLUCAGEN DIAGNOSTIC) injection 1 mg  1 mg Subcutaneous PRN Danilo Del Valle MD       • heparin (porcine) injection 3,000 Units  3,000 Units Intracatheter Once Ttuu Smith MD       • hydrALAZINE (APRESOLINE) tablet 50 mg  50 mg Oral Q8H Hank Joaquin MD   50 mg at 02/07/18 1410   • hydrocerin (EUCERIN) cream   Topical Daily He Strange MD       • HYDROcodone-acetaminophen (NORCO) 5-325 MG per tablet 1 tablet  1 tablet Oral Q4H PRN Hank Joaquin MD   1 tablet at 02/07/18 1205   • insulin aspart (novoLOG) injection 0-9 Units  0-9 Units Subcutaneous 4x Daily With Meals & Nightly Danilo Del Valle MD   2 Units at 02/06/18 1322   • insulin detemir (LEVEMIR) injection 15 Units  15 Units Subcutaneous Nightly He Strange MD       • ipratropium-albuterol (DUO-NEB) nebulizer solution 3 mL  3 mL Nebulization Q6H While Awake - RT He Strange MD   3 mL at 02/07/18 1419   • iron sucrose (VENOFER) 200 mg in sodium chloride 0.9 % 100 mL IVPB  200 mg Intravenous Q24H Hank Joaquin MD   200 mg at 02/06/18 1844   • metoprolol tartrate (LOPRESSOR) tablet 50 mg  50 mg Oral Q12H Hank Joaquin MD   50 mg at 02/07/18 1044   • pantoprazole (PROTONIX) EC tablet 40 mg  40 mg Oral BID AC Danilo Hensley MD       • risperiDONE (risperDAL) tablet 2 mg  2 mg Oral Nightly Hank Joaquin MD   2 mg at 02/05/18 2358   • sodium bicarbonate tablet 650 mg  650 mg Oral TID Hank Joaquin MD   650 mg at 02/07/18 1045   • tamsulosin (FLOMAX) 24 hr capsule 0.4 mg  0.4 mg Oral Nightly Hank Joaquin MD   0.4 mg at 02/05/18 4616   • traZODone (DESYREL) tablet 100 mg  100 mg Oral Nightly  Hank Joaquin MD   100 mg at 02/05/18 2870       Assessment/Plan     Active Problems:    Acute renal failure (ARF)      Assessment:  (CKD stage IV, worsening with progression to end-stage renal disease  Anemia of chronic disease with iron deficiency  Lower Extremity edema, better with dialysis/ultrafiltration  Diabetes mellitus  Multiple skin ulcers, infectious disease to see  Decubitus ulcers  Deconditioning  COPD  Hypertension, worse, poorly controlled  Tobacco abuse  Peripheral vascular disease         Plan:  Respiratory status and volume status much improved after dialysis  Appreciate assistance of  [facility]  We will plan additional dialysis/ultrafiltration tomorrow.  CPAP at night for sleep apnea per pulmonary service  Continue wound care lower extremities, no antibiotics at this time per Dr. Gutierrez  Transfuse as needed for hemoglobin less than 7, continue Epogen and IV iron  Blood pressure above goal, increase hydralazine and clonidine, should improve with ultrafiltration as well).             Continue to monitor renal function, electroytes and volume closely   Please call me with any questions or concerns      Hank Joaquin MD   [facility]   [phone]    02/07/18  3:38 PM      Dictation performed using Dragon dictation software

## 2018-02-07 NOTE — PROGRESS NOTES
"  Daily Progress Note.   [address]  2/7/2018    Patient:  Name:  Kevin Mckeon V  MRN:  [id number]  1950  67 y.o.  male         Interval History:  Much better today.  Awake alert and able to relate prior medical history.  Aware of this surroundings and medical care.  Denies chest pain denies shortness of breath.  No productive cough.  Dialyzed last night     Physical Exam:  /90 (BP Location: Right arm, Patient Position: Lying)  Pulse 70  Temp 98.3 °F (36.8 °C) (Oral)   Resp 18  Ht 182.9 cm (72.01\")  Wt 83.7 kg (184 lb 8.4 oz)  SpO2 95%  BMI 25.02 kg/m2  Body mass index is 25.02 kg/(m^2).    Intake/Output Summary (Last 24 hours) at 02/07/18 0915  Last data filed at 02/07/18 0408   Gross per 24 hour   Intake              300 ml   Output             5500 ml   Net            -5200 ml     GENERAL: well appearing alert  HEENT:  EOMI, nonicteric sclera, Unable to  JVD secondary to large unkept beard  PULMONARY:    Really no crackles at bases today no wheeze unlabored respiratory effort  CARDIAC:  RRR   ABD: SNTND BS+  EXT: BLE edema 1Pitting + to sacrum with erythematous changes over bilateral shins and a non bleeding abrasion on left heel, pulses symmetric 2+ bilaterally  NEURO:  Awake alert cranial nerves intact in all extremities speech intact  SKIN:+lesions as above  PSYCH: calm appropriate       Data Review:  Notable Labs:    Results from last 7 days  Lab Units 02/07/18  0558 02/06/18  2212 02/06/18  1402 02/06/18  0440   WBC 10*3/mm3  --   --   --  7.48   HEMOGLOBIN g/dL 8.2* 8.2* 6.8* 6.9*   PLATELETS 10*3/mm3  --   --   --  266     Results from last 7 days  Lab Units 02/07/18  0558 02/06/18  0440   SODIUM mmol/L 139 135*   POTASSIUM mmol/L 3.9 4.9   CHLORIDE mmol/L 102 100   CO2 mmol/L 24.3 22.6   BUN mg/dL 29* 63*   CREATININE mg/dL 2.40* 3.85*   GLUCOSE mg/dL 98 258*   CALCIUM mg/dL 8.0* 8.3*   MAGNESIUM mg/dL  --  2.1   PHOSPHORUS mg/dL 3.8 6.7*   Estimated " Creatinine Clearance: 36.3 mL/min (by C-G formula based on Cr of 2.4).    Imaging:  No new chest imaging    Scheduled meds:      amLODIPine 10 mg Oral Q24H   arformoterol 15 mcg Nebulization BID - RT   aspirin 81 mg Oral Daily   budesonide 0.25 mg Nebulization BID - RT   buPROPion  mg Oral Q12H   cholecalciferol 1,000 Units Oral Daily   citalopram 20 mg Oral Daily   CloNIDine 0.1 mg Oral Q12H   epoetin chloe 20,000 Units Subcutaneous Weekly   gabapentin 300 mg Oral Q12H   heparin (porcine) 3,000 Units Intracatheter Once   hydrALAZINE 25 mg Oral Q8H   hydrocerin  Topical Daily   insulin aspart 0-9 Units Subcutaneous 4x Daily With Meals & Nightly   insulin detemir 15 Units Subcutaneous Nightly   ipratropium-albuterol 3 mL Nebulization Q6H While Awake - RT   iron sucrose 200 mg Intravenous Q24H   metoprolol tartrate 50 mg Oral Q12H   pantoprazole 40 mg Oral BID AC   risperiDONE 2 mg Oral Nightly   sodium bicarbonate 650 mg Oral TID   tamsulosin 0.4 mg Oral Nightly   traZODone 100 mg Oral Nightly       ASSESSMENT  /  PLAN:  Acute bilateral pulmonary edema  Acute hypoxic resp failure  Bilateral pleural effusions  Anemia  Hyperglycemia  ESRD  DMII   JAMES On CPAP therapy  H/o ulcerative esophagitis, duodenal erosions     Sig improvement of pulm edema and effusions  autobipap at night for james unless family can bring in his home cpap for james  HD seems to have remarkable improvement - continue per Dr Joaquin, remarkable clinical turn around  No wheeze on exam to suggest ae of copd and steroids will sig worsen our glycemic control so will hold off.  Bronchodilators continue  Will continue to follow, suspect that with continued fluid removal we will be able to wean off oxygen.    D/w Dr Joaquin     Thanks for allowing us to participate in the care of this patient.  LPC is always available to discuss any concerns, questions or to help coordinate the patient's care.       Tony Mccauley MD  [facility]  02/07/18

## 2018-02-07 NOTE — OP NOTE
Operative Note  Date of Admission:  2/5/2018  OR Date: 2/6/2018    Pre-op Diagnosis:   Renal failure acute    Post-Op Diagnosis Codes:  Same    Procedure:   1) duplex directed right common femoral vein Shiley non-tunneled dialysis catheter placement    Surgeon: Vitaly Coronel MD    Assistant: None    Anesthesia: Local        Estimated Blood Loss: Minimal      Complications: None    Findings: See dictation    Indications:    The patient is an 67 y.o. male seen for evaluation with acute renal failure.  He is on BiPAP and is unable to 1 Trendelenburg the BiPAP mask is in the way of a jugular Shiley.  S will need to shave Jenkins before getting a line in that area.  Femoral access is our only real option and he understands and agrees with plan.       Procedure:    Patient was prepped and draped sterilely.  Accessing the common femoral vein on the right under duplex direction we passed a wire centrally.  Tract was incised dilated catheter was placed as 24 cm were flushed in flow well.  Each port was flushed with heparinized saline at completion duplex confirmed position.          Active Hospital Problems (** Indicates Principal Problem)    Diagnosis Date Noted   • Acute renal failure (ARF) [N17.9] 02/06/2018      Resolved Hospital Problems    Diagnosis Date Noted Date Resolved   No resolved problems to display.      Nitin Coronel MD     Date: 2/6/2018  Time: 10:10 PM

## 2018-02-07 NOTE — PROGRESS NOTES
"  Infectious Diseases Progress Note    Bettie Gutierrez MD     [facility]  [place]: 2 days  Patient Identification:  Name: Kevin Mckeon V  Age: 67 y.o.  Sex: male  :  1950  MRN: [id number]         Primary Care Physician: Alicia Harley MD            Subjective: Doing better denies any specific complaints denies any pain and discomfort in the right leg.  Interval History: See consultation note.    Objective:    Scheduled Meds:  amLODIPine 10 mg Oral Q24H   arformoterol 15 mcg Nebulization BID - RT   aspirin 81 mg Oral Daily   budesonide 0.25 mg Nebulization BID - RT   buPROPion  mg Oral Q12H   cholecalciferol 1,000 Units Oral Daily   citalopram 20 mg Oral Daily   CloNIDine 0.1 mg Oral Q12H   epoetin chloe 20,000 Units Subcutaneous Weekly   gabapentin 300 mg Oral Q12H   heparin (porcine) 3,000 Units Intracatheter Once   hydrALAZINE 25 mg Oral Q8H   hydrocerin  Topical Daily   insulin aspart 0-9 Units Subcutaneous 4x Daily With Meals & Nightly   insulin detemir 15 Units Subcutaneous Nightly   ipratropium-albuterol 3 mL Nebulization Q6H While Awake - RT   iron sucrose 200 mg Intravenous Q24H   metoprolol tartrate 50 mg Oral Q12H   pantoprazole 40 mg Oral BID AC   risperiDONE 2 mg Oral Nightly   sodium bicarbonate 650 mg Oral TID   tamsulosin 0.4 mg Oral Nightly   traZODone 100 mg Oral Nightly     Continuous Infusions:     Vital signs in last 24 hours:  Temp:  [97.4 °F (36.3 °C)-98.8 °F (37.1 °C)] 98.3 °F (36.8 °C)  Heart Rate:  [61-76] 70  Resp:  [16-25] 18  BP: (122-180)/() 157/90    Intake/Output:    Intake/Output Summary (Last 24 hours) at 18 0925  Last data filed at 18 0408   Gross per 24 hour   Intake              300 ml   Output             5500 ml   Net            -5200 ml       Exam:  /90 (BP Location: Right arm, Patient Position: Lying)  Pulse 70  Temp 98.3 °F (36.8 °C) (Oral)   Resp 18  Ht 182.9 cm (72.01\")  Wt 83.7 kg (184 lb 8.4 oz)  SpO2 95%  BMI " 25.02 kg/m2    General Appearance:    Alert, cooperative, no distress, AAOx3                          Head:    Normocephalic, without obvious abnormality, atraumatic                           Eyes:    PERRL, conjunctiva/corneas clear, EOM's intact, both eyes                         Throat:   Lips, tongue, gums normal; oral mucosa pink and moist                           Neck:   Supple, symmetrical, trachea midline, no JVD                         Lungs:    Clear to auscultation bilaterally, respirations unlabored                 Chest Wall:    No tenderness or deformity                          Heart:    Regular rate and rhythm, S1 and S2 normal, no murmur,no  Rub                                      or gallop                  Abdomen:     Soft, non-tender, bowel sounds active, no masses, no                                                        organomegaly                  Extremities:   Edema of the lower extremities with blister ulcers on the leg right heel is pressure wound with no surrounding cellulitis overall legs have bilateral mild erythema with no warmth or tenderness                          Data Review:    I reviewed the patient's new clinical results.    Results from last 7 days  Lab Units 02/07/18  0558 02/06/18  2212 02/06/18  1402 02/06/18  0440   WBC 10*3/mm3  --   --   --  7.48   HEMOGLOBIN g/dL 8.2* 8.2* 6.8* 6.9*   PLATELETS 10*3/mm3  --   --   --  266       Results from last 7 days  Lab Units 02/07/18  0558 02/06/18  0440   SODIUM mmol/L 139 135*   POTASSIUM mmol/L 3.9 4.9   CHLORIDE mmol/L 102 100   CO2 mmol/L 24.3 22.6   BUN mg/dL 29* 63*   CREATININE mg/dL 2.40* 3.85*   CALCIUM mg/dL 8.0* 8.3*   GLUCOSE mg/dL 98 258*     Culture data reviewed    Assessment:  1-bilateral lower extremity chronic edema with stasis changes and immobility related wound with no obvious active infectionAt this time but low threshold to recommend systemic antibiotic therapy if he shows leukocytosis fever or change in  mental status.  2-right heel decubitus changes with wound with no surrounding cellulitis this time but did risk of infection going forward for which she needs to be monitored.  3-anemia with progressive drop in hemoglobin likely due to GI loss.  4-hypercapnic hypoxemic respiratory failure on BiPAP  5-acute on chronic renal failure  6-underlying diabetes, hypertension, neuropathy  7-history of MRSA colonization.  Plan:  Continue to observe off of antibiotic therapy and low threshold to start antibiotic treatment consisting of vancomycin if his condition deteriorates or shows evolving septic parameters.    Bettie Gutierrez MD  2/7/2018  9:25 AM    Much of this encounter note is an electronic transcription/translation of spoken language to printed text. The electronic translation of spoken language may permit erroneous, or at times, nonsensical words or phrases to be inadvertently transcribed; Although I have reviewed the note for such errors, some may still exist

## 2018-02-07 NOTE — PLAN OF CARE
Problem: Patient Care Overview (Adult)  Goal: Plan of Care Review  Outcome: Ongoing (interventions implemented as appropriate)   02/06/18 2101   Coping/Psychosocial Response Interventions   Plan Of Care Reviewed With patient   Patient Care Overview   Progress declining   Outcome Evaluation   Outcome Summary/Follow up Plan pt alert and oriented in am. pt hard to arouse once pt got on bipap. pt npo due to bipap. 1 unit of rbc given. continous protonix drip. iron infusion. doppler of lower extremeties is negative for bilateral dvt. shiiley consent signed. pt woke up at end of shift more alert. will continue to monitor closely.      Goal: Adult Individualization and Mutuality  Outcome: Ongoing (interventions implemented as appropriate)    Goal: Discharge Needs Assessment  Outcome: Ongoing (interventions implemented as appropriate)      Problem: Fall Risk (Adult)  Goal: Identify Related Risk Factors and Signs and Symptoms  Outcome: Outcome(s) achieved Date Met: 02/06/18    Goal: Absence of Falls  Outcome: Ongoing (interventions implemented as appropriate)      Problem: Renal Failure/Kidney Injury, Acute (Adult)  Goal: Signs and Symptoms of Listed Potential Problems Will be Absent or Manageable (Renal Failure/Kidney Injury, Acute)  Outcome: Ongoing (interventions implemented as appropriate)      Problem: Diabetes, Type 2 (Adult)  Goal: Signs and Symptoms of Listed Potential Problems Will be Absent or Manageable (Diabetes, Type 2)  Outcome: Ongoing (interventions implemented as appropriate)      Problem: Pressure Ulcer (Adult)  Goal: Signs and Symptoms of Listed Potential Problems Will be Absent or Manageable (Pressure Ulcer)  Outcome: Ongoing (interventions implemented as appropriate)

## 2018-02-07 NOTE — DISCHARGE PLACEMENT REQUEST
"Kevin Mckeon (67 y.o. Male)     Date of Birth Social Security Number Address Home Phone MRN    1950  [address] [place] [address] [phone] [id number]    Rastafarian Marital Status          None Single       Admission Date Admission Type Admitting Provider Attending Provider Department, Room/Bed    2/5/18 Urgent Hank Joaquin MD Dailey, Andrew James, MD [address], 636/1    Discharge Date Discharge Disposition Discharge Destination                      Attending Provider: Hank Joaquin MD     Allergies:  No Known Allergies    Isolation:  Contact   Infection:  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)   Code Status:  Prior    Ht:  182.9 cm (72.01\")   Wt:  83.7 kg (184 lb 8.4 oz)    Admission Cmt:  None   Principal Problem:  None                Active Insurance as of 2/5/2018     Primary Coverage     Payor Plan Insurance Group Employer/Plan Group    [employer] [place] MEDICARE REPLACEMENT [facility] REPL      Payor Plan Address Payor Plan Phone Number Effective From Effective To    [address] [address] [phone] 1/17/2018     [place] [address]       Subscriber Name Subscriber Birth Date Member ID       KEVIN MCKEON 1950 [id number]                 Emergency Contacts      (Rel.) Home Phone Work Phone Mobile Phone    Dick Mckeon (Son) [phone] -- --    LindaDeisy (Sister) [phone] -- --              "

## 2018-02-07 NOTE — PLAN OF CARE
Problem: Patient Care Overview (Adult)  Goal: Plan of Care Review  Outcome: Ongoing (interventions implemented as appropriate)   02/07/18 0521   Coping/Psychosocial Response Interventions   Plan Of Care Reviewed With patient   Patient Care Overview   Progress improving   Outcome Evaluation   Outcome Summary/Follow up Plan pt A&Ox4, Had Shiley placed in R femoral artery at bedside. Stat dialysis done overnight 3 Kilos removed. Patient tolerated well. Protonix drop continued. Bi-pap D/c'd per respiratory. Pt maintainig O2 sats mid 90's. will continue to monitor patient closely       Problem: Fall Risk (Adult)  Goal: Absence of Falls  Outcome: Ongoing (interventions implemented as appropriate)      Problem: Renal Failure/Kidney Injury, Acute (Adult)  Goal: Signs and Symptoms of Listed Potential Problems Will be Absent or Manageable (Renal Failure/Kidney Injury, Acute)  Outcome: Ongoing (interventions implemented as appropriate)      Problem: Diabetes, Type 2 (Adult)  Goal: Signs and Symptoms of Listed Potential Problems Will be Absent or Manageable (Diabetes, Type 2)  Outcome: Ongoing (interventions implemented as appropriate)      Problem: Pressure Ulcer (Adult)  Goal: Signs and Symptoms of Listed Potential Problems Will be Absent or Manageable (Pressure Ulcer)  Outcome: Ongoing (interventions implemented as appropriate)

## 2018-02-07 NOTE — THERAPY EVALUATION
Acute Care - Physical Therapy Initial Evaluation  [facility]     Patient Name: Kevin Mckeon V  : 1950  MRN: [id number]  Today's Date: 2018   Onset of Illness/Injury or Date of Surgery Date: 18  Date of Referral to PT: 18  Referring Physician: Mikey      Admit Date: 2018     Visit Dx:  No diagnosis found.  Patient Active Problem List   Diagnosis   • DKA (diabetic ketoacidoses)   • Early satiety   • ARF (acute renal failure)   • CKD (chronic kidney disease) stage 4, GFR 15-29 ml/min   • Nephrotic range proteinuria   • DM (diabetes mellitus) type II controlled with renal manifestation   • Anemia of chronic renal failure   • Ulcer of right knee   • Acute renal failure (ARF)     Past Medical History:   Diagnosis Date   • COPD (chronic obstructive pulmonary disease)    • Diabetes mellitus    • Hypertension      Past Surgical History:   Procedure Laterality Date   • AMPUTATION FOOT / TOE  two years ago    5th toe on right foot   • ENDOSCOPY N/A 2018    Procedure: ESOPHAGOGASTRODUODENOSCOPY;  Surgeon: Danilo Hensley MD;  Location: [facility];  Service:    • HERNIA REPAIR     • LEG DEBRIDEMENT Bilateral 2018    Procedure: LOWER EXTREMITY DEBRIDEMENT 8:00;  Surgeon: Kaylyn Tillman DO;  Location: [facility];  Service:           PT ASSESSMENT (last 72 hours)      PT Evaluation       18 0800 18 1545    Rehab Evaluation    Document Type evaluation  -     Subjective Information agree to therapy;complains of;weakness;fatigue  -     Evaluation Not Performed  other (see comments)   pt's hgb at 6.8 this pm, also off floor to vascular; will follow up tomorrow.  -EJ    Patient Effort, Rehab Treatment good  -LC     Symptoms Noted Comment R heel wound began weeping durng ambulation  -LC     General Information    Patient Profile Review yes  -LC     Onset of Illness/Injury or Date of Surgery Date 18  -     Referring Physician Mikey  -     General  Observations awake, alert, [place], Elise, IV, 3L O2/NC  -LC     Pertinent History Of Current Problem acute renal failure  -     Precautions/Limitations fall precautions;oxygen therapy device and L/min   wound on L heel, contact precautions  -     Prior Level of Function min assist:;transfer;bed mobility;ADL's;independent:;gait  -     Equipment Currently Used at Home bath bench;walker, standard  -     Plans/Goals Discussed With patient;agreed upon  -     Risks Reviewed patient:;LOB;nausea/vomiting;dizziness;increased discomfort;change in vital signs;increased drainage;lines disloged  -     Benefits Reviewed patient:;improve function;increase independence;increase strength;increase balance;decrease pain;decrease risk of DVT;improve skin integrity;increase knowledge  -     Barriers to Rehab none identified  -     Living Environment    Lives With facility resident  -     Living Arrangements extended care facility  -     Home Accessibility no concerns  -     Stair Railings at Home none  -     Type of Financial/Environmental Concern none  -     Transportation Available ambulance  -     Clinical Impression    Date of Referral to PT 02/05/18  -     PT Diagnosis impaired gait and mobility  -     Patient/Family Goals Statement return to SNF for rehab  -     Criteria for Skilled Therapeutic Interventions Met yes;treatment indicated  -     Predicted Duration of Therapy Intervention (days/wks) 1 week  -     Vital Signs    Pre SpO2 (%) 95  -LC     O2 Delivery Pre Treatment supplemental O2   3L  -LC     Intra SpO2 (%) 90  -LC     O2 Delivery Intra Treatment supplemental O2   3L  -LC     Post SpO2 (%) 93  -LC     O2 Delivery Post Treatment supplemental O2   3L  -LC     Pain Assessment    Pain Assessment No/denies pain  -     Vision Assessment/Intervention    Visual Impairment WFL with corrective lenses  -     Cognitive Assessment/Intervention    Current Cognitive/Communication Assessment  functional  -     Orientation Status oriented x 4  -     Follows Commands/Answers Questions 100% of the time;able to follow multi-step instructions  -     Personal Safety WNL/WFL  -     Personal Safety Interventions fall prevention program maintained;gait belt;muscle strengthening facilitated;nonskid shoes/slippers when out of bed  -     ROM (Range of Motion)    General ROM Detail BLE AROM WNL  -LC     MMT (Manual Muscle Testing)    General MMT Assessment Detail BLE MMT grossly 3+/5  -LC     Bed Mobility, Assessment/Treatment    Bed Mobility, Assistive Device bed rails;head of bed elevated  -     Bed Mobility, Roll Right, [place] contact guard assist  -     Bed Mobility, Scoot/Bridge, [place] contact guard assist  -     Bed Mob, Supine to Sit, [place] minimum assist (75% patient effort)  -     Bed Mob, Sit to Supine, [place] contact guard assist  -     Bed Mobility, Safety Issues decreased use of legs for bridging/pushing;impaired trunk control for bed mobility  -     Bed Mobility, Impairments strength decreased;impaired balance  -     Transfer Assessment/Treatment    Transfers, Sit-Stand [place] contact guard assist  -     Transfers, Stand-Sit [place] contact guard assist  -     Transfers, Sit-Stand-Sit, Assist Device rolling walker  -     Transfer, Safety Issues step length decreased;weight-shifting ability decreased  -     Transfer, Impairments strength decreased;impaired balance  -     Gait Assessment/Treatment    Gait, [place] Level contact guard assist  -     Gait, Assistive Device rolling walker  -     Gait, Distance (Feet) 30  -     Gait, Gait Deviations antalgic;decreased heel strike;narrow base;step length decreased;toe-to-floor clearance decreased  -     Gait, Safety Issues step length decreased;supplemental O2  -     Gait, Impairments strength decreased;impaired balance  -     Motor Skills/Interventions    Additional  Documentation Balance Skills Training (Group)  -     Balance Skills Training    Sitting-Level of Assistance Distant supervision  -     Sitting-Balance Support Feet supported  -     Standing-Level of Assistance Contact guard  -     Static Standing Balance Support assistive device  -     Gait Balance-Level of Assistance Contact guard;Minimum assistance  -     Gait Balance Support assistive device  -     Positioning and Restraints    Pre-Treatment Position in bed  -     Post Treatment Position bed  -     In Bed notified nsg;fowlers;call light within reach;encouraged to call for assist;exit alarm on;side rails up x2  -       02/06/18 0816 02/05/18 1900    Rehab Evaluation    Evaluation Not Performed patient unavailable for evaluation   Hgb 6.9 w/ plan to receive blood today. Dicussed attempting PT this afternoon w/ KATARINA Lynn.    -AR     General Information    Equipment Currently Used at Home  bath bench;walker, standard  -EG    Living Environment    Lives With  facility resident  -EG    Living Arrangements  extended care facility  -EG    Home Accessibility  no concerns  -EG    Stair Railings at Home  none  -EG    Type of Financial/Environmental Concern  none  -EG    Transportation Available  ambulance  -EG      User Key  (r) = Recorded By, (t) = Taken By, (c) = Cosigned By    Initials Name Provider Type    EG Estefany Castro, RN Registered Nurse    JOSÉ Rai, PT Physical Therapist    AR Vivien Driscoll, PT Physical Therapist    BRENDAN Odom, PT DPT Physical Therapist          Physical Therapy Education     Title: PT OT SLP Therapies (Active)     Topic: Physical Therapy (Active)     Point: Mobility training (Done)    Learning Progress Summary    Learner Readiness Method Response Comment Documented by Status   Patient Acceptance E,D ALEX,NUVIA safety during transfers and gait, benefits of activity  02/07/18 0834 Done                      User Key     Initials Effective Dates Name Provider  Type Discipline     08/02/16 -  EDUADRO TomlinsonT Physical Therapist PT                PT Recommendation and Plan  Anticipated Discharge Disposition: skilled nursing facility  PT Frequency: daily  Plan of Care Review  Plan Of Care Reviewed With: patient  Outcome Summary/Follow up Plan: PT EVAL completed. Pt transferred supine to sit with min x 1 and use of bed rail. Pt performed sit to stand with CGA x 1 and RW. Pt ambulated 30 ft with CGA x 1 and RW. Pt had heel wound that was painful while standing and ambulating that limited his step length. Pt has some balance deficitis with turning durnig ambulation. Pt requires skilled therapy due to balance deficits and decreased ambulation and strength. Recommend D/C to SNF          IP PT Goals       02/07/18 0834          Bed Mobility PT LTG    Bed Mobility PT LTG, Date Established 02/07/18  -      Bed Mobility PT LTG, Time to Achieve 1 wk  -LC      Bed Mobility PT LTG, Activity Type all bed mobility  -LC      Bed Mobility PT LTG, [place] Level supervision required  -LC      Bed Mobility PT Goal  LTG, Assist Device bed rails  -LC      Transfer Training PT LTG    Transfer Training PT LTG, Date Established 02/07/18  -LC      Transfer Training PT LTG, Time to Achieve 1 wk  -LC      Transfer Training PT LTG, Activity Type all transfers  -LC      Transfer Training PT LTG, [place] Level supervision required  -LC      Transfer Training PT LTG, Assist Device walker, rolling  -LC      Gait Training PT LTG    Gait Training Goal PT LTG, Date Established 02/07/18  -LC      Gait Training Goal PT LTG, Time to Achieve 1 wk  -LC      Gait Training Goal PT LTG, [place] Level supervision required  -LC      Gait Training Goal PT LTG, Assist Device walker, rolling  -LC      Gait Training Goal PT LTG, Distance to Achieve 200  -LC        User Key  (r) = Recorded By, (t) = Taken By, (c) = Cosigned By    Initials Name Provider Type     Abhay Odom PT DPT Physical  Therapist                Outcome Measures       02/07/18 0800          How much help from another person do you currently need...    Turning from your back to your side while in flat bed without using bedrails? 3  -LC      Moving from lying on back to sitting on the side of a flat bed without bedrails? 3  -LC      Moving to and from a bed to a chair (including a wheelchair)? 3  -LC      Standing up from a chair using your arms (e.g., wheelchair, bedside chair)? 3  -LC      Climbing 3-5 steps with a railing? 2  -LC      To walk in hospital room? 3  -LC      AM-PAC 6 Clicks Score 17  -      Functional Assessment    Outcome Measure Options AM-PAC 6 Clicks Basic Mobility (PT)  -        User Key  (r) = Recorded By, (t) = Taken By, (c) = Cosigned By    Initials Name Provider Type    BRENDAN Odom, PT DPT Physical Therapist           Time Calculation:         PT Charges       02/07/18 0837          Time Calculation    Start Time 0750  -      Stop Time 0835  -      Time Calculation (min) 45 min  -      PT Received On 02/07/18  -      PT - Next Appointment 02/08/18  -      PT Goal Re-Cert Due Date 02/14/18  -      Time Calculation- PT    Total Timed Code Minutes- PT 45 minute(s)  -        User Key  (r) = Recorded By, (t) = Taken By, (c) = Cosigned By    Initials Name Provider Type    BRENDAN Odom, PT DPT Physical Therapist          Therapy Charges for Today     Code Description Service Date Service Provider Modifiers Qty    34037897936 HC PT EVAL LOW COMPLEXITY 1 2/7/2018 Abhay Odom PT DPT GP 1    49190292203 HC GAIT TRAINING EA 15 MIN 2/7/2018 Abhay Odom, PT DPT GP 1    78557524104 HC PT THERAPEUTIC ACT EA 15 MIN 2/7/2018 Abhay Odom, PT DPT GP 1          PT G-Codes  Outcome Measure Options: AM-PAC 6 Clicks Basic Mobility (PT)      Abhay Odom PT DPT  2/7/2018

## 2018-02-07 NOTE — PROGRESS NOTES
"  Name: Kevin Mckeon V  ADMIT: 2018   Age/Sex: 67 y.o.male LOS:  LOS: 2 days    :    1950     ROOM: [facility]   MRN:    [id number]    PCP:    Alicia Harley MD     Subjective   On BiPAP all day yesterday and all night. HD initiated last night with 3kg removed. Looks much better this am. No sob. No melena    Objective   Vital Signs  Temp:  [97.4 °F (36.3 °C)-98.8 °F (37.1 °C)] 98.3 °F (36.8 °C)  Heart Rate:  [61-76] 70  Resp:  [16-25] 18  BP: (122-180)/() 157/90  Body mass index is 25.02 kg/(m^2).    Objective:  General Appearance:  Comfortable and well-appearing.    Vital signs: (most recent): Blood pressure 157/90, pulse 70, temperature 98.3 °F (36.8 °C), temperature source Oral, resp. rate 18, height 182.9 cm (72.01\"), weight 83.7 kg (184 lb 8.4 oz), SpO2 95 %.  Vital signs are normal.    HEENT: Normal HEENT exam.    Lungs:  Normal effort.  There are decreased breath sounds.    Heart: Normal rate.  Regular rhythm.    Abdomen: Abdomen is soft and non-distended.  Bowel sounds are normal.   There is no abdominal tenderness.     Extremities: There is dependent edema.  (Multiple LE wounds b/l)  Neurological: Patient is alert and oriented to person, place and time.    Skin:  Warm and dry.  No rash.             Results Review:       I reviewed the patient's new clinical results.    Results from last 7 days  Lab Units 18  0558 18  2212 18  1402 18  0440   WBC 10*3/mm3  --   --   --  7.48   HEMOGLOBIN g/dL 8.2* 8.2* 6.8* 6.9*   PLATELETS 10*3/mm3  --   --   --  266     Results from last 7 days  Lab Units 18  0558 18  0440   SODIUM mmol/L 139 135*   POTASSIUM mmol/L 3.9 4.9   CHLORIDE mmol/L 102 100   CO2 mmol/L 24.3 22.6   BUN mg/dL 29* 63*   CREATININE mg/dL 2.40* 3.85*   GLUCOSE mg/dL 98 258*   Estimated Creatinine Clearance: 36.3 mL/min (by C-G formula based on Cr of 2.4).  Results from last 7 days  Lab Units 18  0558 18  0440   CALCIUM mg/dL 8.0* " 8.3*   ALBUMIN g/dL 2.20*  --    MAGNESIUM mg/dL  --  2.1   PHOSPHORUS mg/dL 3.8 6.7*       RADIOLOGY  2/7/2018  Pending      amLODIPine 10 mg Oral Q24H   arformoterol 15 mcg Nebulization BID - RT   aspirin 81 mg Oral Daily   budesonide 0.25 mg Nebulization BID - RT   buPROPion  mg Oral Q12H   cholecalciferol 1,000 Units Oral Daily   citalopram 20 mg Oral Daily   CloNIDine 0.1 mg Oral Q12H   epoetin chloe 20,000 Units Subcutaneous Weekly   gabapentin 300 mg Oral Q12H   heparin (porcine) 3,000 Units Intracatheter Once   hydrALAZINE 25 mg Oral Q8H   hydrocerin  Topical Daily   insulin aspart 0-9 Units Subcutaneous 4x Daily With Meals & Nightly   insulin detemir 25 Units Subcutaneous Nightly   ipratropium-albuterol 3 mL Nebulization Q6H While Awake - RT   iron sucrose 200 mg Intravenous Q24H   metoprolol tartrate 50 mg Oral Q12H   risperiDONE 2 mg Oral Nightly   sodium bicarbonate 650 mg Oral TID   tamsulosin 0.4 mg Oral Nightly   traZODone 100 mg Oral Nightly       pantoprazole 8 mg/hr Last Rate: 8 mg/hr (02/07/18 0408)   NPO Diet  NPO Diet NPO Except: Sips With Meds      Assessment/Plan   Assessment:   Active Problems:    Acute renal failure (ARF)        Plan:   1. Iron deficiency anemia  - appears he had Upper endoscopy during admission to [facility] in 01/2018 but I cannot pull up report  - Hb better after 1u pRBC  - I think this was more related to his CKD then GI bleed. He is currently on protonix drip. Will cont this until GI sees him  - IV iron per renal     2. CKD4 with progression to ESRD  - on HD now and much improved  - renal managing     3. DM2  - on levemir and SSI. Reduce levemir to 15u nightly  - CC diet     4. Acute hypercapneic and hypoxic resp failure  - due to underlying MING/OHV and fluid overload  - better after BiPAP and HD  - Pulm following     5. HTN  - home meds re-ordered, defer to renal for any changes    6. LE wounds  - needs Vascular workup  - ID has seen and no signs of active infection  currently. Cont local wound care         Disposition  TBD.      He Strange MD  [facility]  02/07/18  7:57 AM

## 2018-02-07 NOTE — CONSULTS
"GI Daily Progress Note    Assessment/Plan:    Active Problems:    Acute renal failure (ARF)       LOS: 2 days     Kevin Mckeon V is a 67 y.o. male who was admitted with Respiratory failure. He reports his symptoms are improving with treatment. Off BIPAP and conversant. Is headed for US/Doppler He is hungry and no GI complaints    Subjective:    Patient expresses No GI complaints  Patient denies abdominal pain, vomiting, diarrhea and bloody stools    Objective:    Vital signs in last 24 hours:  Temp:  [97.4 °F (36.3 °C)-98.8 °F (37.1 °C)] 98.3 °F (36.8 °C)  Heart Rate:  [61-76] 70  Resp:  [16-25] 18  BP: (122-180)/() 157/90    Intake/Output last 3 shifts:  I/O last 3 completed shifts:  In: 420 [P.O.:120; Blood:300]  Out: 6350 [Urine:3350; Other:3000]  Intake/Output this shift:        Results from last 7 days  Lab Units 02/07/18  0558 02/06/18  2212 02/06/18  1402 02/06/18  0440   WBC 10*3/mm3  --   --   --  7.48   HEMOGLOBIN g/dL 8.2* 8.2* 6.8* 6.9*   HEMATOCRIT % 26.9* 26.5* 22.9* 23.1*   PLATELETS 10*3/mm3  --   --   --  266       Results from last 7 days  Lab Units 02/07/18  0558 02/06/18  0440   SODIUM mmol/L 139 135*   POTASSIUM mmol/L 3.9 4.9   CHLORIDE mmol/L 102 100   CO2 mmol/L 24.3 22.6   BUN mg/dL 29* 63*   CREATININE mg/dL 2.40* 3.85*   GLUCOSE mg/dL 98 258*   CALCIUM mg/dL 8.0* 8.3*       Physical Exam:Abdomen  Sounds Normal Active Bowel Sounds   Distension Soft and Edematous   Tenderness Nontender     Respiratory Failure / Volume Overload  Celulitis/Diabetic \"foot\"   Schizophrenia / Poor historian  DM  CKD  Anemia  Leukocytosis    Will resume his Diet and follow, PPI may be PO.  "

## 2018-02-07 NOTE — PROGRESS NOTES
LOS: 2 days   Patient Care Team:  Alicia Harley MD as PCP - General (Family Medicine)    Chief Complaint: Acute renal failure    Subjective     67 y.o. male with acute renal failure and lower extremity ulcers and cellulitis.  We're awaiting vein mapping for his hemodialysis access and also awaiting lower extremity arterial testing.  He will need palindrome catheter placement regardless of whether we think this is going to be a very short or long-term placement due to the femoral site that we used being unsustainable.  He'll need to be off BiPAP and having his Beard shaved back.    Review of Systems  Review of Systems - Negative except mild shortness of air      Objective     Vital Signs  Temp:  [97.4 °F (36.3 °C)-98.8 °F (37.1 °C)] 98 °F (36.7 °C)  Heart Rate:  [61-76] 76  Resp:  [16-25] 18  BP: (122-180)/() 180/106    Physical Exam  General: No acute distress. Alert and oriented x 4  HEENT: No jugular venous distension, trachea is midline  CV: RRR, S1S2  Resp: Course mildly labored breathing on both sides  Abd: Abdomen is soft, nontender, nondistended  Extremities: Viable with multiple ulcers cellulitis and thin skin on both lower extremities.  Prior amputation sites of the toes noted.  Nonpalpable pulses.    Results Review:       Recent Results (from the past 24 hour(s))   Blood Gas, Arterial    Collection Time: 02/06/18  8:30 AM   Result Value Ref Range    Site Arterial: right radial     Loi's Test Positive     pH, Arterial 7.299 (C) 7.350 - 7.450 pH units    pCO2, Arterial 52.7 (H) 35.0 - 45.0 mm Hg    pO2, Arterial 67.4 (L) 80.0 - 100.0 mm Hg    HCO3, Arterial 25.9 22.0 - 28.0 mmol/L    Base Excess, Arterial -0.6 (L) 0.0 - 2.0 mmol/L    O2 Saturation Calculated 90.6 (L) 92.0 - 99.0 %    Barometric Pressure for Blood Gas 755.3 mmHg    Modality Cannula     Flow Rate 3 lpm    Rate 16 Breaths/minute   Type & Screen    Collection Time: 02/06/18 10:07 AM   Result Value Ref Range    ABO Type A      RH type Positive     Antibody Screen Negative    POC Glucose Once    Collection Time: 02/06/18 11:32 AM   Result Value Ref Range    Glucose 155 (H) 70 - 130 mg/dL   Blood Gas, Arterial    Collection Time: 02/06/18 12:25 PM   Result Value Ref Range    Site Arterial: right radial     Loi's Test Positive     pH, Arterial 7.314 (L) 7.350 - 7.450 pH units    pCO2, Arterial 52.2 (H) 35.0 - 45.0 mm Hg    pO2, Arterial 61.5 (L) 80.0 - 100.0 mm Hg    HCO3, Arterial 26.5 22.0 - 28.0 mmol/L    Base Excess, Arterial 0.3 0.0 - 2.0 mmol/L    O2 Saturation Calculated 88.6 (L) 92.0 - 99.0 %    A-a Gradiant 0.3 mmHg    Barometric Pressure for Blood Gas 756.3 mmHg    Modality BiPap     FIO2 35 %    Set [facility] Resp Rate 20     Rate 23 Breaths/minute   Respiratory Panel, PCR - Swab, Nasopharynx    Collection Time: 02/06/18  1:37 PM   Result Value Ref Range    ADENOVIRUS, PCR Not Detected Not Detected    Coronavirus 229E Not Detected Not Detected    Coronavirus HKU1 Not Detected Not Detected    Coronavirus NL63 Not Detected Not Detected    Coronavirus OC43 Not Detected Not Detected    Human Metapneumovirus Not Detected Not Detected    Human Rhinovirus/Enterovirus Not Detected Not Detected    Influenza B PCR Not Detected Not Detected    Parainfluenza Virus 1 Not Detected Not Detected    Parainfluenza Virus 2 Not Detected Not Detected    Parainfluenza Virus 3 Not Detected Not Detected    Parainfluenza Virus 4 Not Detected Not Detected    Bordetella pertussis pcr Not Detected Not Detected    Influenza A H1N1 2009 PCR Not Detected Not Detected    Chlamydophila pneumoniae PCR Not Detected Not Detected    Mycoplasma pneumo by PCR Not Detected Not Detected    Influenza A PCR Not Detected Not Detected    Influenza A H3 Not Detected Not Detected    Influenza A H1 Not Detected Not Detected    RSV, PCR Not Detected Not Detected   Hemoglobin & Hematocrit, Blood    Collection Time: 02/06/18  2:02 PM   Result Value Ref Range    Hemoglobin 6.8 (C)  13.7 - 17.6 g/dL    Hematocrit 22.9 (L) 40.4 - 52.2 %   Duplex Venous Lower Extremity - Bilateral CAR    Collection Time: 02/06/18  4:17 PM   Result Value Ref Range    Right Common Femoral Spont Y     Right Common Femoral Phasic Y     Right Common Femoral Augment Y     Right Common Femoral Competent Y     Right Common Femoral Compress C     Right Saphenofemoral Junction Spont Y     Right Saphenofemoral Junction Phasic Y     Right Saphenofemoral Junction Augment Y     Right Saphenofemoral Junction Compress C     Right Proximal Femoral Compress C     Right Mid Femoral Spont Y     Right Mid Femoral Phasic Y     Right Mid Femoral Augment Y     Right Mid Femoral Competent Y     Right Mid Femoral Compress C     Right Distal Femoral Compress C     Right Popliteal Spont Y     Right Popliteal Phasic Y     Right Popliteal Augment Y     Right Popliteal Competent Y     Right Popliteal Compress C     Right Posterior Tibial Compress C     Right Peroneal Compress C     Right GastronemiusSoleal Compress C     Right Greater Saph AK Compress C     Right Greater Saph BK Compress C     Left Common Femoral Spont Y     Left Common Femoral Phasic Y     Left Common Femoral Augment Y     Left Common Femoral Competent Y     Left Common Femoral Compress C     Left Saphenofemoral Junction Spont Y     Left Saphenofemoral Junction Phasic Y     Left Saphenofemoral Junction Augment Y     Left Saphenofemoral Junction Compress C     Left Proximal Femoral Compress C     Left Mid Femoral Spont Y     Left Mid Femoral Phasic Y     Left Mid Femoral Augment Y     Left Mid Femoral Competent Y     Left Mid Femoral Compress C     Left Distal Femoral Compress C     Left Popliteal Spont Y     Left Popliteal Phasic Y     Left Popliteal Augment Y     Left Popliteal Competent Y     Left Popliteal Compress C     Left Posterior Tibial Compress C     Left Peroneal Compress C     Left GastronemiusSoleal Compress C     Left Greater Saph AK Compress C     Left Greater  Saph BK Compress C    Hepatitis B Surface Antigen    Collection Time: 02/06/18  4:56 PM   Result Value Ref Range    Hepatitis B Surface Ag Non-Reactive Non-Reactive   POC Glucose Once    Collection Time: 02/06/18  5:23 PM   Result Value Ref Range    Glucose 84 70 - 130 mg/dL   Blood Gas, Arterial    Collection Time: 02/06/18  7:46 PM   Result Value Ref Range    Site Arterial: right radial     Loi's Test Positive     pH, Arterial 7.385 7.350 - 7.450 pH units    pCO2, Arterial 42.1 35.0 - 45.0 mm Hg    pO2, Arterial 71.8 (L) 80.0 - 100.0 mm Hg    HCO3, Arterial 25.2 22.0 - 28.0 mmol/L    Base Excess, Arterial 0.1 0.0 - 2.0 mmol/L    O2 Saturation Calculated 93.9 92.0 - 99.0 %    A-a Gradiant 0.3 mmHg    Barometric Pressure for Blood Gas 758.6 mmHg    Modality BiPap     FIO2 35 %    Set [facility] Resp Rate 20     Rate 22 Breaths/minute   POC Glucose Once    Collection Time: 02/06/18  8:37 PM   Result Value Ref Range    Glucose 89 70 - 130 mg/dL   Hemoglobin & Hematocrit, Blood    Collection Time: 02/06/18 10:12 PM   Result Value Ref Range    Hemoglobin 8.2 (L) 13.7 - 17.6 g/dL    Hematocrit 26.5 (L) 40.4 - 52.2 %   Hemoglobin & Hematocrit, Blood    Collection Time: 02/07/18  5:58 AM   Result Value Ref Range    Hemoglobin 8.2 (L) 13.7 - 17.6 g/dL    Hematocrit 26.9 (L) 40.4 - 52.2 %   Renal Function Panel    Collection Time: 02/07/18  5:58 AM   Result Value Ref Range    Glucose 98 65 - 99 mg/dL    BUN 29 (H) 8 - 23 mg/dL    Creatinine 2.40 (H) 0.76 - 1.27 mg/dL    Sodium 139 136 - 145 mmol/L    Potassium 3.9 3.5 - 5.2 mmol/L    Chloride 102 98 - 107 mmol/L    CO2 24.3 22.0 - 29.0 mmol/L    Calcium 8.0 (L) 8.6 - 10.5 mg/dL    Albumin 2.20 (L) 3.50 - 5.20 g/dL    Phosphorus 3.8 2.5 - 4.5 mg/dL    Anion Gap 12.7 mmol/L    BUN/Creatinine Ratio 12.1 7.0 - 25.0    eGFR Non African Amer 27 (L) >60 mL/min/1.73   Prepare RBC, 1 Units    Collection Time: 02/07/18  6:00 AM   Result Value Ref Range    Product Code [id number]     Unit  Number [id number]     UNIT  ABO A     UNIT  RH POS     Dispense Status PT     Blood Type APOS     Blood Expiration Date [id number]     Blood Type Barcode 6200    ]      Assessment/Plan           Active Problems:    Acute renal failure (ARF)      Assessment & Plan  67 y.o. male with acute renal failure and likely peripheral artery disease as well as lower extremity cellulitis.  Workup underway.  We'll need palindrome catheter placement when stabilized.  Tolerating dialysis.      Nitin Coronel MD  02/07/18  7:27 AM

## 2018-02-08 ENCOUNTER — ANESTHESIA EVENT (OUTPATIENT)
Dept: PERIOP | Facility: HOSPITAL | Age: 68
End: 2018-02-08

## 2018-02-08 ENCOUNTER — APPOINTMENT (OUTPATIENT)
Dept: GENERAL RADIOLOGY | Facility: HOSPITAL | Age: 68
End: 2018-02-08

## 2018-02-08 ENCOUNTER — ANESTHESIA (OUTPATIENT)
Dept: PERIOP | Facility: HOSPITAL | Age: 68
End: 2018-02-08

## 2018-02-08 ENCOUNTER — INPATIENT HOSPITAL (OUTPATIENT)
Dept: URBAN - METROPOLITAN AREA HOSPITAL 113 | Facility: HOSPITAL | Age: 68
End: 2018-02-08
Payer: MEDICARE

## 2018-02-08 DIAGNOSIS — K22.10 ULCER OF ESOPHAGUS WITHOUT BLEEDING: ICD-10-CM

## 2018-02-08 DIAGNOSIS — N18.6 END STAGE RENAL DISEASE: ICD-10-CM

## 2018-02-08 DIAGNOSIS — D64.9 ANEMIA, UNSPECIFIED: ICD-10-CM

## 2018-02-08 LAB
ALBUMIN SERPL-MCNC: 2.4 G/DL (ref 3.5–5.2)
ANION GAP SERPL CALCULATED.3IONS-SCNC: 8.5 MMOL/L
BUN BLD-MCNC: 36 MG/DL (ref 8–23)
BUN/CREAT SERPL: 12.9 (ref 7–25)
CALCIUM SPEC-SCNC: 8 MG/DL (ref 8.6–10.5)
CHLORIDE SERPL-SCNC: 102 MMOL/L (ref 98–107)
CO2 SERPL-SCNC: 27.5 MMOL/L (ref 22–29)
CREAT BLD-MCNC: 2.78 MG/DL (ref 0.76–1.27)
DEPRECATED RDW RBC AUTO: 49.2 FL (ref 37–54)
ERYTHROCYTE [DISTWIDTH] IN BLOOD BY AUTOMATED COUNT: 15 % (ref 11.5–14.5)
GFR SERPL CREATININE-BSD FRML MDRD: 23 ML/MIN/1.73
GLUCOSE BLD-MCNC: 111 MG/DL (ref 65–99)
GLUCOSE BLDC GLUCOMTR-MCNC: 102 MG/DL (ref 70–130)
GLUCOSE BLDC GLUCOMTR-MCNC: 123 MG/DL (ref 70–130)
GLUCOSE BLDC GLUCOMTR-MCNC: 135 MG/DL (ref 70–130)
GLUCOSE BLDC GLUCOMTR-MCNC: 80 MG/DL (ref 70–130)
HCT VFR BLD AUTO: 26.9 % (ref 40.4–52.2)
HGB BLD-MCNC: 8 G/DL (ref 13.7–17.6)
MCH RBC QN AUTO: 27.3 PG (ref 27–32.7)
MCHC RBC AUTO-ENTMCNC: 29.7 G/DL (ref 32.6–36.4)
MCV RBC AUTO: 91.8 FL (ref 79.8–96.2)
PHOSPHATE SERPL-MCNC: 4.8 MG/DL (ref 2.5–4.5)
PLATELET # BLD AUTO: 239 10*3/MM3 (ref 140–500)
PMV BLD AUTO: 10.7 FL (ref 6–12)
POTASSIUM BLD-SCNC: 3.9 MMOL/L (ref 3.5–5.2)
RBC # BLD AUTO: 2.93 10*6/MM3 (ref 4.6–6)
SODIUM BLD-SCNC: 138 MMOL/L (ref 136–145)
WBC NRBC COR # BLD: 5.73 10*3/MM3 (ref 4.5–10.7)

## 2018-02-08 PROCEDURE — 99231 SBSQ HOSP IP/OBS SF/LOW 25: CPT

## 2018-02-08 PROCEDURE — 25010000002 FENTANYL CITRATE (PF) 100 MCG/2ML SOLUTION: Performed by: ANESTHESIOLOGY

## 2018-02-08 PROCEDURE — 77001 FLUOROGUIDE FOR VEIN DEVICE: CPT

## 2018-02-08 PROCEDURE — 0JH63XZ INSERTION OF TUNNELED VASCULAR ACCESS DEVICE INTO CHEST SUBCUTANEOUS TISSUE AND FASCIA, PERCUTANEOUS APPROACH: ICD-10-PCS | Performed by: SURGERY

## 2018-02-08 PROCEDURE — 25010000002 PROPOFOL 10 MG/ML EMULSION: Performed by: ANESTHESIOLOGY

## 2018-02-08 PROCEDURE — 25010000002 MIDAZOLAM PER 1 MG: Performed by: ANESTHESIOLOGY

## 2018-02-08 PROCEDURE — 94760 N-INVAS EAR/PLS OXIMETRY 1: CPT

## 2018-02-08 PROCEDURE — 94799 UNLISTED PULMONARY SVC/PX: CPT

## 2018-02-08 PROCEDURE — 85027 COMPLETE CBC AUTOMATED: CPT | Performed by: INTERNAL MEDICINE

## 2018-02-08 PROCEDURE — 25010000002 HEPARIN (PORCINE) PER 1000 UNITS: Performed by: SURGERY

## 2018-02-08 PROCEDURE — C1750 CATH, HEMODIALYSIS,LONG-TERM: HCPCS | Performed by: SURGERY

## 2018-02-08 PROCEDURE — 25010000003 CEFAZOLIN IN DEXTROSE 2-4 GM/100ML-% SOLUTION: Performed by: ANESTHESIOLOGY

## 2018-02-08 PROCEDURE — 63710000001 INSULIN ASPART PER 5 UNITS: Performed by: HOSPITALIST

## 2018-02-08 PROCEDURE — 02H633Z INSERTION OF INFUSION DEVICE INTO RIGHT ATRIUM, PERCUTANEOUS APPROACH: ICD-10-PCS | Performed by: SURGERY

## 2018-02-08 PROCEDURE — 80069 RENAL FUNCTION PANEL: CPT | Performed by: INTERNAL MEDICINE

## 2018-02-08 PROCEDURE — 82962 GLUCOSE BLOOD TEST: CPT

## 2018-02-08 PROCEDURE — B244ZZZ ULTRASONOGRAPHY OF RIGHT HEART: ICD-10-PCS | Performed by: SURGERY

## 2018-02-08 PROCEDURE — 25010000002 PROPOFOL 1000 MG/ML EMULSION: Performed by: ANESTHESIOLOGY

## 2018-02-08 DEVICE — KT CATH TAL PALINDROM 2LUM WSLOT 14.5F23: Type: IMPLANTABLE DEVICE | Status: FUNCTIONAL

## 2018-02-08 RX ORDER — NALOXONE HCL 0.4 MG/ML
0.4 VIAL (ML) INJECTION AS NEEDED
Status: DISCONTINUED | OUTPATIENT
Start: 2018-02-08 | End: 2018-02-08 | Stop reason: HOSPADM

## 2018-02-08 RX ORDER — CEFAZOLIN SODIUM 2 G/100ML
2 INJECTION, SOLUTION INTRAVENOUS ONCE
Status: DISCONTINUED | OUTPATIENT
Start: 2018-02-08 | End: 2018-02-13 | Stop reason: HOSPADM

## 2018-02-08 RX ORDER — TRAMADOL HYDROCHLORIDE 50 MG/1
50 TABLET ORAL EVERY 6 HOURS PRN
Status: DISCONTINUED | OUTPATIENT
Start: 2018-02-08 | End: 2018-02-13 | Stop reason: HOSPADM

## 2018-02-08 RX ORDER — MIDAZOLAM HYDROCHLORIDE 1 MG/ML
1 INJECTION INTRAMUSCULAR; INTRAVENOUS
Status: DISCONTINUED | OUTPATIENT
Start: 2018-02-08 | End: 2018-02-08 | Stop reason: HOSPADM

## 2018-02-08 RX ORDER — DIPHENHYDRAMINE HYDROCHLORIDE 50 MG/ML
6.25 INJECTION INTRAMUSCULAR; INTRAVENOUS
Status: DISCONTINUED | OUTPATIENT
Start: 2018-02-08 | End: 2018-02-08 | Stop reason: HOSPADM

## 2018-02-08 RX ORDER — LIDOCAINE HYDROCHLORIDE 20 MG/ML
INJECTION, SOLUTION INFILTRATION; PERINEURAL AS NEEDED
Status: DISCONTINUED | OUTPATIENT
Start: 2018-02-08 | End: 2018-02-08 | Stop reason: SURG

## 2018-02-08 RX ORDER — MIDAZOLAM HYDROCHLORIDE 1 MG/ML
2 INJECTION INTRAMUSCULAR; INTRAVENOUS
Status: DISCONTINUED | OUTPATIENT
Start: 2018-02-08 | End: 2018-02-08 | Stop reason: HOSPADM

## 2018-02-08 RX ORDER — SODIUM CHLORIDE 0.9 % (FLUSH) 0.9 %
1-10 SYRINGE (ML) INJECTION AS NEEDED
Status: DISCONTINUED | OUTPATIENT
Start: 2018-02-08 | End: 2018-02-08 | Stop reason: HOSPADM

## 2018-02-08 RX ORDER — ONDANSETRON 2 MG/ML
4 INJECTION INTRAMUSCULAR; INTRAVENOUS ONCE AS NEEDED
Status: DISCONTINUED | OUTPATIENT
Start: 2018-02-08 | End: 2018-02-08 | Stop reason: HOSPADM

## 2018-02-08 RX ORDER — EPHEDRINE SULFATE 50 MG/ML
5 INJECTION, SOLUTION INTRAVENOUS ONCE AS NEEDED
Status: DISCONTINUED | OUTPATIENT
Start: 2018-02-08 | End: 2018-02-08 | Stop reason: HOSPADM

## 2018-02-08 RX ORDER — PROPOFOL 10 MG/ML
VIAL (ML) INTRAVENOUS AS NEEDED
Status: DISCONTINUED | OUTPATIENT
Start: 2018-02-08 | End: 2018-02-08 | Stop reason: SURG

## 2018-02-08 RX ORDER — LABETALOL HYDROCHLORIDE 5 MG/ML
5 INJECTION, SOLUTION INTRAVENOUS
Status: DISCONTINUED | OUTPATIENT
Start: 2018-02-08 | End: 2018-02-08 | Stop reason: HOSPADM

## 2018-02-08 RX ORDER — CEFAZOLIN SODIUM 2 G/100ML
INJECTION, SOLUTION INTRAVENOUS AS NEEDED
Status: DISCONTINUED | OUTPATIENT
Start: 2018-02-08 | End: 2018-02-08 | Stop reason: SURG

## 2018-02-08 RX ORDER — HYDRALAZINE HYDROCHLORIDE 20 MG/ML
5 INJECTION INTRAMUSCULAR; INTRAVENOUS
Status: DISCONTINUED | OUTPATIENT
Start: 2018-02-08 | End: 2018-02-08 | Stop reason: HOSPADM

## 2018-02-08 RX ORDER — FENTANYL CITRATE 50 UG/ML
INJECTION, SOLUTION INTRAMUSCULAR; INTRAVENOUS AS NEEDED
Status: DISCONTINUED | OUTPATIENT
Start: 2018-02-08 | End: 2018-02-08 | Stop reason: SURG

## 2018-02-08 RX ORDER — HEPARIN SODIUM 1000 [USP'U]/ML
INJECTION, SOLUTION INTRAVENOUS; SUBCUTANEOUS AS NEEDED
Status: DISCONTINUED | OUTPATIENT
Start: 2018-02-08 | End: 2018-02-08 | Stop reason: HOSPADM

## 2018-02-08 RX ORDER — FENTANYL CITRATE 50 UG/ML
25 INJECTION, SOLUTION INTRAMUSCULAR; INTRAVENOUS
Status: DISCONTINUED | OUTPATIENT
Start: 2018-02-08 | End: 2018-02-08 | Stop reason: HOSPADM

## 2018-02-08 RX ORDER — SODIUM CHLORIDE 9 MG/ML
9 INJECTION, SOLUTION INTRAVENOUS CONTINUOUS PRN
Status: DISCONTINUED | OUTPATIENT
Start: 2018-02-08 | End: 2018-02-08 | Stop reason: HOSPADM

## 2018-02-08 RX ADMIN — HYDROCODONE BITARTRATE AND ACETAMINOPHEN 1 TABLET: 5; 325 TABLET ORAL at 23:39

## 2018-02-08 RX ADMIN — MIDAZOLAM 1 MG: 1 INJECTION INTRAMUSCULAR; INTRAVENOUS at 09:51

## 2018-02-08 RX ADMIN — SODIUM CHLORIDE 9 ML/HR: 9 INJECTION, SOLUTION INTRAVENOUS at 09:51

## 2018-02-08 RX ADMIN — RISPERIDONE 2 MG: 1 TABLET ORAL at 22:08

## 2018-02-08 RX ADMIN — HYDRALAZINE HYDROCHLORIDE 50 MG: 50 TABLET, FILM COATED ORAL at 22:07

## 2018-02-08 RX ADMIN — CITALOPRAM 20 MG: 20 TABLET, FILM COATED ORAL at 09:03

## 2018-02-08 RX ADMIN — TRAZODONE HYDROCHLORIDE 100 MG: 100 TABLET, FILM COATED ORAL at 22:07

## 2018-02-08 RX ADMIN — BUPROPION HYDROCHLORIDE 200 MG: 100 TABLET, EXTENDED RELEASE ORAL at 09:03

## 2018-02-08 RX ADMIN — FENTANYL CITRATE 25 MCG: 50 INJECTION INTRAMUSCULAR; INTRAVENOUS at 09:51

## 2018-02-08 RX ADMIN — GABAPENTIN 300 MG: 300 CAPSULE ORAL at 09:03

## 2018-02-08 RX ADMIN — AMLODIPINE BESYLATE 10 MG: 10 TABLET ORAL at 09:03

## 2018-02-08 RX ADMIN — CLONIDINE HYDROCHLORIDE 0.2 MG: 0.1 TABLET ORAL at 22:07

## 2018-02-08 RX ADMIN — LIDOCAINE HYDROCHLORIDE 60 MG: 20 INJECTION, SOLUTION INFILTRATION; PERINEURAL at 10:29

## 2018-02-08 RX ADMIN — CEFAZOLIN SODIUM 2 G: 2 INJECTION, SOLUTION INTRAVENOUS at 10:33

## 2018-02-08 RX ADMIN — BUPROPION HYDROCHLORIDE 200 MG: 100 TABLET, EXTENDED RELEASE ORAL at 22:07

## 2018-02-08 RX ADMIN — HYDROCODONE BITARTRATE AND ACETAMINOPHEN 1 TABLET: 5; 325 TABLET ORAL at 19:41

## 2018-02-08 RX ADMIN — IPRATROPIUM BROMIDE AND ALBUTEROL SULFATE 3 ML: .5; 3 SOLUTION RESPIRATORY (INHALATION) at 07:08

## 2018-02-08 RX ADMIN — GABAPENTIN 300 MG: 300 CAPSULE ORAL at 22:07

## 2018-02-08 RX ADMIN — HYDROCODONE BITARTRATE AND ACETAMINOPHEN 1 TABLET: 5; 325 TABLET ORAL at 00:55

## 2018-02-08 RX ADMIN — HYDROCODONE BITARTRATE AND ACETAMINOPHEN 1 TABLET: 5; 325 TABLET ORAL at 04:40

## 2018-02-08 RX ADMIN — ARFORMOTEROL TARTRATE 15 MCG: 15 SOLUTION RESPIRATORY (INHALATION) at 23:42

## 2018-02-08 RX ADMIN — METOPROLOL TARTRATE 50 MG: 50 TABLET, FILM COATED ORAL at 22:07

## 2018-02-08 RX ADMIN — METOPROLOL TARTRATE 50 MG: 50 TABLET, FILM COATED ORAL at 09:03

## 2018-02-08 RX ADMIN — INSULIN DETEMIR 30 UNITS: 100 INJECTION, SOLUTION SUBCUTANEOUS at 22:08

## 2018-02-08 RX ADMIN — PANTOPRAZOLE SODIUM 40 MG: 40 TABLET, DELAYED RELEASE ORAL at 06:35

## 2018-02-08 RX ADMIN — PROPOFOL 40 MG: 10 INJECTION, EMULSION INTRAVENOUS at 10:31

## 2018-02-08 RX ADMIN — BUDESONIDE 0.25 MG: 0.25 INHALANT RESPIRATORY (INHALATION) at 23:37

## 2018-02-08 RX ADMIN — HYDROCODONE BITARTRATE AND ACETAMINOPHEN 1 TABLET: 5; 325 TABLET ORAL at 14:55

## 2018-02-08 RX ADMIN — CLONIDINE HYDROCHLORIDE 0.2 MG: 0.1 TABLET ORAL at 09:03

## 2018-02-08 RX ADMIN — SODIUM BICARBONATE 650 MG: 650 TABLET ORAL at 22:07

## 2018-02-08 RX ADMIN — VITAMIN D, TAB 1000IU (100/BT) 1000 UNITS: 25 TAB at 09:03

## 2018-02-08 RX ADMIN — PROPOFOL 50 MCG/KG/MIN: 10 INJECTION, EMULSION INTRAVENOUS at 10:31

## 2018-02-08 RX ADMIN — TAMSULOSIN HYDROCHLORIDE 0.4 MG: 0.4 CAPSULE ORAL at 22:07

## 2018-02-08 RX ADMIN — FENTANYL CITRATE 50 MCG: 50 INJECTION INTRAMUSCULAR; INTRAVENOUS at 10:28

## 2018-02-08 RX ADMIN — ASPIRIN 81 MG: 81 TABLET, CHEWABLE ORAL at 14:54

## 2018-02-08 RX ADMIN — HYDRALAZINE HYDROCHLORIDE 50 MG: 50 TABLET, FILM COATED ORAL at 06:35

## 2018-02-08 RX ADMIN — SODIUM BICARBONATE 650 MG: 650 TABLET ORAL at 09:03

## 2018-02-08 NOTE — PROGRESS NOTES
"   LOS: 3 days   Patient Care Team:  Alicia Harley MD as PCP - General (Family Medicine)    Chief Complaint/ Reason for encounter: Acute renal failure, dialysis management      Subjective     History of Present Illness    Subjective:  Symptoms:  Stable.  He reports weakness.  No shortness of breath or chest pain.  (Resting, shortness of breath continues to improve  Off BiPAP, tolerating dialysis well  Tunnel dialysis catheter placed today ).    Diet:  Adequate intake.  No nausea.    Activity level: Returning to normal.    Pain:  He reports no pain.          History taken from: Patient and chart    Objective     Vital Signs  Temp:  [95.1 °F (35.1 °C)-98.1 °F (36.7 °C)] 95.1 °F (35.1 °C)  Heart Rate:  [57-69] 57  Resp:  [16-18] 18  BP: (119-157)/(72-85) 135/80       Wt Readings from Last 1 Encounters:   02/06/18 1409 83.7 kg (184 lb 8.4 oz)       Objective:  General Appearance:  Comfortable, in no acute distress and not in pain (More alert and responsive).    Vital signs: (most recent): Blood pressure 135/80, pulse 57, temperature 95.1 °F (35.1 °C), temperature source Oral, resp. rate 18, height 182.9 cm (72.01\"), weight 83.7 kg (184 lb 8.4 oz), SpO2 94 %.  Vital signs are normal.  No fever.    Output: Producing urine.    HEENT: Normal HEENT exam.  (Right chest CVC in place)    Lungs:  Normal respiratory rate and normal effort.  He is not in respiratory distress.  Breath sounds clear to auscultation.  There are decreased breath sounds.  No rhonchi.  (Improved aeration)  Heart: Normal rate.  Regular rhythm.  No murmur.   Abdomen: Abdomen is soft and non-distended.  Bowel sounds are normal.   There is no abdominal tenderness.  There is no epigastric area or no suprapubic area tenderness.     Extremities: Normal range of motion.  There is dependent edema.  There is no deformity.  (Decreased edema)  Pulses: Distal pulses are intact.    Skin:  Warm and dry.  No rash or cyanosis.             Results Review:    Past " Medical History: reviewed and updated  Past Medical History:   Diagnosis Date   • COPD (chronic obstructive pulmonary disease)    • Diabetes mellitus    • Hypertension          Allergies:  No Known Allergies    Intake/Output:     Intake/Output Summary (Last 24 hours) at 02/08/18 1533  Last data filed at 02/08/18 1059   Gross per 24 hour   Intake              460 ml   Output             1775 ml   Net            -1315 ml         DATA:  Radiology and Labs:  The following labs independently reviewed by me  Old records independently reviewed showing Progressive chronic kidney disease, progression to end-stage renal disease  Interval notes, chart personally reviewed by me.   New problems include worsening hypertension             Labs:   Recent Results (from the past 24 hour(s))   POC Glucose Once    Collection Time: 02/07/18  4:15 PM   Result Value Ref Range    Glucose 323 (H) 70 - 130 mg/dL   POC Glucose Once    Collection Time: 02/07/18  4:57 PM   Result Value Ref Range    Glucose 345 (H) 70 - 130 mg/dL   POC Glucose Once    Collection Time: 02/07/18  9:00 PM   Result Value Ref Range    Glucose 476 (C) 70 - 130 mg/dL   POC Glucose Once    Collection Time: 02/07/18  9:01 PM   Result Value Ref Range    Glucose 435 (H) 70 - 130 mg/dL   Renal Function Panel    Collection Time: 02/08/18  5:14 AM   Result Value Ref Range    Glucose 111 (H) 65 - 99 mg/dL    BUN 36 (H) 8 - 23 mg/dL    Creatinine 2.78 (H) 0.76 - 1.27 mg/dL    Sodium 138 136 - 145 mmol/L    Potassium 3.9 3.5 - 5.2 mmol/L    Chloride 102 98 - 107 mmol/L    CO2 27.5 22.0 - 29.0 mmol/L    Calcium 8.0 (L) 8.6 - 10.5 mg/dL    Albumin 2.40 (L) 3.50 - 5.20 g/dL    Phosphorus 4.8 (H) 2.5 - 4.5 mg/dL    Anion Gap 8.5 mmol/L    BUN/Creatinine Ratio 12.9 7.0 - 25.0    eGFR Non African Amer 23 (L) >60 mL/min/1.73   CBC (No Diff)    Collection Time: 02/08/18  5:14 AM   Result Value Ref Range    WBC 5.73 4.50 - 10.70 10*3/mm3    RBC 2.93 (L) 4.60 - 6.00 10*6/mm3    Hemoglobin  8.0 (L) 13.7 - 17.6 g/dL    Hematocrit 26.9 (L) 40.4 - 52.2 %    MCV 91.8 79.8 - 96.2 fL    MCH 27.3 27.0 - 32.7 pg    MCHC 29.7 (L) 32.6 - 36.4 g/dL    RDW 15.0 (H) 11.5 - 14.5 %    RDW-SD 49.2 37.0 - 54.0 fl    MPV 10.7 6.0 - 12.0 fL    Platelets 239 140 - 500 10*3/mm3   POC Glucose Once    Collection Time: 02/08/18  7:33 AM   Result Value Ref Range    Glucose 102 70 - 130 mg/dL   POC Glucose Once    Collection Time: 02/08/18 12:27 PM   Result Value Ref Range    Glucose 80 70 - 130 mg/dL       Radiology:  Imaging Results (last 24 hours)     Procedure Component Value Units Date/Time    IR PICC W Fluoro Surgery Only [[id number]] Updated:  02/08/18 1057             Medications have been reviewed:  Current Facility-Administered Medications   Medication Dose Route Frequency Provider Last Rate Last Dose   • acetaminophen (TYLENOL) tablet 650 mg  650 mg Oral Q6H PRN Hank Joaquin MD       • amLODIPine (NORVASC) tablet 10 mg  10 mg Oral Q24H Hank Joaquin MD   10 mg at 02/08/18 0903   • arformoterol (BROVANA) nebulizer solution 15 mcg  15 mcg Nebulization BID - RT Tony Mccauley MD   15 mcg at 02/07/18 2053   • aspirin chewable tablet 81 mg  81 mg Oral Daily Hank Joaquin MD   81 mg at 02/08/18 1454   • budesonide (PULMICORT) nebulizer solution 0.25 mg  0.25 mg Nebulization BID - RT Tony Mccauley MD   0.25 mg at 02/07/18 2053   • buPROPion SR (WELLBUTRIN SR) 12 hr tablet 200 mg  200 mg Oral Q12H Hnak Joaquin MD   200 mg at 02/08/18 0903   • ceFAZolin in dextrose (ANCEF) IVPB solution 2 g  2 g Intravenous Once Nitin Coronel MD       • cholecalciferol (VITAMIN D3) tablet 1,000 Units  1,000 Units Oral Daily Hank Joaquin MD   1,000 Units at 02/08/18 0903   • citalopram (CeleXA) tablet 20 mg  20 mg Oral Daily Hank Joaquin MD   20 mg at 02/08/18 0903   • CloNIDine (CATAPRES) tablet 0.2 mg  0.2 mg Oral Q12H Hank Joaquin MD   0.2 mg at 02/08/18 0903   • dextrose  (D50W) solution 25 g  25 g Intravenous Q15 Min PRN Danilo Del Valle MD       • dextrose (GLUTOSE) oral gel 15 g  15 g Oral Q15 Min PRN Danilo Del Valle MD       • epoetin chloe (EPOGEN,PROCRIT) injection 20,000 Units  20,000 Units Subcutaneous Weekly Hank Joaquin MD   20,000 Units at 02/06/18 1844   • gabapentin (NEURONTIN) capsule 300 mg  300 mg Oral Q12H Hank Joaquin MD   300 mg at 02/08/18 0903   • glucagon (human recombinant) (GLUCAGEN DIAGNOSTIC) injection 1 mg  1 mg Subcutaneous PRN Danilo Del Valle MD       • heparin (porcine) injection 3,000 Units  3,000 Units Intracatheter Once Tutu Smith MD       • hydrALAZINE (APRESOLINE) tablet 50 mg  50 mg Oral Q8H Hank Joaquin MD   50 mg at 02/08/18 0635   • hydrocerin (EUCERIN) cream   Topical Daily He Strange MD       • HYDROcodone-acetaminophen (NORCO) 5-325 MG per tablet 1 tablet  1 tablet Oral Q4H PRN Hakn Joaquin MD   1 tablet at 02/08/18 1455   • insulin aspart (novoLOG) injection 0-9 Units  0-9 Units Subcutaneous 4x Daily With Meals & Nightly Danilo Del Valle MD   7 Units at 02/07/18 1739   • insulin aspart (novoLOG) injection 7 Units  7 Units Subcutaneous TID With Meals He Strange MD       • insulin detemir (LEVEMIR) injection 30 Units  30 Units Subcutaneous Nightly Alan Arnett MD   30 Units at 02/07/18 2140   • ipratropium-albuterol (DUO-NEB) nebulizer solution 3 mL  3 mL Nebulization Q6H While Awake - RT He Strange MD   3 mL at 02/08/18 0708   • iron sucrose (VENOFER) 200 mg in sodium chloride 0.9 % 100 mL IVPB  200 mg Intravenous Q24H Hank Joaquin  mL/hr at 02/07/18 1618 200 mg at 02/07/18 1618   • metoprolol tartrate (LOPRESSOR) tablet 50 mg  50 mg Oral Q12H Hank Joaquin MD   50 mg at 02/08/18 0903   • pantoprazole (PROTONIX) EC tablet 40 mg  40 mg Oral BID AC Danilo Hensley MD   40 mg at 02/08/18 0635   • risperiDONE  (risperDAL) tablet 2 mg  2 mg Oral Nightly Hank Joaquin MD   2 mg at 02/07/18 2029   • sodium bicarbonate tablet 650 mg  650 mg Oral TID Hank Joaquin MD   650 mg at 02/08/18 0903   • tamsulosin (FLOMAX) 24 hr capsule 0.4 mg  0.4 mg Oral Nightly Hank Joaquin MD   0.4 mg at 02/07/18 2029   • traMADol (ULTRAM) tablet 50 mg  50 mg Oral Q6H PRN Gage Saavedra Jr., MD       • traZODone (DESYREL) tablet 100 mg  100 mg Oral Nightly Hank Joaquin MD   100 mg at 02/07/18 2029       Assessment/Plan     Active Problems:    Acute renal failure (ARF)      Assessment:  (End-stage renal disease, initiated on hemodialysis  Anemia of chronic disease with iron deficiency  Lower Extremity edema, better with dialysis/ultrafiltration  Diabetes mellitus  Multiple skin ulcers, no antibiotics for vascular intervention needed per consultants  Decubitus ulcers  Deconditioning, physical therapy following  COPD  Hypertension, worse, poorly controlled  Tobacco abuse  Peripheral vascular disease         Plan:  Respiratory status and volume status much improved after dialysis  Appreciate assistance of  all Consultants  Dialysis today, continue Tuesday Thursday Saturday schedule for now  CPAP at night for sleep apnea per pulmonary service  Continue wound care to lower extremities  continue Epogen and IV iron  Continue current antihypertensive regimen  Will need outpatient dialysis set up at discharge   Patient already at subacute rehabilitation, patient plans to return to [facility] in [place] at discharge  Anticipate discharge soon, possibly by the weekend once outpatient dialysis is set up).             Continue to monitor renal function, electroytes and volume closely   Please call me with any questions or concerns      Hank Joaquin MD   [facility]   [phone]    02/08/18  3:33 PM      Dictation performed using Dragon dictation software

## 2018-02-08 NOTE — PLAN OF CARE
Problem: Patient Care Overview (Adult)  Goal: Plan of Care Review  Outcome: Ongoing (interventions implemented as appropriate)   02/08/18 0941   Coping/Psychosocial Response Interventions   Plan Of Care Reviewed With patient   Patient Care Overview   Progress no change   Outcome Evaluation   Outcome Summary/Follow up Plan preparing for surgery       Problem: Perioperative Period (Adult)  Goal: Signs and Symptoms of Listed Potential Problems Will be Absent or Manageable (Perioperative Period)  Outcome: Ongoing (interventions implemented as appropriate)   02/08/18 0941   Perioperative Period   Problems Assessed (Perioperative Period) pain;infection;situational response   Problems Present (Perioperative Period) pain

## 2018-02-08 NOTE — PROGRESS NOTES
GI Daily Progress Note    Assessment/Plan:    Active Problems:    Acute renal failure (ARF)       LOS: 3 days     Kevin Mckeon V is a 67 y.o. male who was admitted with Respiratory failure / Volume overlaod. He reports his symptoms are improving with treatment. Following for Ulcerative esophagitis and Anemia, HGB stable Pt eating well infact put on caloric restriction. Had dialysis cath tunneled in today and plans for HD this afternoon.    Subjective:    Patient expresses No GI complaints  Patient denies abdominal pain and loss of appetite    Objective:    Vital signs in last 24 hours:  Temp:  [97.4 °F (36.3 °C)-98.1 °F (36.7 °C)] 97.5 °F (36.4 °C)  Heart Rate:  [57-69] 57  Resp:  [16-18] 16  BP: (119-157)/(72-85) 135/77    Intake/Output last 3 shifts:  I/O last 3 completed shifts:  In: 1020 [P.O.:720; Blood:300]  Out: 9325 [Urine:6325; Other:3000]  Intake/Output this shift:  I/O this shift:  In: 100 [I.V.:100]  Out: -     Results from last 7 days  Lab Units 02/08/18  0514 02/07/18  0558 02/06/18  2212  02/06/18  0440   WBC 10*3/mm3 5.73  --   --   --  7.48   HEMOGLOBIN g/dL 8.0* 8.2* 8.2*  < > 6.9*   HEMATOCRIT % 26.9* 26.9* 26.5*  < > 23.1*   PLATELETS 10*3/mm3 239  --   --   --  266   < > = values in this interval not displayed.     Results from last 7 days  Lab Units 02/08/18  0514 02/07/18  0558 02/06/18  0440   SODIUM mmol/L 138 139 135*   POTASSIUM mmol/L 3.9 3.9 4.9   CHLORIDE mmol/L 102 102 100   CO2 mmol/L 27.5 24.3 22.6   BUN mg/dL 36* 29* 63*   CREATININE mg/dL 2.78* 2.40* 3.85*   GLUCOSE mg/dL 111* 98 258*   CALCIUM mg/dL 8.0* 8.0* 8.3*       Physical Exam:Abdomen  Sounds Normal Active Bowel Sounds   Distension Soft and Distended   Tenderness Nontender     Anemia  ESRD  Ulcerative esophagitis  DM\  Bilat LE Ulcers  History of Paranoid Shezophrenia  Volume Overload/Respiratory failure    Just following for now has received Procrit and is on BID PPI expect HGB to be improved after dialysis today.

## 2018-02-08 NOTE — PROGRESS NOTES
LOS: 3 days   Patient Care Team:  Alicia Harley MD as PCP - General (Family Medicine)    Chief Complaint: Acute renal failure    Subjective     67 y.o. male with acute renal failure and lower extremity ulcers and cellulitis.  His vein mapping shows only 1 reasonable vein which is his right basilic.  We will try to preserve this.  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  His legs have improved significantly.  His arterial testing was normal is within normal limits.  We will plan on a palindromic catheter today.  Long-term placement of right basilic vein transposition AV fistula will be determined by Dr. Joaquin    Review of Systems  Review of Systems - Negative except mild shortness of air      Objective     Vital Signs  Temp:  [97.4 °F (36.3 °C)-98.4 °F (36.9 °C)] 97.4 °F (36.3 °C)  Heart Rate:  [63-73] 63  Resp:  [16-18] 18  BP: (144-173)/(77-91) 145/80    Physical Exam  General: No acute distress. Alert and oriented x 4  HEENT: No jugular venous distension, trachea is midline  CV: RRR, S1S2  Resp: Course mildly labored breathing on both sides  Abd: Abdomen is soft, nontender, nondistended  Extremities: Viable with multiple ulcers cellulitis and thin skin on both lower extremities.  Prior amputation sites of the toes noted.  Nonpalpable pulses.    Results Review:       Recent Results (from the past 24 hour(s))   POC Glucose Once    Collection Time: 02/07/18 11:16 AM   Result Value Ref Range    Glucose 111 70 - 130 mg/dL   Duplex Initial Vein Mapping Hemodialysis Access CAR    Collection Time: 02/07/18 11:20 AM   Result Value Ref Range    Cephalic upper arm right - prox 0.21 cm    Cephalic upper arm right - mid 0.20 cm    Cephalic upper arm right - dist 0.20 cm    Cephalic Forearm right - prox 0.29 cm    Cephalic Forearm right - mid 0.24 cm    Cephalic Forearm right - dist 0.23 cm    Basilic upper arm right -  prox 0.50 cm    Basilic upper arm right - mid 0.37 cm    Basilic upper arm right - dist 0.38 cm    Basilic Forearm right - prox 0.24 cm    Basilic Forearm right - mid 0.20 cm    Basilic Forearm right - dist 0.32 cm    Radial upper arm right - prox 0.29 cm    Radial upper arm right - mid 0.26 cm    Radial upper arm right - dist 0.25 cm    Cephalic upper arm left - prox 0.17 cm    Cephalic upper arm left - mid 0.12 cm    Cephalic upper arm left - dist 0.13 cm    Cephalic Forearm left - prox 0.18 cm    Cephalic Forearm left - mid 0.22 cm    Basilic upper arm left - prox 0.37 cm    Basilic upper arm left - mid 0.34 cm    Basilic upper arm left - dist 0.28 cm    Basilic Forearm left - prox 0.21 cm    Basilic Forearm left - mid 0.18 cm    Basilic Forearm left - dist 0.16 cm    Radial upper arm left - prox 0.27 cm    Radial upper arm left - mid 0.19 cm    Radial upper arm left - dist 0.25 cm    Upper arterial right arm brachial length 0.61 cm    Upper arterial left arm brachial length 0.53 cm    Basilic Antecubital Fossa Left 0.20 cm    Basilic Antecubital Fossa Right 0.27 cm    Cephalic Antecubital Fossa Left 0.22 cm    Cephalic Antecubital Fossa Right 0.28 cm   Doppler Arterial Multi Level Lower Extremity - Bilateral CAR    Collection Time: 02/07/18 11:29 AM   Result Value Ref Range    RIGHT LOW THIGH SYS  mmHg    RIGHT POPLITEAL SYS  mmHg    RIGHT DORSALIS PEDIS SYS  mmHg    RIGHT POST TIBIAL SYS  mmHg    RIGHT GREAT TOE SYS  mmHg    RIGHT FLAQUITA RATIO 1.3     RIGHT TBI RATIO 1.12     LEFT HIGH THIGH SYS  mmHg    LEFT LOW THIGH SYS  mmHg    LEFT POPLITEAL SYS  mmHg    LEFT DORSALIS PEDIS SYS  mmHg    LEFT POST TIBIAL SYS  mmHg    LEFT GREAT TOE SYS  mmHg    LEFT FLAQUITA RATIO 1.3     LEFT TBI RATIO 1.31     Upper arterial right arm brachial sys max 169 mmHg    Upper arterial left arm brachial sys max 167 mmHg   POC Glucose Once    Collection Time:  02/07/18  4:15 PM   Result Value Ref Range    Glucose 323 (H) 70 - 130 mg/dL   POC Glucose Once    Collection Time: 02/07/18  4:57 PM   Result Value Ref Range    Glucose 345 (H) 70 - 130 mg/dL   POC Glucose Once    Collection Time: 02/07/18  9:00 PM   Result Value Ref Range    Glucose 476 (C) 70 - 130 mg/dL   POC Glucose Once    Collection Time: 02/07/18  9:01 PM   Result Value Ref Range    Glucose 435 (H) 70 - 130 mg/dL   Renal Function Panel    Collection Time: 02/08/18  5:14 AM   Result Value Ref Range    Glucose 111 (H) 65 - 99 mg/dL    BUN 36 (H) 8 - 23 mg/dL    Creatinine 2.78 (H) 0.76 - 1.27 mg/dL    Sodium 138 136 - 145 mmol/L    Potassium 3.9 3.5 - 5.2 mmol/L    Chloride 102 98 - 107 mmol/L    CO2 27.5 22.0 - 29.0 mmol/L    Calcium 8.0 (L) 8.6 - 10.5 mg/dL    Albumin 2.40 (L) 3.50 - 5.20 g/dL    Phosphorus 4.8 (H) 2.5 - 4.5 mg/dL    Anion Gap 8.5 mmol/L    BUN/Creatinine Ratio 12.9 7.0 - 25.0    eGFR Non African Amer 23 (L) >60 mL/min/1.73   CBC (No Diff)    Collection Time: 02/08/18  5:14 AM   Result Value Ref Range    WBC 5.73 4.50 - 10.70 10*3/mm3    RBC 2.93 (L) 4.60 - 6.00 10*6/mm3    Hemoglobin 8.0 (L) 13.7 - 17.6 g/dL    Hematocrit 26.9 (L) 40.4 - 52.2 %    MCV 91.8 79.8 - 96.2 fL    MCH 27.3 27.0 - 32.7 pg    MCHC 29.7 (L) 32.6 - 36.4 g/dL    RDW 15.0 (H) 11.5 - 14.5 %    RDW-SD 49.2 37.0 - 54.0 fl    MPV 10.7 6.0 - 12.0 fL    Platelets 239 140 - 500 10*3/mm3   POC Glucose Once    Collection Time: 02/08/18  7:33 AM   Result Value Ref Range    Glucose 102 70 - 130 mg/dL   ]      Assessment/Plan           Active Problems:    Acute renal failure (ARF)      Assessment & Plan  67 y.o. male with acute renal failure and likely peripheral artery disease as well as lower extremity cellulitis.  Plan palindrome catheter to the jugular veins today.  We'll get the right groin line out after that.  He will need eventual right basilic vein transposition of Dr. Joaquin agrees.  There is no arterial occlusive disease  to be further evaluated and his legs are improving with antibiotics.  We have very little to add in that area.  We'll follow with you.    Nitin Coronel MD  02/08/18  7:57 AM

## 2018-02-08 NOTE — ANESTHESIA PREPROCEDURE EVALUATION
Anesthesia Evaluation     Patient summary reviewed   NPO Solid Status: > 8 hours             Airway   Mallampati: II  Neck ROM: full  no difficulty expected  Dental    (+) edentulous    Pulmonary    (+) COPD,   Cardiovascular     Rhythm: regular    (+) hypertension,       Neuro/Psych  GI/Hepatic/Renal/Endo    (+)  diabetes mellitus,     Musculoskeletal     Abdominal    Substance History      OB/GYN          Other          Other Comment: Hb 8.0                  Anesthesia Plan    ASA 3     general     intravenous induction   Anesthetic plan and risks discussed with patient.  Use of blood products discussed with patient .

## 2018-02-08 NOTE — OP NOTE
Operative Note  Date of Admission:  2/5/2018  OR Date: 2/8/2018    Pre-op Diagnosis: ESRD in need of access for hemodialysis    Post-op Diagnosis:     Same    Procedure:  1) Tunneled dialysis catheter insertion (55855)  2) Ultrasound guided vascular access (31005)  3) Radiologic supervision of catheter tip placement (23674)    Assistant: None    Anesthesia: General    Estimated Blood Loss: minimal    Specimens: * No orders in the log *    Staff:   Circulator: Thuy Godfrey RN  Radiology Technologist: Sherley Canchola  Scrub Person: Mine Cross RN    Complications: None    Findings:  Tip in SVC/Atrial     Indications:    The patient is an 67 y.o. male referred for tunneled dialysis catheter placement.  The risks, benefits, and alternatives have been discussed with the patient and/or family and include but are not limited to injury to artery, vein, lung, bleeding, and infection.    Procedure:  The patient was taken to the operating room and placed supine on the operating room table. After the induction of IV sedation, the neck and chest were prepped and draped with ChloraPrep. The patient was placed in Trendelenburg position. Timeout was observed. The right  Internal Jugular  was evaluated on ultrasound and found to be easily compressible. The vessel was entered under direct ultrasound guidance and photographic documentation was recorded in the chart for the record. An angled wire was then advanced down into the superior vena cava under fluoroscopy without any difficulty. Exit site was chosen on the chest. The tract was anesthetized with 1% lidocaine mixed with 0.25% Marcaine. A 23 cm Palindrome catheter was then flushed and brought up onto the field. It was tunneled in an antegrade fashion up to the IJ insertion site. Dilator and peel-away sheath were then advanced over the wire under fluoroscopy without any significant difficulty. Dilator and wire were removed leaving the peel-away sheath in place. The  distal tip of the catheter was then placed into the peel-away sheath and the peel-away sheath was removed. Under fluoroscopy, the distal tip of the catheter was positioned into the right atrium. There was no kinking at the catheter insertion site. The catheter flushed and aspirated easily. The lung fields were examined. There was no evidence of hemothorax or pneumothorax. The catheter flushed and aspirated quite easily. It was locked with concentrated heparin. The catheter was then anchored to the chest with nylon sutures. A stitch and dermal glue were used to close the neck site as well. Sterile dressings were applied. The patient tolerated the procedure well. There were no immediately apparent complications.           Active Hospital Problems (** Indicates Principal Problem)    Diagnosis Date Noted   • Acute renal failure (ARF) [N17.9] 02/06/2018      Resolved Hospital Problems    Diagnosis Date Noted Date Resolved   No resolved problems to display.      Gage Saavedra Jr., MD     Date: 2/8/2018  Time: 10:56 AM

## 2018-02-08 NOTE — SIGNIFICANT NOTE
02/08/18 0909   Rehab Treatment   Discipline physical therapist   Treatment Not Performed patient unavailable for treatment  (Pt leaving floor for OR this am. WIll follow up tomorrow.)   Recommendation   PT - Next Appointment 02/09/18

## 2018-02-08 NOTE — ANESTHESIA POSTPROCEDURE EVALUATION
"Patient: Kevin Mckeon V    Procedure Summary     Date Anesthesia Start Anesthesia Stop Room / Location    02/08/18 1024 1107  [facility] / [facility]       Procedure Diagnosis Surgeon Provider    PALINDROME INSERTION (N/A ) No diagnosis on file. MD Hank Em Jr., MD          Anesthesia Type: general  Last vitals  BP   135/77 (02/08/18 1150)   Temp   36.4 °C (97.5 °F) (02/08/18 1150)   Pulse   57 (02/08/18 1150)   Resp   16 (02/08/18 1150)     SpO2   95 % (02/08/18 1150)     Post Anesthesia Care and Evaluation    Patient location during evaluation: bedside  Patient participation: complete - patient participated  Level of consciousness: awake and alert  Pain management: adequate  Airway patency: patent  Anesthetic complications: No anesthetic complications  PONV Status: none  Cardiovascular status: acceptable  Respiratory status: acceptable  Hydration status: acceptable    Comments: /77  Pulse 57  Temp 36.4 °C (97.5 °F) (Oral)   Resp 16  Ht 182.9 cm (72.01\")  Wt 83.7 kg (184 lb 8.4 oz)  SpO2 95%  BMI 25.02 kg/m2        "

## 2018-02-08 NOTE — PLAN OF CARE
Problem: Patient Care Overview (Adult)  Goal: Plan of Care Review  Outcome: Ongoing (interventions implemented as appropriate)   02/08/18 0520   Coping/Psychosocial Response Interventions   Plan Of Care Reviewed With patient   Patient Care Overview   Progress improving   Outcome Evaluation   Outcome Summary/Follow up Plan C/O BLE Pain and medicated q4 hours with some releif. BIPAP when sleeping and tolerating well. NPO since midnight for dialysis catheter placement today. Education given about importance of tight glycemic control. Glucose 435 at 8pm. Levemir increased and extra Novolog given. Elise catheter in place. Urine yellow and clear. 1275mL out put this shift.     Goal: Adult Individualization and Mutuality  Outcome: Ongoing (interventions implemented as appropriate)    Goal: Discharge Needs Assessment  Outcome: Ongoing (interventions implemented as appropriate)      Problem: Fall Risk (Adult)  Goal: Absence of Falls  Outcome: Ongoing (interventions implemented as appropriate)      Problem: Renal Failure/Kidney Injury, Acute (Adult)  Goal: Signs and Symptoms of Listed Potential Problems Will be Absent or Manageable (Renal Failure/Kidney Injury, Acute)  Outcome: Ongoing (interventions implemented as appropriate)      Problem: Diabetes, Type 2 (Adult)  Goal: Signs and Symptoms of Listed Potential Problems Will be Absent or Manageable (Diabetes, Type 2)  Outcome: Ongoing (interventions implemented as appropriate)      Problem: Pressure Ulcer (Adult)  Goal: Signs and Symptoms of Listed Potential Problems Will be Absent or Manageable (Pressure Ulcer)  Outcome: Ongoing (interventions implemented as appropriate)

## 2018-02-08 NOTE — PLAN OF CARE
Problem: Patient Care Overview (Adult)  Goal: Plan of Care Review  Outcome: Ongoing (interventions implemented as appropriate)   02/07/18 2055   Coping/Psychosocial Response Interventions   Plan Of Care Reviewed With patient   Patient Care Overview   Progress improving   Outcome Evaluation   Outcome Summary/Follow up Plan pt alert and oriented x4. complains of pain in lower extremeties often. norco given with good relief. pt trimmed and shaved beard partially. consent is signed for the tunneled cath scheduled for tomorrow. dressing dry and intact. pt on oxygen via nasal cannula. no signs or symptoms of distress. will continue to monitor.      Goal: Adult Individualization and Mutuality  Outcome: Ongoing (interventions implemented as appropriate)    Goal: Discharge Needs Assessment  Outcome: Ongoing (interventions implemented as appropriate)      Problem: Fall Risk (Adult)  Goal: Absence of Falls  Outcome: Ongoing (interventions implemented as appropriate)      Problem: Renal Failure/Kidney Injury, Acute (Adult)  Goal: Signs and Symptoms of Listed Potential Problems Will be Absent or Manageable (Renal Failure/Kidney Injury, Acute)  Outcome: Ongoing (interventions implemented as appropriate)      Problem: Diabetes, Type 2 (Adult)  Goal: Signs and Symptoms of Listed Potential Problems Will be Absent or Manageable (Diabetes, Type 2)  Outcome: Ongoing (interventions implemented as appropriate)      Problem: Pressure Ulcer (Adult)  Goal: Signs and Symptoms of Listed Potential Problems Will be Absent or Manageable (Pressure Ulcer)  Outcome: Ongoing (interventions implemented as appropriate)

## 2018-02-08 NOTE — PROGRESS NOTES
"  Daily Progress Note.   [facility] MAIN OR  2/8/2018    Patient:  Name:  Kevin Mckeon V  MRN:  [id number]  1950  67 y.o.  male         Interval History:  tolerated HD access well.  Still feels some soa but much improved.  No chest pain  No fevers  No hemoptysis  No productive cough.    Physical Exam:  /82 (BP Location: Right arm, Patient Position: Lying)  Pulse 65  Temp 97.7 °F (36.5 °C) (Oral)   Resp 18  Ht 182.9 cm (72.01\")  Wt 83.7 kg (184 lb 8.4 oz)  SpO2 94%  BMI 25.02 kg/m2  Body mass index is 25.02 kg/(m^2).    Intake/Output Summary (Last 24 hours) at 02/08/18 1005  Last data filed at 02/08/18 0529   Gross per 24 hour   Intake              720 ml   Output             2725 ml   Net            -2005 ml     GENERAL: well appearing alert  HEENT:  EOMI, nonicteric sclera, Unable to  JVD secondary to large unkept beard  PULMONARY:    mild diminished air entry at bases today no wheeze unlabored respiratory effort  CARDIAC:  RRR   ABD: SNTND BS+  EXT: BLE edema 1Pittingwith erythematous changes over bilateral shins and a non bleeding abrasion on left heel, pulses symmetric 2+ bilaterally  NEURO:  Awake alert cranial nerves intact in all extremities speech intact  SKIN:+lesions as above  PSYCH: calm appropriate       Data Review:  Notable Labs:    Results from last 7 days  Lab Units 02/08/18  0514 02/07/18  0558 02/06/18  2212 02/06/18  1402 02/06/18  0440   WBC 10*3/mm3 5.73  --   --   --  7.48   HEMOGLOBIN g/dL 8.0* 8.2* 8.2* 6.8* 6.9*   PLATELETS 10*3/mm3 239  --   --   --  266       Results from last 7 days  Lab Units 02/08/18  0514 02/07/18  0558 02/06/18  0440   SODIUM mmol/L 138 139 135*   POTASSIUM mmol/L 3.9 3.9 4.9   CHLORIDE mmol/L 102 102 100   CO2 mmol/L 27.5 24.3 22.6   BUN mg/dL 36* 29* 63*   CREATININE mg/dL 2.78* 2.40* 3.85*   GLUCOSE mg/dL 111* 98 258*   CALCIUM mg/dL 8.0* 8.0* 8.3*   MAGNESIUM mg/dL  --   --  2.1   PHOSPHORUS mg/dL 4.8* 3.8 6.7*   Estimated " Creatinine Clearance: 31.4 mL/min (by C-G formula based on Cr of 2.78).    Imaging:  No new chest imaging    Scheduled meds:      amLODIPine 10 mg Oral Q24H   arformoterol 15 mcg Nebulization BID - RT   aspirin 81 mg Oral Daily   budesonide 0.25 mg Nebulization BID - RT   buPROPion  mg Oral Q12H   cholecalciferol 1,000 Units Oral Daily   citalopram 20 mg Oral Daily   CloNIDine 0.2 mg Oral Q12H   epoetin chloe 20,000 Units Subcutaneous Weekly   gabapentin 300 mg Oral Q12H   heparin (porcine) 3,000 Units Intracatheter Once   hydrALAZINE 50 mg Oral Q8H   hydrocerin  Topical Daily   insulin aspart 0-9 Units Subcutaneous 4x Daily With Meals & Nightly   insulin aspart 7 Units Subcutaneous TID With Meals   insulin detemir 30 Units Subcutaneous Nightly   ipratropium-albuterol 3 mL Nebulization Q6H While Awake - RT   iron sucrose 200 mg Intravenous Q24H   metoprolol tartrate 50 mg Oral Q12H   pantoprazole 40 mg Oral BID AC   risperiDONE 2 mg Oral Nightly   sodium bicarbonate 650 mg Oral TID   tamsulosin 0.4 mg Oral Nightly   traZODone 100 mg Oral Nightly       ASSESSMENT  /  PLAN:  Acute bilateral pulmonary edema  Acute hypoxic resp failure  Bilateral pleural effusions  Anemia  Hyperglycemia  ESRD  DMII   JAMES On CPAP therapy  H/o ulcerative esophagitis, duodenal erosions     Will continue autobipap at night for james unless family can bring in his home cpap for james  Continue BD and efforts to improve fluid status as renal is doing.  Appears patient has new access and is heading to HD this afternoon.  Will request to add humidification to oxygen as pt complaining of dry mouth with bipap and nc.  Will continue to follow, suspect that with continued fluid removal we will be able to wean off oxygen.      Tony Mccauley MD  [facility]  02/08/18

## 2018-02-09 LAB
ALBUMIN SERPL-MCNC: 2.2 G/DL (ref 3.5–5.2)
ANION GAP SERPL CALCULATED.3IONS-SCNC: 7.8 MMOL/L
BUN BLD-MCNC: 18 MG/DL (ref 8–23)
BUN/CREAT SERPL: 9 (ref 7–25)
CALCIUM SPEC-SCNC: 8.2 MG/DL (ref 8.6–10.5)
CHLORIDE SERPL-SCNC: 102 MMOL/L (ref 98–107)
CO2 SERPL-SCNC: 29.2 MMOL/L (ref 22–29)
CREAT BLD-MCNC: 2.01 MG/DL (ref 0.76–1.27)
DEPRECATED RDW RBC AUTO: 50.2 FL (ref 37–54)
ERYTHROCYTE [DISTWIDTH] IN BLOOD BY AUTOMATED COUNT: 15.1 % (ref 11.5–14.5)
GFR SERPL CREATININE-BSD FRML MDRD: 34 ML/MIN/1.73
GLUCOSE BLD-MCNC: 88 MG/DL (ref 65–99)
GLUCOSE BLDC GLUCOMTR-MCNC: 167 MG/DL (ref 70–130)
GLUCOSE BLDC GLUCOMTR-MCNC: 194 MG/DL (ref 70–130)
GLUCOSE BLDC GLUCOMTR-MCNC: 242 MG/DL (ref 70–130)
GLUCOSE BLDC GLUCOMTR-MCNC: 75 MG/DL (ref 70–130)
HBV SURFACE AB SER RIA-ACNC: REACTIVE
HCT VFR BLD AUTO: 27.3 % (ref 40.4–52.2)
HGB BLD-MCNC: 8 G/DL (ref 13.7–17.6)
MCH RBC QN AUTO: 27.1 PG (ref 27–32.7)
MCHC RBC AUTO-ENTMCNC: 29.3 G/DL (ref 32.6–36.4)
MCV RBC AUTO: 92.5 FL (ref 79.8–96.2)
PHOSPHATE SERPL-MCNC: 3.3 MG/DL (ref 2.5–4.5)
PLATELET # BLD AUTO: 239 10*3/MM3 (ref 140–500)
PMV BLD AUTO: 10.4 FL (ref 6–12)
POTASSIUM BLD-SCNC: 3.8 MMOL/L (ref 3.5–5.2)
RBC # BLD AUTO: 2.95 10*6/MM3 (ref 4.6–6)
SODIUM BLD-SCNC: 139 MMOL/L (ref 136–145)
WBC NRBC COR # BLD: 5.01 10*3/MM3 (ref 4.5–10.7)

## 2018-02-09 PROCEDURE — 86706 HEP B SURFACE ANTIBODY: CPT | Performed by: INTERNAL MEDICINE

## 2018-02-09 PROCEDURE — 82962 GLUCOSE BLOOD TEST: CPT

## 2018-02-09 PROCEDURE — 94799 UNLISTED PULMONARY SVC/PX: CPT

## 2018-02-09 PROCEDURE — 25010000002 HEPARIN (PORCINE) PER 1000 UNITS: Performed by: INTERNAL MEDICINE

## 2018-02-09 PROCEDURE — 99231 SBSQ HOSP IP/OBS SF/LOW 25: CPT

## 2018-02-09 PROCEDURE — 97110 THERAPEUTIC EXERCISES: CPT

## 2018-02-09 PROCEDURE — 80069 RENAL FUNCTION PANEL: CPT | Performed by: INTERNAL MEDICINE

## 2018-02-09 PROCEDURE — 25010000002 IRON SUCROSE PER 1 MG: Performed by: INTERNAL MEDICINE

## 2018-02-09 PROCEDURE — 86704 HEP B CORE ANTIBODY TOTAL: CPT | Performed by: INTERNAL MEDICINE

## 2018-02-09 PROCEDURE — 87350 HEPATITIS BE AG IA: CPT | Performed by: INTERNAL MEDICINE

## 2018-02-09 PROCEDURE — 85027 COMPLETE CBC AUTOMATED: CPT | Performed by: INTERNAL MEDICINE

## 2018-02-09 PROCEDURE — 63710000001 INSULIN ASPART PER 5 UNITS: Performed by: HOSPITALIST

## 2018-02-09 RX ORDER — HEPARIN SODIUM 5000 [USP'U]/ML
5000 INJECTION, SOLUTION INTRAVENOUS; SUBCUTANEOUS EVERY 12 HOURS SCHEDULED
Status: DISCONTINUED | OUTPATIENT
Start: 2018-02-09 | End: 2018-02-13 | Stop reason: HOSPADM

## 2018-02-09 RX ORDER — HYDRALAZINE HYDROCHLORIDE 50 MG/1
100 TABLET, FILM COATED ORAL EVERY 8 HOURS SCHEDULED
Status: DISCONTINUED | OUTPATIENT
Start: 2018-02-09 | End: 2018-02-13 | Stop reason: HOSPADM

## 2018-02-09 RX ORDER — BUMETANIDE 2 MG/1
2 TABLET ORAL 2 TIMES DAILY
Status: DISCONTINUED | OUTPATIENT
Start: 2018-02-09 | End: 2018-02-12

## 2018-02-09 RX ADMIN — RISPERIDONE 2 MG: 1 TABLET ORAL at 21:06

## 2018-02-09 RX ADMIN — BUDESONIDE 0.25 MG: 0.25 INHALANT RESPIRATORY (INHALATION) at 08:56

## 2018-02-09 RX ADMIN — AMLODIPINE BESYLATE 10 MG: 10 TABLET ORAL at 12:35

## 2018-02-09 RX ADMIN — INSULIN ASPART 4 UNITS: 100 INJECTION, SOLUTION INTRAVENOUS; SUBCUTANEOUS at 12:36

## 2018-02-09 RX ADMIN — HYDRALAZINE HYDROCHLORIDE 50 MG: 50 TABLET, FILM COATED ORAL at 05:53

## 2018-02-09 RX ADMIN — IRON SUCROSE 200 MG: 20 INJECTION, SOLUTION INTRAVENOUS at 16:01

## 2018-02-09 RX ADMIN — METOPROLOL TARTRATE 50 MG: 50 TABLET, FILM COATED ORAL at 12:35

## 2018-02-09 RX ADMIN — BUMETANIDE 2 MG: 2 TABLET ORAL at 21:07

## 2018-02-09 RX ADMIN — BUPROPION HYDROCHLORIDE 200 MG: 100 TABLET, EXTENDED RELEASE ORAL at 12:35

## 2018-02-09 RX ADMIN — ASPIRIN 81 MG: 81 TABLET, CHEWABLE ORAL at 12:35

## 2018-02-09 RX ADMIN — CITALOPRAM 20 MG: 20 TABLET, FILM COATED ORAL at 12:35

## 2018-02-09 RX ADMIN — INSULIN ASPART 2 UNITS: 100 INJECTION, SOLUTION INTRAVENOUS; SUBCUTANEOUS at 18:27

## 2018-02-09 RX ADMIN — METOPROLOL TARTRATE 50 MG: 50 TABLET, FILM COATED ORAL at 21:06

## 2018-02-09 RX ADMIN — HYDRALAZINE HYDROCHLORIDE 100 MG: 50 TABLET, FILM COATED ORAL at 21:07

## 2018-02-09 RX ADMIN — VITAMIN D, TAB 1000IU (100/BT) 1000 UNITS: 25 TAB at 12:35

## 2018-02-09 RX ADMIN — GABAPENTIN 300 MG: 300 CAPSULE ORAL at 21:06

## 2018-02-09 RX ADMIN — PANTOPRAZOLE SODIUM 40 MG: 40 TABLET, DELAYED RELEASE ORAL at 12:35

## 2018-02-09 RX ADMIN — IPRATROPIUM BROMIDE AND ALBUTEROL SULFATE 3 ML: .5; 3 SOLUTION RESPIRATORY (INHALATION) at 07:03

## 2018-02-09 RX ADMIN — HYDROCODONE BITARTRATE AND ACETAMINOPHEN 1 TABLET: 5; 325 TABLET ORAL at 08:02

## 2018-02-09 RX ADMIN — HYDROCODONE BITARTRATE AND ACETAMINOPHEN 1 TABLET: 5; 325 TABLET ORAL at 16:25

## 2018-02-09 RX ADMIN — TRAZODONE HYDROCHLORIDE 100 MG: 100 TABLET, FILM COATED ORAL at 21:07

## 2018-02-09 RX ADMIN — CLONIDINE HYDROCHLORIDE 0.2 MG: 0.1 TABLET ORAL at 21:07

## 2018-02-09 RX ADMIN — HYDROCODONE BITARTRATE AND ACETAMINOPHEN 1 TABLET: 5; 325 TABLET ORAL at 03:40

## 2018-02-09 RX ADMIN — INSULIN ASPART 2 UNITS: 100 INJECTION, SOLUTION INTRAVENOUS; SUBCUTANEOUS at 22:12

## 2018-02-09 RX ADMIN — IPRATROPIUM BROMIDE AND ALBUTEROL SULFATE 3 ML: .5; 3 SOLUTION RESPIRATORY (INHALATION) at 15:01

## 2018-02-09 RX ADMIN — HYDROCODONE BITARTRATE AND ACETAMINOPHEN 1 TABLET: 5; 325 TABLET ORAL at 20:54

## 2018-02-09 RX ADMIN — PANTOPRAZOLE SODIUM 40 MG: 40 TABLET, DELAYED RELEASE ORAL at 18:29

## 2018-02-09 RX ADMIN — TAMSULOSIN HYDROCHLORIDE 0.4 MG: 0.4 CAPSULE ORAL at 21:07

## 2018-02-09 RX ADMIN — SODIUM BICARBONATE 650 MG: 650 TABLET ORAL at 16:01

## 2018-02-09 RX ADMIN — HYDROCODONE BITARTRATE AND ACETAMINOPHEN 1 TABLET: 5; 325 TABLET ORAL at 12:35

## 2018-02-09 RX ADMIN — SODIUM BICARBONATE 650 MG: 650 TABLET ORAL at 12:34

## 2018-02-09 RX ADMIN — HEPARIN SODIUM 5000 UNITS: 5000 INJECTION, SOLUTION INTRAVENOUS; SUBCUTANEOUS at 21:07

## 2018-02-09 RX ADMIN — CLONIDINE HYDROCHLORIDE 0.2 MG: 0.1 TABLET ORAL at 12:35

## 2018-02-09 RX ADMIN — SODIUM BICARBONATE 650 MG: 650 TABLET ORAL at 21:07

## 2018-02-09 RX ADMIN — BUPROPION HYDROCHLORIDE 200 MG: 100 TABLET, EXTENDED RELEASE ORAL at 21:07

## 2018-02-09 RX ADMIN — GABAPENTIN 300 MG: 300 CAPSULE ORAL at 12:35

## 2018-02-09 RX ADMIN — ARFORMOTEROL TARTRATE 15 MCG: 15 SOLUTION RESPIRATORY (INHALATION) at 08:56

## 2018-02-09 RX ADMIN — INSULIN DETEMIR 30 UNITS: 100 INJECTION, SOLUTION SUBCUTANEOUS at 22:13

## 2018-02-09 NOTE — PROGRESS NOTES
Continued Stay Note  [facility]     Patient Name: Kevin Mckeon V  MRN: [id number]  Today's Date: 2/9/2018    Admit Date: 2/5/2018          Discharge Plan       02/09/18 1450    Case Management/Social Work Plan    Plan [facility] with HD per [facility] M[facility]    Patient/Family In Agreement With Plan yes    Additional Comments Inbound call from Mine/Dante and patient is scheduled for MWF [phone]. His first day is scheduled for 2/14/18 and he should be there at 1145 to complete paperwork. Called to Gemma/Randi and she says they have talked with pt and family and he is going to [facility] instead of [facility]. Updated Bryanna on HD schedule. She is going to check status of precert/               Discharge Codes     None            Heath Chambers RN

## 2018-02-09 NOTE — PROGRESS NOTES
GI Daily Progress Note    Assessment/Plan:    Active Problems:    Acute renal failure (ARF)       LOS: 4 days     Kevin Mckeon V is a 67 y.o. male who was admitted with RenalFailure/Volume overload/Respiratory failyre. He reports his symptoms are improving with treatment. Rested well is hungry and tolerated Dialysis well. No dysphagia nd his hgb is 8.0 unchanged    Subjective:    Patient expresses No GI complaints  Patient denies abdominal pain and bloody stools    Objective:    Vital signs in last 24 hours:  Temp:  [95.1 °F (35.1 °C)-98.5 °F (36.9 °C)] 96.2 °F (35.7 °C)  Heart Rate:  [55-69] 63  Resp:  [16-18] 16  BP: (119-182)/(71-93) 182/92    Intake/Output last 3 shifts:  I/O last 3 completed shifts:  In: 820 [P.O.:720; I.V.:100]  Out: 3825 [Urine:3825]  Intake/Output this shift:  I/O this shift:  In: 420 [P.O.:420]  Out: 7000 [Urine:3000; Other:4000]    Physical Exam:Abdomen  Sounds Normal Active Bowel Sounds   Distension Soft   Tenderness Nontender     Anemia  ESRD  Ulcerative esophagitis  DM\  Bilat LE Ulcers  History of Paranoid Shizophrenia  Volume Overload/Respiratory failure    Overall doing well on present regimen, will see as an OP in 4 weeks, BGA available this wknd if needed

## 2018-02-09 NOTE — PROGRESS NOTES
"  Name: Kevin Mckeon V  ADMIT: 2018   Age/Sex: 67 y.o.male LOS:  LOS: 4 days    :    1950     ROOM: [facility]   MRN:    [id number]    PCP:    Alicia Harley MD     Subjective   Doing well. Breathing ok. States he is a little shaky with his BS that low    Objective   Vital Signs  Temp:  [95.1 °F (35.1 °C)-98.5 °F (36.9 °C)] 96.2 °F (35.7 °C)  Heart Rate:  [55-69] 65  Resp:  [16-18] 18  BP: (119-182)/(71-93) 182/92  Body mass index is 25.02 kg/(m^2).    Objective:  General Appearance:  Comfortable and well-appearing.    Vital signs: (most recent): Blood pressure 156/86, pulse 70, temperature 98.9 °F (37.2 °C), temperature source Oral, resp. rate 18, height 182.9 cm (72.01\"), weight 83.7 kg (184 lb 8.4 oz), SpO2 91 %.  Vital signs are normal.    HEENT: Normal HEENT exam.    Lungs:  Normal effort.  (Crackles at bases)  Heart: Normal rate.  Regular rhythm.    Abdomen: Abdomen is soft and non-distended.  Bowel sounds are normal.   There is no abdominal tenderness.     Extremities: There is dependent edema.  There is no deformity.    Neurological: Patient is alert and oriented to person, place and time.    Skin:  Warm and dry.  No rash.             Results Review:       I reviewed the patient's new clinical results.    Results from last 7 days  Lab Units 18  0509 18  0514 18  0558 18  2212 18  1402 18  0440   WBC 10*3/mm3 5.01 5.73  --   --   --  7.48   HEMOGLOBIN g/dL 8.0* 8.0* 8.2* 8.2* 6.8* 6.9*   PLATELETS 10*3/mm3 239 239  --   --   --  266     Results from last 7 days  Lab Units 18  0509 18  0514 18  0558 02/06/18  0440   SODIUM mmol/L 139 138 139 135*   POTASSIUM mmol/L 3.8 3.9 3.9 4.9   CHLORIDE mmol/L 102 102 102 100   CO2 mmol/L 29.2* 27.5 24.3 22.6   BUN mg/dL 18 36* 29* 63*   CREATININE mg/dL 2.01* 2.78* 2.40* 3.85*   GLUCOSE mg/dL 88 111* 98 258*   Estimated Creatinine Clearance: 43.4 mL/min (by C-G formula based on Cr of 2.01).  Results " from last 7 days  Lab Units 02/09/18  0509 02/08/18  0514 02/07/18  0558 02/06/18  0440   CALCIUM mg/dL 8.2* 8.0* 8.0* 8.3*   ALBUMIN g/dL 2.20* 2.40* 2.20*  --    MAGNESIUM mg/dL  --   --   --  2.1   PHOSPHORUS mg/dL 3.3 4.8* 3.8 6.7*       RADIOLOGY  2/9/2018  Pending      amLODIPine 10 mg Oral Q24H   arformoterol 15 mcg Nebulization BID - RT   aspirin 81 mg Oral Daily   budesonide 0.25 mg Nebulization BID - RT   buPROPion  mg Oral Q12H   ceFAZolin 2 g Intravenous Once   cholecalciferol 1,000 Units Oral Daily   citalopram 20 mg Oral Daily   CloNIDine 0.2 mg Oral Q12H   epoetin chloe 20,000 Units Subcutaneous Weekly   gabapentin 300 mg Oral Q12H   heparin (porcine) 3,000 Units Intracatheter Once   hydrALAZINE 50 mg Oral Q8H   hydrocerin  Topical Daily   insulin aspart 0-9 Units Subcutaneous 4x Daily With Meals & Nightly   insulin aspart 7 Units Subcutaneous TID With Meals   insulin detemir 30 Units Subcutaneous Nightly   ipratropium-albuterol 3 mL Nebulization Q6H While Awake - RT   iron sucrose 200 mg Intravenous Q24H   metoprolol tartrate 50 mg Oral Q12H   pantoprazole 40 mg Oral BID AC   risperiDONE 2 mg Oral Nightly   sodium bicarbonate 650 mg Oral TID   tamsulosin 0.4 mg Oral Nightly   traZODone 100 mg Oral Nightly      Diet Regular; Consistent Carbohydrate      Assessment/Plan   Assessment:   Active Problems:    Acute renal failure (ARF)        Plan:   1. Iron deficiency anemia  - Hb better after 1u pRBC on 2/6  - IV iron per renal  - to follow up with GI in 4 weeks in clinic      2. CKD4 with progression to ESRD  - on HD now and much improved  - tunneled HD cathter placed 2/8  - renal managing      3. DM2  - BS better now.   - levemir at 30u. He is not requiring any of the scheduled meal time insulin so will d/c this.   - will monitor BS today but may need to back off levemir if they remain this low.   - CC diet      4. Acute hypercapneic and hypoxic resp failure  - due to underlying MING/OHV and fluid  overload  - better after BiPAP and HD  - Pulm following      5. HTN  - home meds re-ordered, defer to renal for any changes      6. LE wounds  - ABIs normal  - ID has seen and no signs of active infection currently. Cont local wound care        Disposition  Whenever ok with renal      He Strange MD  [facility]  02/09/18  8:12 AM

## 2018-02-09 NOTE — PROGRESS NOTES
Subjective except chronic foot pain has no c/o denies cp/sob   Chief complaint:esrd         Objective:nad      Vital Signs  Temp:  [95.1 °F (35.1 °C)-98.9 °F (37.2 °C)] 98.9 °F (37.2 °C)  Heart Rate:  [55-70] 70  Resp:  [16-18] 18  BP: (135-182)/(71-93) 165/89        Intake/Output Summary (Last 24 hours) at 02/09/18 1415  Last data filed at 02/09/18 1225   Gross per 24 hour   Intake              900 ml   Output             7750 ml   Net            -6850 ml           Physical Exam    Constitutional : well developed ,No acute distress  ENMT lips pink oral mucosa moist   Eyes sclerae white PERRL  Respiratory: Clear to auscultation , No crackles no wheezing respirations are even and un-labored  GI: Soft, Non tender, BS active in all 4 quadrants  CVS: S1 S2 regular, no rub murmur or gallops  Skin warm dry no rashes no petechiae multiple wounds heel right below knee right   Neuro grossly nonfocal cranial nerves 2-12 grossly intact sensation intact  Ext: b/l LE edema, Peripheral pulses intact   Heme no cervical  lymphadenopathy no petechiae  RIJ TDC no signs of inflammation         Results Review:      Lab Results   Component Value Date    GLUCOSE 88 02/09/2018    CALCIUM 8.2 (L) 02/09/2018     02/09/2018    K 3.8 02/09/2018    CO2 29.2 (H) 02/09/2018     02/09/2018    BUN 18 02/09/2018    CREATININE 2.01 (H) 02/09/2018    EGFRIFNONA 34 (L) 02/09/2018    BCR 9.0 02/09/2018    ANIONGAP 7.8 02/09/2018       Lab Results   Component Value Date    WBC 5.01 02/09/2018    HGB 8.0 (L) 02/09/2018    HCT 27.3 (L) 02/09/2018    MCV 92.5 02/09/2018     02/09/2018       Pertinent Imaging studies were reviewed      Medication Review:       Current Facility-Administered Medications:   •  acetaminophen (TYLENOL) tablet 650 mg, 650 mg, Oral, Q6H PRN, Hank Joaquin MD  •  amLODIPine (NORVASC) tablet 10 mg, 10 mg, Oral, Q24H, Hank Joaquin MD, 10 mg at 02/09/18 1235  •  arformoterol (BROVANA) nebulizer  solution 15 mcg, 15 mcg, Nebulization, BID - RT, Tony Mccauley MD, 15 mcg at 02/09/18 0856  •  aspirin chewable tablet 81 mg, 81 mg, Oral, Daily, Hank Joaquin MD, 81 mg at 02/09/18 1235  •  budesonide (PULMICORT) nebulizer solution 0.25 mg, 0.25 mg, Nebulization, BID - RT, Tony Mccauley MD, 0.25 mg at 02/09/18 0856  •  buPROPion SR (WELLBUTRIN SR) 12 hr tablet 200 mg, 200 mg, Oral, Q12H, Hank Joaquin MD, 200 mg at 02/09/18 1235  •  ceFAZolin in dextrose (ANCEF) IVPB solution 2 g, 2 g, Intravenous, Once, Nitin Coronel MD  •  cholecalciferol (VITAMIN D3) tablet 1,000 Units, 1,000 Units, Oral, Daily, Hank Joaquin MD, 1,000 Units at 02/09/18 1235  •  citalopram (CeleXA) tablet 20 mg, 20 mg, Oral, Daily, Hank Joaquin MD, 20 mg at 02/09/18 1235  •  CloNIDine (CATAPRES) tablet 0.2 mg, 0.2 mg, Oral, Q12H, Hank Joaquin MD, 0.2 mg at 02/09/18 1235  •  dextrose (D50W) solution 25 g, 25 g, Intravenous, Q15 Min PRN, Danilo eDl Valle MD  •  dextrose (GLUTOSE) oral gel 15 g, 15 g, Oral, Q15 Min PRN, Danilo Del Valle MD  •  epoetin chloe (EPOGEN,PROCRIT) injection 20,000 Units, 20,000 Units, Subcutaneous, Weekly, Hank Joaquin MD, 20,000 Units at 02/06/18 1844  •  gabapentin (NEURONTIN) capsule 300 mg, 300 mg, Oral, Q12H, Hank Joaquin MD, 300 mg at 02/09/18 1235  •  glucagon (human recombinant) (GLUCAGEN DIAGNOSTIC) injection 1 mg, 1 mg, Subcutaneous, PRN, Danilo Del Valle MD  •  heparin (porcine) injection 3,000 Units, 3,000 Units, Intracatheter, Once, Tutu Smith MD  •  hydrALAZINE (APRESOLINE) tablet 50 mg, 50 mg, Oral, Q8H, Hank Joaquin MD, 50 mg at 02/09/18 0575  •  hydrocerin (EUCERIN) cream, , Topical, Daily, He Strange MD  •  HYDROcodone-acetaminophen (NORCO) 5-325 MG per tablet 1 tablet, 1 tablet, Oral, Q4H PRN, Hank Joaquin MD, 1 tablet at 02/09/18 1235  •  insulin aspart (novoLOG) injection 0-9 Units, 0-9  Units, Subcutaneous, 4x Daily With Meals & Nightly, Danilo Del Valle MD, 4 Units at 02/09/18 1236  •  insulin detemir (LEVEMIR) injection 30 Units, 30 Units, Subcutaneous, Nightly, Alan Arnett MD, 30 Units at 02/08/18 2208  •  ipratropium-albuterol (DUO-NEB) nebulizer solution 3 mL, 3 mL, Nebulization, Q6H While Awake - RT, He Strange MD, 3 mL at 02/09/18 0703  •  iron sucrose (VENOFER) 200 mg in sodium chloride 0.9 % 100 mL IVPB, 200 mg, Intravenous, Q24H, Hank Joaquin MD, Last Rate: 100 mL/hr at 02/07/18 1618, 200 mg at 02/07/18 1618  •  metoprolol tartrate (LOPRESSOR) tablet 50 mg, 50 mg, Oral, Q12H, Hank Joaquin MD, 50 mg at 02/09/18 1235  •  pantoprazole (PROTONIX) EC tablet 40 mg, 40 mg, Oral, BID AC, Danilo Hensley MD, 40 mg at 02/09/18 1235  •  risperiDONE (risperDAL) tablet 2 mg, 2 mg, Oral, Nightly, Hank Joaquin MD, 2 mg at 02/08/18 2208  •  sodium bicarbonate tablet 650 mg, 650 mg, Oral, TID, Hank Joaquin MD, 650 mg at 02/09/18 1234  •  tamsulosin (FLOMAX) 24 hr capsule 0.4 mg, 0.4 mg, Oral, Nightly, Hank Joaquin MD, 0.4 mg at 02/08/18 2207  •  traMADol (ULTRAM) tablet 50 mg, 50 mg, Oral, Q6H PRN, Gage Saavedra Jr., MD  •  traZODone (DESYREL) tablet 100 mg, 100 mg, Oral, Nightly, Hank Joaquin MD, 100 mg at 02/08/18 2207       Assesment  End-stage renal disease, initiated on hemodialysis  Anemia of chronic disease with iron deficiency  Lower Extremity edema, better with dialysis/ultrafiltration  Diabetes mellitus  Multiple skin ulcers, no antibiotics or vascular intervention needed per consultants  Decubitus ulcers  Deconditioning, physical therapy following  COPD  Hypertension  Tobacco abuse  Peripheral vascular disease  Erosive esophagitis          Plan:  Appreciate all consultants input  Increase hydralazine  Add ARB tomorrow if still high bp  Continue iv iron  Restart diuretic still edema  Continue TTS  HD  Awaiting precert for rehab  Awaiting chair in [facility] HD unit  Likely d/c on Monday  D/w RN and  and patient        Best Fletcher MD  02/09/18  2:15 PM  Tel: [phone]  Fax: [phone]

## 2018-02-09 NOTE — PLAN OF CARE
Problem: Patient Care Overview (Adult)  Goal: Plan of Care Review  Outcome: Ongoing (interventions implemented as appropriate)   02/09/18 1020   Coping/Psychosocial Response Interventions   Plan Of Care Reviewed With patient   Outcome Evaluation   Outcome Summary/Follow up Plan pt demonstrates some increased activity tolerance as he was able to increase his gait distance. Pt did required some increased assistance today secondary shuffling and unsteady gait. PT will continue to follow to address strength, balance, and gait.

## 2018-02-09 NOTE — PROGRESS NOTES
"Adult Nutrition  Assessment/PES    Patient Name:  Kevin Mckeon V  YOB: 1950  MRN: [id number]  Admit Date:  2/5/2018    Assessment Date:  2/9/2018    Comments:  Followed up with patient.  He reports great appetite, eating 100% of his meals.  Agrees to Nepro BID and addition of protein rich snacks in order to promote wound healing.    Will continue to follow.          Reason for Assessment       02/09/18 1356    Reason for Assessment    Reason For Assessment/Visit follow up protocol              Nutrition/Diet History       02/09/18 1356    Nutrition/Diet History    Typical Food/Fluid Intake patient reports great appetite, wants more food, obtain prefs            Anthropometrics       02/09/18 1357    Anthropometrics    Height 182.9 cm (72.01\")    Weight 83.7 kg (184 lb 8.4 oz)    RD Documented Current Weight  83.7 kg (184 lb 8.4 oz)    Ideal Body Weight (IBW)    Ideal Body Weight (IBW), Male (kg) 82.09    % Ideal Body Weight 102.17    Body Mass Index (BMI)    BMI (kg/m2) 25.07    BMI Grade 25 - 29.9 - overweight            Labs/Tests/Procedures/Meds       02/09/18 1357    Labs/Tests/Procedures/Meds    Diagnostic Test/Procedure Review reviewed, pertinent    Labs/Tests Review Reviewed;Glucose;CO2;Creat;GFR;Alb;Hgb Hct    Procedure Review Other (comment)   s/p TDC placement yesterday    Medication Review Reviewed, pertinent;Insulin;Iron;Antacid   vit D3, epo    Significant Vitals reviewed, pertinent            Physical Findings       02/09/18 1358    Physical Findings/Assessment    Additional Documentation Physical Appearance (Group)    Physical Appearance    Overall Physical Appearance overweight    Skin pressure ulcer(s);surgical wound;other (see comments)   neck inc, L leg venous ulcer, R leg venous ulcer, st II coccyx, L heel SDTI, B=18              Nutrition Prescription Ordered       02/09/18 1359    Nutrition Prescription PO    Current PO Diet Regular    Common Modifiers Consistent Carbohydrate "            Evaluation of Received Nutrient/Fluid Intake       02/09/18 1359    PO Evaluation    Number of Meals 2    % PO Intake 100            Problem/Interventions:                  Intervention Goal       02/09/18 1400    Intervention Goal    General Maintain nutrition;Disease management/therapy;Meet nutritional needs for age/condition    PO Maintain intake    Weight No significant weight loss            Nutrition Intervention       02/09/18 1400    Nutrition Intervention    RD/Tech Action Follow Tx progress;Care plan reviewd;Recommend/ordered;Interview for preference    Recommended/Ordered Supplement            Nutrition Prescription       02/09/18 1400    Nutrition Prescription PO    PO Prescription Begin/change supplement    Supplement Nepro    Supplement Frequency 2 times a day    New PO Prescription Ordered? Yes            Education/Evaluation       02/09/18 1400    Education    Education Will Instruct as appropriate    Monitor/Evaluation    Monitor Per protocol;Supplement intake;Pertinent labs;PO intake;Weight;Skin status        Electronically signed by:  Marichuy Howell RD  02/09/18 2:00 PM

## 2018-02-09 NOTE — PROGRESS NOTES
LOS: 4 days   Patient Care Team:  Alicia Harley MD as PCP - General (Family Medicine)    Chief Complaint: Acute renal failure    Subjective     67 y.o. male with acute renal failure and lower extremity ulcers and cellulitis.  His vein mapping shows only 1 reasonable vein which is his right basilic.  We will try to preserve this.  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  His legs have improved significantly.  His arterial testing  is within normal limits.  Tolerating dialysis through palindrome..  Long-term placement of right basilic vein transposition AV fistula will be determined by Dr. Joaquin    Review of Systems  Review of Systems - Negative except mild shortness of air      Objective     Vital Signs  Temp:  [95.1 °F (35.1 °C)-98.5 °F (36.9 °C)] 96.2 °F (35.7 °C)  Heart Rate:  [55-69] 65  Resp:  [16-18] 18  BP: (119-182)/(71-93) 182/92    Physical Exam  General: No acute distress. Alert and oriented x 4  HEENT: No jugular venous distension, trachea is midline  CV: RRR, S1S2  Resp: Course mildly labored breathing on both sides  Abd: Abdomen is soft, nontender, nondistended  Extremities: Viable with multiple ulcers cellulitis and thin skin on both lower extremities.  Prior amputation sites of the toes noted.  Nonpalpable pulses.    Results Review:       Recent Results (from the past 24 hour(s))   POC Glucose Once    Collection Time: 02/08/18 12:27 PM   Result Value Ref Range    Glucose 80 70 - 130 mg/dL   POC Glucose Once    Collection Time: 02/08/18  4:04 PM   Result Value Ref Range    Glucose 123 70 - 130 mg/dL   POC Glucose Once    Collection Time: 02/08/18  9:22 PM   Result Value Ref Range    Glucose 135 (H) 70 - 130 mg/dL   Renal Function Panel    Collection Time: 02/09/18  5:09 AM   Result Value Ref Range    Glucose 88 65 - 99 mg/dL    BUN 18 8 - 23 mg/dL    Creatinine 2.01 (H) 0.76 - 1.27 mg/dL     Sodium 139 136 - 145 mmol/L    Potassium 3.8 3.5 - 5.2 mmol/L    Chloride 102 98 - 107 mmol/L    CO2 29.2 (H) 22.0 - 29.0 mmol/L    Calcium 8.2 (L) 8.6 - 10.5 mg/dL    Albumin 2.20 (L) 3.50 - 5.20 g/dL    Phosphorus 3.3 2.5 - 4.5 mg/dL    Anion Gap 7.8 mmol/L    BUN/Creatinine Ratio 9.0 7.0 - 25.0    eGFR Non African Amer 34 (L) >60 mL/min/1.73   CBC (No Diff)    Collection Time: 02/09/18  5:09 AM   Result Value Ref Range    WBC 5.01 4.50 - 10.70 10*3/mm3    RBC 2.95 (L) 4.60 - 6.00 10*6/mm3    Hemoglobin 8.0 (L) 13.7 - 17.6 g/dL    Hematocrit 27.3 (L) 40.4 - 52.2 %    MCV 92.5 79.8 - 96.2 fL    MCH 27.1 27.0 - 32.7 pg    MCHC 29.3 (L) 32.6 - 36.4 g/dL    RDW 15.1 (H) 11.5 - 14.5 %    RDW-SD 50.2 37.0 - 54.0 fl    MPV 10.4 6.0 - 12.0 fL    Platelets 239 140 - 500 10*3/mm3   POC Glucose Once    Collection Time: 02/09/18  7:19 AM   Result Value Ref Range    Glucose 75 70 - 130 mg/dL   ]      Assessment/Plan           Active Problems:    Acute renal failure (ARF)      Assessment & Plan  67 y.o. male with acute renal failure and likely peripheral artery disease as well as lower extremity cellulitis.  Tolerated palindrome.  .  He will need eventual right basilic vein transposition of Dr. Joaquin agrees.  There is no arterial occlusive disease to be further evaluated and his legs are improving with antibiotics.  We have very little to add in that area.  We'll follow with you.  We will follow him up in the office to discuss basilic vein transposition if permanent dialysis is planned.    Nitin Coronel MD  02/09/18  7:34 AM

## 2018-02-09 NOTE — THERAPY TREATMENT NOTE
Acute Care - Physical Therapy Treatment Note  [facility]     Patient Name: Kevin Mckeon V  : 1950  MRN: [id number]  Today's Date: 2018  Onset of Illness/Injury or Date of Surgery Date: 18  Date of Referral to PT: 18  Referring Physician: Mikey    Admit Date: 2018    Visit Dx:  No diagnosis found.  Patient Active Problem List   Diagnosis   • DKA (diabetic ketoacidoses)   • Early satiety   • ARF (acute renal failure)   • CKD (chronic kidney disease) stage 4, GFR 15-29 ml/min   • Nephrotic range proteinuria   • DM (diabetes mellitus) type II controlled with renal manifestation   • Anemia of chronic renal failure   • Ulcer of right knee   • Acute renal failure (ARF)               Adult Rehabilitation Note       18 [facility]          Rehab Assessment/Intervention    Discipline physical therapist  -      Document Type therapy note (daily note)  -      Subjective Information agree to therapy  -      Patient Effort, Rehab Treatment good  -      Symptoms Noted During/After Treatment none  -      Precautions/Limitations fall precautions  -CH      Recorded by [CH] Taylor Sarabia, PT      Pain Assessment    Pain Assessment 0-10  -      Pain Score 4  -      Pain Location Foot  -      Pain Orientation Left  -      Pain Intervention(s) Repositioned  -      Recorded by [CH] Taylor Sarabia, PT      Cognitive Assessment/Intervention    Current Cognitive/Communication Assessment functional  -      Orientation Status oriented x 4  -      Follows Commands/Answers Questions 100% of the time  -      Personal Safety WNL/WFL  -      Personal Safety Interventions fall prevention program maintained;gait belt;nonskid shoes/slippers when out of bed  -      Recorded by [CH] Taylor Sarabia, PT      Bed Mobility, Assessment/Treatment    Bed Mob, Supine to Sit, [place] verbal cues required;nonverbal cues required (demo/gesture);contact guard assist  -      Bed Mob, Sit  to Supine, [place] verbal cues required;nonverbal cues required (demo/gesture);contact guard assist  -CH      Recorded by [SE] Taylor Sarabia PT      Transfer Assessment/Treatment    Transfers, Sit-Stand [place] verbal cues required;nonverbal cues required (demo/gesture);minimum assist (75% patient effort);2 person assist required  -CH      Transfers, Stand-Sit [place] verbal cues required;nonverbal cues required (demo/gesture);minimum assist (75% patient effort);2 person assist required  -      Transfers, Sit-Stand-Sit, Assist Device rolling walker  -CH      Recorded by [] Taylor Sarabia PT      Gait Assessment/Treatment    Gait, [place] Level verbal cues required;nonverbal cues required (demo/gesture);minimum assist (75% patient effort)  -      Gait, Assistive Device rolling walker  -      Gait, Distance (Feet) 40  -      Gait, Gait Deviations shayan decreased;forward flexed posture;step length decreased;stride length decreased  -CH      Recorded by [SE] Taylor Sarabia PT      Motor Skills/Interventions    Additional Documentation --  -CH      Recorded by [] Taylor Sarabia PT      Positioning and Restraints    Pre-Treatment Position in bed  -      Post Treatment Position bed  -      In Bed supine;call light within reach;encouraged to call for assist;with family/caregiver;exit alarm on  -      Recorded by [] Taylor Sarabia PT        User Key  (r) = Recorded By, (t) = Taken By, (c) = Cosigned By    Initials Name Effective Dates     Taylor Sarabia PT 12/01/15 -                 IP PT Goals       02/07/18 0834          Bed Mobility PT LTG    Bed Mobility PT LTG, Date Established 02/07/18  -LC      Bed Mobility PT LTG, Time to Achieve 1 wk  -LC      Bed Mobility PT LTG, Activity Type all bed mobility  -LC      Bed Mobility PT LTG, [place] Level supervision required  -      Bed Mobility PT Goal  LTG, Assist Device bed rails  -LC      Transfer  Training PT LTG    Transfer Training PT LTG, Date Established 02/07/18  -LC      Transfer Training PT LTG, Time to Achieve 1 wk  -LC      Transfer Training PT LTG, Activity Type all transfers  -LC      Transfer Training PT LTG, [place] Level supervision required  -LC      Transfer Training PT LTG, Assist Device walker, rolling  -LC      Gait Training PT LTG    Gait Training Goal PT LTG, Date Established 02/07/18  -LC      Gait Training Goal PT LTG, Time to Achieve 1 wk  -LC      Gait Training Goal PT LTG, [place] Level supervision required  -LC      Gait Training Goal PT LTG, Assist Device walker, rolling  -LC      Gait Training Goal PT LTG, Distance to Achieve 200  -LC        User Key  (r) = Recorded By, (t) = Taken By, (c) = Cosigned By    Initials Name Provider Type    BRENDAN Odom, PT DPT Physical Therapist          Physical Therapy Education     Title: PT OT SLP Therapies (Done)     Topic: Physical Therapy (Done)     Point: Mobility training (Done)    Learning Progress Summary    Learner Readiness Method Response Comment Documented by Status   Patient Acceptance E,TB,D VU,NR   02/09/18 1019 Done    Acceptance E,TB VU   02/07/18 2054 Done    Acceptance E,D VU,DU safety during transfers and gait, benefits of activity  02/07/18 0834 Done               Point: Home exercise program (Done)    Learning Progress Summary    Learner Readiness Method Response Comment Documented by Status   Patient Acceptance E,TB VU   02/07/18 2054 Done               Point: Body mechanics (Done)    Learning Progress Summary    Learner Readiness Method Response Comment Documented by Status   Patient Acceptance E,TB,D VU,NR   02/09/18 1019 Done               Point: Precautions (Done)    Learning Progress Summary    Learner Readiness Method Response Comment Documented by Status   Patient Acceptance E,TB,D VU,NR   02/09/18 1019 Done                      User Key     Initials Effective Dates Name Provider Type  Discipline     12/01/15 -  Taylor Sarabia, PT Physical Therapist PT    SB 06/16/16 -  Leah Brice, RN Registered Nurse Nurse     08/02/16 -  Abhay Odom, PT DPT Physical Therapist PT                    PT Recommendation and Plan  Anticipated Discharge Disposition: skilled nursing facility  PT Frequency: daily  Plan of Care Review  Plan Of Care Reviewed With: patient  Outcome Summary/Follow up Plan: pt demonstrates some increased activity tolerance as he was able to increase his gait distance. Pt did required some increased assistance today secondary shuffling and unsteady gait. PT will continue to follow to address strength, balance, and gait.          Outcome Measures       02/09/18 1000 02/07/18 0800       How much help from another person do you currently need...    Turning from your back to your side while in flat bed without using bedrails? 3  - 3  -LC     Moving from lying on back to sitting on the side of a flat bed without bedrails? 3  - 3  -LC     Moving to and from a bed to a chair (including a wheelchair)? 3  - 3  -LC     Standing up from a chair using your arms (e.g., wheelchair, bedside chair)? 3  - 3  -LC     Climbing 3-5 steps with a railing? 2  - 2  -LC     To walk in hospital room? 3  - 3  -LC     AM-PAC 6 Clicks Score 17  - 17  -     Functional Assessment    Outcome Measure Options AM-PAC 6 Clicks Basic Mobility (PT)  - AM-PAC 6 Clicks Basic Mobility (PT)  -       User Key  (r) = Recorded By, (t) = Taken By, (c) = Cosigned By    Initials Name Provider Type     Taylor Sarabia, PT Physical Therapist    BRENDAN Odom, PT DPT Physical Therapist           Time Calculation:         PT Charges       02/09/18 1023          Time Calculation    Start Time 0951  -      Stop Time 1002  -      Time Calculation (min) 11 min  -      PT Received On 02/09/18  -      PT - Next Appointment 02/10/18  -        User Key  (r) = Recorded By, (t) = Taken By, (c) = Cosigned  By    Initials Name Provider Type    CH Taylor Sarabia, PT Physical Therapist          Therapy Charges for Today     Code Description Service Date Service Provider Modifiers Qty    87096118065 HC PT THER PROC EA 15 MIN 2/9/2018 Taylor Sarabia, PT GP 1    78745796144 HC PT THER SUPP EA 15 MIN 2/9/2018 Taylor Sarabia, PT GP 1          PT G-Codes  Outcome Measure Options: AM-PAC 6 Clicks Basic Mobility (PT)    Taylor Sarabia PT  2/9/2018

## 2018-02-09 NOTE — PLAN OF CARE
Problem: Patient Care Overview (Adult)  Goal: Plan of Care Review  Outcome: Ongoing (interventions implemented as appropriate)   02/09/18 0258   Coping/Psychosocial Response Interventions   Plan Of Care Reviewed With patient   Patient Care Overview   Progress progress toward functional goals as expected   Outcome Evaluation   Outcome Summary/Follow up Plan client admitted with ARF and is POD 0 pallindrome insertion to R chest. F/C in place and patent, all wound dsg's c,d,i; pain well controlled with prn lortab. assist with ADL's as needed. VS stable. discussed DM management r/t hx DM II.      Goal: Adult Individualization and Mutuality  Outcome: Ongoing (interventions implemented as appropriate)    Goal: Discharge Needs Assessment  Outcome: Ongoing (interventions implemented as appropriate)      Problem: Fall Risk (Adult)  Goal: Absence of Falls  Outcome: Ongoing (interventions implemented as appropriate)      Problem: Renal Failure/Kidney Injury, Acute (Adult)  Goal: Signs and Symptoms of Listed Potential Problems Will be Absent or Manageable (Renal Failure/Kidney Injury, Acute)  Outcome: Ongoing (interventions implemented as appropriate)      Problem: Diabetes, Type 2 (Adult)  Goal: Signs and Symptoms of Listed Potential Problems Will be Absent or Manageable (Diabetes, Type 2)  Outcome: Ongoing (interventions implemented as appropriate)      Problem: Pressure Ulcer (Adult)  Goal: Signs and Symptoms of Listed Potential Problems Will be Absent or Manageable (Pressure Ulcer)  Outcome: Ongoing (interventions implemented as appropriate)      Problem: Perioperative Period (Adult)  Goal: Signs and Symptoms of Listed Potential Problems Will be Absent or Manageable (Perioperative Period)  Outcome: Ongoing (interventions implemented as appropriate)

## 2018-02-09 NOTE — PROGRESS NOTES
Infectious Diseases Progress Note    Bettie Gutierrez MD     [facility]  [place]: 3 days  Patient Identification:  Name: Kevin Mckeon V  Age: 67 y.o.  Sex: male  :  1950  MRN: [id number]         Primary Care Physician: Alicia Harley MD            Subjective: Feeling better had surgery earlier.    Interval History: Underwent right IJ tunneled catheter placement.    Objective:    Scheduled Meds:    amLODIPine 10 mg Oral Q24H   arformoterol 15 mcg Nebulization BID - RT   aspirin 81 mg Oral Daily   budesonide 0.25 mg Nebulization BID - RT   buPROPion  mg Oral Q12H   ceFAZolin 2 g Intravenous Once   cholecalciferol 1,000 Units Oral Daily   citalopram 20 mg Oral Daily   CloNIDine 0.2 mg Oral Q12H   epoetin chloe 20,000 Units Subcutaneous Weekly   gabapentin 300 mg Oral Q12H   heparin (porcine) 3,000 Units Intracatheter Once   hydrALAZINE 50 mg Oral Q8H   hydrocerin  Topical Daily   insulin aspart 0-9 Units Subcutaneous 4x Daily With Meals & Nightly   insulin aspart 7 Units Subcutaneous TID With Meals   insulin detemir 30 Units Subcutaneous Nightly   ipratropium-albuterol 3 mL Nebulization Q6H While Awake - RT   iron sucrose 200 mg Intravenous Q24H   metoprolol tartrate 50 mg Oral Q12H   pantoprazole 40 mg Oral BID AC   risperiDONE 2 mg Oral Nightly   sodium bicarbonate 650 mg Oral TID   tamsulosin 0.4 mg Oral Nightly   traZODone 100 mg Oral Nightly     Continuous Infusions:     Vital signs in last 24 hours:  Temp:  [95.1 °F (35.1 °C)-98 °F (36.7 °C)] 97.6 °F (36.4 °C)  Heart Rate:  [55-69] 55  Resp:  [16-18] 16  BP: (119-151)/(71-85) 141/71    Intake/Output:    Intake/Output Summary (Last 24 hours) at 18  Last data filed at 18 1059   Gross per 24 hour   Intake              100 ml   Output             1275 ml   Net            -1175 ml       Exam:  /71 (BP Location: Left arm, Patient Position: Lying)  Pulse 55  Temp 97.6 °F (36.4 °C) (Oral)   Resp 16  Ht 182.9 cm  "(72.01\")  Wt 83.7 kg (184 lb 8.4 oz)  SpO2 94%  BMI 25.02 kg/m2    General Appearance:    Alert, cooperative, no distress, AAOx3                          Head:    Normocephalic, without obvious abnormality, atraumatic                           Eyes:    PERRL, conjunctiva/corneas clear, EOM's intact, both eyes                         Throat:   Lips, tongue, gums normal; oral mucosa pink and moist                           Neck:   Supple, symmetrical, trachea midline, no JVD                         Lungs:    Clear to auscultation bilaterally, respirations unlabored                 Chest Wall:    No tenderness or deformity, right IJ tunnel catheter in place                          Heart:    Regular rate and rhythm, S1 and S2 normal, no murmur,no  Rub                                      or gallop                  Abdomen:     Soft, non-tender, bowel sounds active, no masses, no                                                        organomegaly                  Extremities:   Right lower extremity exam unchanged with slight improvement in the erythema.                          Data Review:    I reviewed the patient's new clinical results.    Results from last 7 days  Lab Units 02/08/18  0514 02/07/18  0558 02/06/18  2212 02/06/18  1402 02/06/18  0440   WBC 10*3/mm3 5.73  --   --   --  7.48   HEMOGLOBIN g/dL 8.0* 8.2* 8.2* 6.8* 6.9*   PLATELETS 10*3/mm3 239  --   --   --  266       Results from last 7 days  Lab Units 02/08/18  0514 02/07/18  0558 02/06/18  0440   SODIUM mmol/L 138 139 135*   POTASSIUM mmol/L 3.9 3.9 4.9   CHLORIDE mmol/L 102 102 100   CO2 mmol/L 27.5 24.3 22.6   BUN mg/dL 36* 29* 63*   CREATININE mg/dL 2.78* 2.40* 3.85*   CALCIUM mg/dL 8.0* 8.0* 8.3*   GLUCOSE mg/dL 111* 98 258*     Culture data reviewed    Assessment:  1-bilateral lower extremity chronic edema with stasis changes and immobility related wound with no obvious active infectionAt this time but low threshold to recommend systemic " antibiotic therapy if he shows leukocytosis fever or change in mental status.  2-right heel decubitus changes with wound with no surrounding cellulitis this time but did risk of infection going forward for which she needs to be monitored.  3-anemia with progressive drop in hemoglobin likely due to GI loss.  4-hypercapnic hypoxemic respiratory failure on BiPAP  5-acute on chronic renal failure  6-underlying diabetes, hypertension, neuropathy  7-history of MRSA colonization.  Plan:  Continue to observe off of antibiotic therapy and low threshold to start antibiotic treatment consisting of vancomycin if his condition deteriorates or shows evolving septic parameters.    Bettie Gutierrez MD  2/8/2018  8:20 PM    Much of this encounter note is an electronic transcription/translation of spoken language to printed text. The electronic translation of spoken language may permit erroneous, or at times, nonsensical words or phrases to be inadvertently transcribed; Although I have reviewed the note for such errors, some may still exist

## 2018-02-09 NOTE — PLAN OF CARE
Problem: Pressure Ulcer (Adult)  Intervention: Promote/Optimize Nutrition  Added Nepro BID to promote wound healing and also added protein rich snacks for this reason.

## 2018-02-09 NOTE — PROGRESS NOTES
Continued Stay Note  [facility]     Patient Name: Kevin Mckeon V  MRN: [id number]  Today's Date: 2/9/2018    Admit Date: 2/5/2018          Discharge Plan       02/09/18 0858    Case Management/Social Work Plan    Plan [facility] - they are starting precert    Patient/Family In Agreement With Plan yes    Additional Comments Inbound call from [facility] and they are going to start precert for SNF. HD has not been set up. Called to Cian [phone] to inform of need to set up HD in [facility]. Faxed to her required paperwork and [employer] form to [phone]. She will watch for paper work at assist with set up. Sandra says Paty will be in touch with us.               Discharge Codes     None            Heath Chambers RN

## 2018-02-10 ENCOUNTER — APPOINTMENT (OUTPATIENT)
Dept: GENERAL RADIOLOGY | Facility: HOSPITAL | Age: 68
End: 2018-02-10

## 2018-02-10 LAB
ALBUMIN SERPL-MCNC: 2.2 G/DL (ref 3.5–5.2)
ANION GAP SERPL CALCULATED.3IONS-SCNC: 7.8 MMOL/L
BASOPHILS # BLD AUTO: 0.04 10*3/MM3 (ref 0–0.2)
BASOPHILS NFR BLD AUTO: 0.8 % (ref 0–1.5)
BUN BLD-MCNC: 24 MG/DL (ref 8–23)
BUN/CREAT SERPL: 8.6 (ref 7–25)
CALCIUM SPEC-SCNC: 8.1 MG/DL (ref 8.6–10.5)
CHLORIDE SERPL-SCNC: 101 MMOL/L (ref 98–107)
CO2 SERPL-SCNC: 29.2 MMOL/L (ref 22–29)
CREAT BLD-MCNC: 2.8 MG/DL (ref 0.76–1.27)
DEPRECATED RDW RBC AUTO: 51.3 FL (ref 37–54)
EOSINOPHIL # BLD AUTO: 0.14 10*3/MM3 (ref 0–0.7)
EOSINOPHIL NFR BLD AUTO: 2.8 % (ref 0.3–6.2)
ERYTHROCYTE [DISTWIDTH] IN BLOOD BY AUTOMATED COUNT: 15.4 % (ref 11.5–14.5)
GFR SERPL CREATININE-BSD FRML MDRD: 23 ML/MIN/1.73
GLUCOSE BLD-MCNC: 51 MG/DL (ref 65–99)
GLUCOSE BLDC GLUCOMTR-MCNC: 133 MG/DL (ref 70–130)
GLUCOSE BLDC GLUCOMTR-MCNC: 135 MG/DL (ref 70–130)
GLUCOSE BLDC GLUCOMTR-MCNC: 201 MG/DL (ref 70–130)
GLUCOSE BLDC GLUCOMTR-MCNC: 262 MG/DL (ref 70–130)
GLUCOSE BLDC GLUCOMTR-MCNC: 51 MG/DL (ref 70–130)
GLUCOSE BLDC GLUCOMTR-MCNC: 67 MG/DL (ref 70–130)
HBV CORE AB SER DONR QL IA: NEGATIVE
HBV E AG SERPL QL IA: NEGATIVE
HCT VFR BLD AUTO: 26.5 % (ref 40.4–52.2)
HGB BLD-MCNC: 7.8 G/DL (ref 13.7–17.6)
IMM GRANULOCYTES # BLD: 0.06 10*3/MM3 (ref 0–0.03)
IMM GRANULOCYTES NFR BLD: 1.2 % (ref 0–0.5)
LYMPHOCYTES # BLD AUTO: 1.39 10*3/MM3 (ref 0.9–4.8)
LYMPHOCYTES NFR BLD AUTO: 28 % (ref 19.6–45.3)
MCH RBC QN AUTO: 27.6 PG (ref 27–32.7)
MCHC RBC AUTO-ENTMCNC: 29.4 G/DL (ref 32.6–36.4)
MCV RBC AUTO: 93.6 FL (ref 79.8–96.2)
MONOCYTES # BLD AUTO: 0.47 10*3/MM3 (ref 0.2–1.2)
MONOCYTES NFR BLD AUTO: 9.5 % (ref 5–12)
NEUTROPHILS # BLD AUTO: 2.87 10*3/MM3 (ref 1.9–8.1)
NEUTROPHILS NFR BLD AUTO: 57.7 % (ref 42.7–76)
PHOSPHATE SERPL-MCNC: 3.9 MG/DL (ref 2.5–4.5)
PLATELET # BLD AUTO: 242 10*3/MM3 (ref 140–500)
PMV BLD AUTO: 10.4 FL (ref 6–12)
POTASSIUM BLD-SCNC: 4.1 MMOL/L (ref 3.5–5.2)
RBC # BLD AUTO: 2.83 10*6/MM3 (ref 4.6–6)
SODIUM BLD-SCNC: 138 MMOL/L (ref 136–145)
WBC NRBC COR # BLD: 4.97 10*3/MM3 (ref 4.5–10.7)

## 2018-02-10 PROCEDURE — 82962 GLUCOSE BLOOD TEST: CPT

## 2018-02-10 PROCEDURE — 25010000002 HEPARIN (PORCINE) PER 1000 UNITS: Performed by: INTERNAL MEDICINE

## 2018-02-10 PROCEDURE — 80069 RENAL FUNCTION PANEL: CPT | Performed by: INTERNAL MEDICINE

## 2018-02-10 PROCEDURE — 71046 X-RAY EXAM CHEST 2 VIEWS: CPT

## 2018-02-10 PROCEDURE — 25010000002 IRON SUCROSE PER 1 MG: Performed by: INTERNAL MEDICINE

## 2018-02-10 PROCEDURE — 85025 COMPLETE CBC W/AUTO DIFF WBC: CPT | Performed by: INTERNAL MEDICINE

## 2018-02-10 PROCEDURE — 63710000001 INSULIN ASPART PER 5 UNITS: Performed by: HOSPITALIST

## 2018-02-10 RX ADMIN — IRON SUCROSE 200 MG: 20 INJECTION, SOLUTION INTRAVENOUS at 16:28

## 2018-02-10 RX ADMIN — HYDROCODONE BITARTRATE AND ACETAMINOPHEN 1 TABLET: 5; 325 TABLET ORAL at 11:14

## 2018-02-10 RX ADMIN — TAMSULOSIN HYDROCHLORIDE 0.4 MG: 0.4 CAPSULE ORAL at 21:52

## 2018-02-10 RX ADMIN — PANTOPRAZOLE SODIUM 40 MG: 40 TABLET, DELAYED RELEASE ORAL at 16:33

## 2018-02-10 RX ADMIN — METOPROLOL TARTRATE 50 MG: 50 TABLET, FILM COATED ORAL at 08:27

## 2018-02-10 RX ADMIN — SODIUM BICARBONATE 650 MG: 650 TABLET ORAL at 16:28

## 2018-02-10 RX ADMIN — VITAMIN D, TAB 1000IU (100/BT) 1000 UNITS: 25 TAB at 08:27

## 2018-02-10 RX ADMIN — HYDROCODONE BITARTRATE AND ACETAMINOPHEN 1 TABLET: 5; 325 TABLET ORAL at 23:40

## 2018-02-10 RX ADMIN — GABAPENTIN 300 MG: 300 CAPSULE ORAL at 08:28

## 2018-02-10 RX ADMIN — SODIUM BICARBONATE 650 MG: 650 TABLET ORAL at 08:27

## 2018-02-10 RX ADMIN — HYDROCODONE BITARTRATE AND ACETAMINOPHEN 1 TABLET: 5; 325 TABLET ORAL at 19:27

## 2018-02-10 RX ADMIN — GABAPENTIN 300 MG: 300 CAPSULE ORAL at 22:02

## 2018-02-10 RX ADMIN — TRAZODONE HYDROCHLORIDE 100 MG: 100 TABLET, FILM COATED ORAL at 21:52

## 2018-02-10 RX ADMIN — BUPROPION HYDROCHLORIDE 200 MG: 100 TABLET, EXTENDED RELEASE ORAL at 21:53

## 2018-02-10 RX ADMIN — HYDROCODONE BITARTRATE AND ACETAMINOPHEN 1 TABLET: 5; 325 TABLET ORAL at 03:40

## 2018-02-10 RX ADMIN — SODIUM BICARBONATE 650 MG: 650 TABLET ORAL at 21:52

## 2018-02-10 RX ADMIN — CLONIDINE HYDROCHLORIDE 0.2 MG: 0.1 TABLET ORAL at 21:52

## 2018-02-10 RX ADMIN — HYDRALAZINE HYDROCHLORIDE 100 MG: 50 TABLET, FILM COATED ORAL at 13:27

## 2018-02-10 RX ADMIN — INSULIN ASPART 4 UNITS: 100 INJECTION, SOLUTION INTRAVENOUS; SUBCUTANEOUS at 17:37

## 2018-02-10 RX ADMIN — RISPERIDONE 2 MG: 1 TABLET ORAL at 21:53

## 2018-02-10 RX ADMIN — BUMETANIDE 2 MG: 2 TABLET ORAL at 08:27

## 2018-02-10 RX ADMIN — PANTOPRAZOLE SODIUM 40 MG: 40 TABLET, DELAYED RELEASE ORAL at 08:28

## 2018-02-10 RX ADMIN — BUMETANIDE 2 MG: 2 TABLET ORAL at 21:52

## 2018-02-10 RX ADMIN — HEPARIN SODIUM 5000 UNITS: 5000 INJECTION, SOLUTION INTRAVENOUS; SUBCUTANEOUS at 08:28

## 2018-02-10 RX ADMIN — AMLODIPINE BESYLATE 10 MG: 10 TABLET ORAL at 08:27

## 2018-02-10 RX ADMIN — HEPARIN SODIUM 5000 UNITS: 5000 INJECTION, SOLUTION INTRAVENOUS; SUBCUTANEOUS at 21:53

## 2018-02-10 RX ADMIN — BUPROPION HYDROCHLORIDE 200 MG: 100 TABLET, EXTENDED RELEASE ORAL at 08:29

## 2018-02-10 RX ADMIN — HYDRALAZINE HYDROCHLORIDE 100 MG: 50 TABLET, FILM COATED ORAL at 05:25

## 2018-02-10 RX ADMIN — HYDRALAZINE HYDROCHLORIDE 100 MG: 50 TABLET, FILM COATED ORAL at 21:52

## 2018-02-10 RX ADMIN — CLONIDINE HYDROCHLORIDE 0.2 MG: 0.1 TABLET ORAL at 08:27

## 2018-02-10 RX ADMIN — METOPROLOL TARTRATE 50 MG: 50 TABLET, FILM COATED ORAL at 21:52

## 2018-02-10 RX ADMIN — INSULIN ASPART 6 UNITS: 100 INJECTION, SOLUTION INTRAVENOUS; SUBCUTANEOUS at 12:35

## 2018-02-10 RX ADMIN — ASPIRIN 81 MG: 81 TABLET, CHEWABLE ORAL at 08:27

## 2018-02-10 RX ADMIN — CITALOPRAM 20 MG: 20 TABLET, FILM COATED ORAL at 08:27

## 2018-02-10 RX ADMIN — HYDROCODONE BITARTRATE AND ACETAMINOPHEN 1 TABLET: 5; 325 TABLET ORAL at 15:19

## 2018-02-10 NOTE — PROGRESS NOTES
Subjective except chronic foot pain has no c/o denies cp/sob   Chief complaint:esrd         Objective:nad      Vital Signs  Temp:  [97 °F (36.1 °C)-98.8 °F (37.1 °C)] 98 °F (36.7 °C)  Heart Rate:  [58-65] 65  Resp:  [16-18] 18  BP: (139-157)/(67-86) 157/86        Intake/Output Summary (Last 24 hours) at 02/10/18 1554  Last data filed at 02/10/18 1344   Gross per 24 hour   Intake              700 ml   Output             4300 ml   Net            -3600 ml           Physical Exam    Constitutional : well developed ,No acute distress  ENMT lips pink oral mucosa moist   Eyes sclerae white PERRL  Respiratory: Clear to auscultation , No crackles no wheezing respirations are even and un-labored  GI: Soft, Non tender, BS active in all 4 quadrants  CVS: S1 S2 regular, no rub murmur or gallops  Skin warm dry no rashes no petechiae multiple wounds heel right below knee right   Neuro grossly nonfocal cranial nerves 2-12 grossly intact sensation intact  Ext: b/l LE edema, Peripheral pulses intact   Heme no cervical  lymphadenopathy no petechiae  RIJ TDC no signs of inflammation         Results Review:      Lab Results   Component Value Date    GLUCOSE 51 (L) 02/10/2018    CALCIUM 8.1 (L) 02/10/2018     02/10/2018    K 4.1 02/10/2018    CO2 29.2 (H) 02/10/2018     02/10/2018    BUN 24 (H) 02/10/2018    CREATININE 2.80 (H) 02/10/2018    EGFRIFNONA 23 (L) 02/10/2018    BCR 8.6 02/10/2018    ANIONGAP 7.8 02/10/2018       Lab Results   Component Value Date    WBC 4.97 02/10/2018    HGB 7.8 (L) 02/10/2018    HCT 26.5 (L) 02/10/2018    MCV 93.6 02/10/2018     02/10/2018       Pertinent Imaging studies were reviewed      Medication Review:       Current Facility-Administered Medications:   •  acetaminophen (TYLENOL) tablet 650 mg, 650 mg, Oral, Q6H PRN, Hank Joaquin MD  •  amLODIPine (NORVASC) tablet 10 mg, 10 mg, Oral, Q24H, Hank Joaquin MD, 10 mg at 02/10/18 0827  •  arformoterol (BROVANA) nebulizer  solution 15 mcg, 15 mcg, Nebulization, BID - RT, Tony Mccauley MD, 15 mcg at 02/09/18 0856  •  aspirin chewable tablet 81 mg, 81 mg, Oral, Daily, Hank Joaquin MD, 81 mg at 02/10/18 0827  •  budesonide (PULMICORT) nebulizer solution 0.25 mg, 0.25 mg, Nebulization, BID - RT, Tony Mccauley MD, 0.25 mg at 02/09/18 0856  •  bumetanide (BUMEX) tablet 2 mg, 2 mg, Oral, BID, Best Fletcher MD, 2 mg at 02/10/18 0827  •  buPROPion SR (WELLBUTRIN SR) 12 hr tablet 200 mg, 200 mg, Oral, Q12H, Hank Joaquin MD, 200 mg at 02/10/18 0829  •  ceFAZolin in dextrose (ANCEF) IVPB solution 2 g, 2 g, Intravenous, Once, Nitin Coronel MD  •  cholecalciferol (VITAMIN D3) tablet 1,000 Units, 1,000 Units, Oral, Daily, Hank Joaquin MD, 1,000 Units at 02/10/18 0827  •  citalopram (CeleXA) tablet 20 mg, 20 mg, Oral, Daily, Hank Joaquin MD, 20 mg at 02/10/18 0827  •  CloNIDine (CATAPRES) tablet 0.2 mg, 0.2 mg, Oral, Q12H, Hank Joaquin MD, 0.2 mg at 02/10/18 0827  •  dextrose (D50W) solution 25 g, 25 g, Intravenous, Q15 Min PRN, Danilo Del Valle MD  •  dextrose (GLUTOSE) oral gel 15 g, 15 g, Oral, Q15 Min PRN, Danilo Del Valle MD  •  epoetin chloe (EPOGEN,PROCRIT) injection 20,000 Units, 20,000 Units, Subcutaneous, Weekly, Hank Joaquin MD, 20,000 Units at 02/06/18 1844  •  gabapentin (NEURONTIN) capsule 300 mg, 300 mg, Oral, Q12H, Hank Joaquin MD, 300 mg at 02/10/18 0828  •  glucagon (human recombinant) (GLUCAGEN DIAGNOSTIC) injection 1 mg, 1 mg, Subcutaneous, PRN, Danilo Del Valle MD  •  heparin (porcine) 5000 UNIT/ML injection 5,000 Units, 5,000 Units, Subcutaneous, Q12H, Best Fletcher MD, 5,000 Units at 02/10/18 0828  •  heparin (porcine) injection 3,000 Units, 3,000 Units, Intracatheter, Once, Tutu Smith MD  •  hydrALAZINE (APRESOLINE) tablet 100 mg, 100 mg, Oral, Q8H, Best Fletcher MD, 100 mg at 02/10/18 1327  •  hydrocerin (EUCERIN) cream, , Topical,  Daily, He Strange MD  •  HYDROcodone-acetaminophen (NORCO) 5-325 MG per tablet 1 tablet, 1 tablet, Oral, Q4H PRN, Hank Joaquin MD, 1 tablet at 02/10/18 1519  •  insulin aspart (novoLOG) injection 0-9 Units, 0-9 Units, Subcutaneous, 4x Daily With Meals & Nightly, Danilo Del Valle MD, 6 Units at 02/10/18 1235  •  insulin detemir (LEVEMIR) injection 30 Units, 30 Units, Subcutaneous, Nightly, Alan Arnett MD, 30 Units at 02/09/18 2213  •  ipratropium-albuterol (DUO-NEB) nebulizer solution 3 mL, 3 mL, Nebulization, Q6H While Awake - RT, He Strange MD, 3 mL at 02/09/18 1501  •  iron sucrose (VENOFER) 200 mg in sodium chloride 0.9 % 100 mL IVPB, 200 mg, Intravenous, Q24H, Hank Joaquin MD, Last Rate: 100 mL/hr at 02/07/18 1618, 200 mg at 02/09/18 1601  •  metoprolol tartrate (LOPRESSOR) tablet 50 mg, 50 mg, Oral, Q12H, Hank Joaquin MD, 50 mg at 02/10/18 0827  •  pantoprazole (PROTONIX) EC tablet 40 mg, 40 mg, Oral, BID AC, Danilo Hensley MD, 40 mg at 02/10/18 0828  •  risperiDONE (risperDAL) tablet 2 mg, 2 mg, Oral, Nightly, Hank Joaquin MD, 2 mg at 02/09/18 2106  •  sodium bicarbonate tablet 650 mg, 650 mg, Oral, TID, Hank Joaquin MD, 650 mg at 02/10/18 0827  •  tamsulosin (FLOMAX) 24 hr capsule 0.4 mg, 0.4 mg, Oral, Nightly, Hank Joaquin MD, 0.4 mg at 02/09/18 2107  •  traMADol (ULTRAM) tablet 50 mg, 50 mg, Oral, Q6H PRN, Gage Saavedra Jr., MD  •  traZODone (DESYREL) tablet 100 mg, 100 mg, Oral, Nightly, Hank Joaquin MD, 100 mg at 02/09/18 2107       Assesment  End-stage renal disease, initiated on hemodialysis  Anemia of chronic disease with iron deficiency  Lower Extremity edema, better with dialysis/ultrafiltration  Diabetes mellitus  Multiple skin ulcers, no antibiotics or vascular intervention needed per consultants  Decubitus ulcers  Deconditioning, physical therapy following  COPD  Hypertension  Tobacco  abuse  Peripheral vascular disease  Erosive esophagitis          Plan:  Appreciate all consultants input  Check bp outliers manually by RN  Add ARB tomorrow if still high bp  Continue diuretic still edema but beter  Continue TIW HD  Awaiting precert for rehab  chair in [facility] HD unit starting 2/14/2018  Likely d/c on Monday after HD   D/w RN and patient        Best Fletcher MD  02/10/18  3:54 PM  Tel: [phone]  Fax: [phone]

## 2018-02-10 NOTE — PROGRESS NOTES
Infectious Diseases Progress Note    Bettie Gutierrez MD     [facility]  [place]: 4 days  Patient Identification:  Name: Kevin Mckeon V  Age: 67 y.o.  Sex: male  :  1950  MRN: [id number]         Primary Care Physician: Alicia Harley MD            Subjective: no new complaints  Interval History: Underwent right IJ tunneled catheter placement.    Objective:    Scheduled Meds:    amLODIPine 10 mg Oral Q24H   arformoterol 15 mcg Nebulization BID - RT   aspirin 81 mg Oral Daily   budesonide 0.25 mg Nebulization BID - RT   bumetanide 2 mg Oral BID   buPROPion  mg Oral Q12H   ceFAZolin 2 g Intravenous Once   cholecalciferol 1,000 Units Oral Daily   citalopram 20 mg Oral Daily   CloNIDine 0.2 mg Oral Q12H   epoetin chloe 20,000 Units Subcutaneous Weekly   gabapentin 300 mg Oral Q12H   heparin (porcine) 5,000 Units Subcutaneous Q12H   heparin (porcine) 3,000 Units Intracatheter Once   hydrALAZINE 100 mg Oral Q8H   hydrocerin  Topical Daily   insulin aspart 0-9 Units Subcutaneous 4x Daily With Meals & Nightly   insulin detemir 30 Units Subcutaneous Nightly   ipratropium-albuterol 3 mL Nebulization Q6H While Awake - RT   iron sucrose 200 mg Intravenous Q24H   metoprolol tartrate 50 mg Oral Q12H   pantoprazole 40 mg Oral BID AC   risperiDONE 2 mg Oral Nightly   sodium bicarbonate 650 mg Oral TID   tamsulosin 0.4 mg Oral Nightly   traZODone 100 mg Oral Nightly     Continuous Infusions:     Vital signs in last 24 hours:  Temp:  [98.8 °F (37.1 °C)-98.9 °F (37.2 °C)] 98.8 °F (37.1 °C)  Heart Rate:  [61-70] 61  Resp:  [18] 18  BP: (144-182)/(86-92) 144/86    Intake/Output:    Intake/Output Summary (Last 24 hours) at 18 2358  Last data filed at 18 1700   Gross per 24 hour   Intake             1080 ml   Output             3750 ml   Net            -2670 ml       Exam:  /86 (BP Location: Left arm, Patient Position: Lying)  Pulse 61  Temp 98.8 °F (37.1 °C) (Oral)   Resp 18  Ht 182.9 cm  "(72.01\")  Wt 83.7 kg (184 lb 8.4 oz)  SpO2 92%  BMI 25.02 kg/m2    General Appearance:    Alert, cooperative, no distress, AAOx3                          Head:    Normocephalic, without obvious abnormality, atraumatic                           Eyes:    PERRL, conjunctiva/corneas clear, EOM's intact, both eyes                         Throat:   Lips, tongue, gums normal; oral mucosa pink and moist                           Neck:   Supple, symmetrical, trachea midline, no JVD                         Lungs:    Clear to auscultation bilaterally, respirations unlabored                 Chest Wall:    No tenderness or deformity, right IJ tunnel catheter in place                          Heart:    Regular rate and rhythm, S1 and S2 normal, no murmur,no  Rub                                      or gallop                  Abdomen:     Soft, non-tender, bowel sounds active, no masses, no                                                        organomegaly                  Extremities:   Right lower extremity exam unchanged with slight improvement in the erythema.                          Data Review:    I reviewed the patient's new clinical results.    Results from last 7 days  Lab Units 02/09/18  0509 02/08/18  0514 02/07/18  0558 02/06/18  2212 02/06/18  1402 02/06/18  0440   WBC 10*3/mm3 5.01 5.73  --   --   --  7.48   HEMOGLOBIN g/dL 8.0* 8.0* 8.2* 8.2* 6.8* 6.9*   PLATELETS 10*3/mm3 239 239  --   --   --  266       Results from last 7 days  Lab Units 02/09/18  0509 02/08/18  0514 02/07/18  0558 02/06/18  0440   SODIUM mmol/L 139 138 139 135*   POTASSIUM mmol/L 3.8 3.9 3.9 4.9   CHLORIDE mmol/L 102 102 102 100   CO2 mmol/L 29.2* 27.5 24.3 22.6   BUN mg/dL 18 36* 29* 63*   CREATININE mg/dL 2.01* 2.78* 2.40* 3.85*   CALCIUM mg/dL 8.2* 8.0* 8.0* 8.3*   GLUCOSE mg/dL 88 111* 98 258*     Culture data reviewed    Assessment:  1-bilateral lower extremity chronic edema with stasis changes and immobility related wound with no " obvious active infectionAt this time but low threshold to recommend systemic antibiotic therapy if he shows leukocytosis fever or change in mental status.  2-right heel decubitus changes with wound with no surrounding cellulitis this time but did risk of infection going forward for which she needs to be monitored.  3-anemia with progressive drop in hemoglobin likely due to GI loss.  4-hypercapnic hypoxemic respiratory failure on BiPAP  5-acute on chronic renal failure  6-underlying diabetes, hypertension, neuropathy  7-history of MRSA colonization.  Plan:  Continue to observe off of antibiotic therapy and low threshold to start antibiotic treatment consisting of vancomycin if his condition deteriorates or shows evolving septic parameters.    Bettie Gutierrez MD  2/9/2018  11:58 PM    Much of this encounter note is an electronic transcription/translation of spoken language to printed text. The electronic translation of spoken language may permit erroneous, or at times, nonsensical words or phrases to be inadvertently transcribed; Although I have reviewed the note for such errors, some may still exist

## 2018-02-10 NOTE — PLAN OF CARE
Problem: Patient Care Overview (Adult)  Goal: Plan of Care Review  Outcome: Ongoing (interventions implemented as appropriate)   02/10/18 4722   Coping/Psychosocial Response Interventions   Plan Of Care Reviewed With patient   Outcome Evaluation   Outcome Summary/Follow up Plan pt medicated prn for foot nella, dressing changed on right heel, vss, sr on monitor., satting mid 90s on 3 liters oxygen, treated for hypoglycemia this morning, gien ssi with lunch.     Goal: Adult Individualization and Mutuality  Outcome: Ongoing (interventions implemented as appropriate)    Goal: Discharge Needs Assessment  Outcome: Ongoing (interventions implemented as appropriate)      Problem: Fall Risk (Adult)  Goal: Absence of Falls  Outcome: Ongoing (interventions implemented as appropriate)      Problem: Renal Failure/Kidney Injury, Acute (Adult)  Goal: Signs and Symptoms of Listed Potential Problems Will be Absent or Manageable (Renal Failure/Kidney Injury, Acute)  Outcome: Ongoing (interventions implemented as appropriate)      Problem: Diabetes, Type 2 (Adult)  Goal: Signs and Symptoms of Listed Potential Problems Will be Absent or Manageable (Diabetes, Type 2)  Outcome: Ongoing (interventions implemented as appropriate)      Problem: Pressure Ulcer (Adult)  Goal: Signs and Symptoms of Listed Potential Problems Will be Absent or Manageable (Pressure Ulcer)  Outcome: Ongoing (interventions implemented as appropriate)      Problem: Perioperative Period (Adult)  Goal: Signs and Symptoms of Listed Potential Problems Will be Absent or Manageable (Perioperative Period)  Outcome: Ongoing (interventions implemented as appropriate)

## 2018-02-10 NOTE — PROGRESS NOTES
[facility]  Phone: [phone]  Dimitri Davila MD    Subjective:  LOS: 5    Has COPD but does not use O2 or inhalers at home.    Objective   Vital Signs past 24hrs  BP range: BP: (139-157)/(67-86) 150/82  Pulse range: Heart Rate:  [58-65] 65  Resp rate range: Resp:  [16-18] 18  Temp range: Temp (24hrs), Av.9 °F (36.6 °C), Min:97 °F (36.1 °C), Max:98.8 °F (37.1 °C)    O2 Device: nasal cannulaFlow (L/min):  [3-4] 4  Oxygen range:SpO2:  [92 %-97 %] 92 %   83.7 kg (184 lb 8.4 oz); Body mass index is 25.02 kg/(m^2).    Intake/Output Summary (Last 24 hours) at 02/10/18 1641  Last data filed at 02/10/18 1344   Gross per 24 hour   Intake              700 ml   Output             4300 ml   Net            -3600 ml       Physical Exam   Cardiovascular: Normal rate and regular rhythm.    No murmur heard.  Pulmonary/Chest: He has decreased breath sounds. He has no wheezes. He has rales in the right lower field and the left lower field.   Abdominal: Soft. Bowel sounds are normal. There is no tenderness.   Musculoskeletal: He exhibits edema.   LE cellulitis   Neurological: He is alert.   Nursing note and vitals reviewed.    Results Review:    I have reviewed the laboratory and imaging data since the last note by [facility] physician.  My annotations are noted in assessment and plan.    Medication Review:  I have reviewed the current MAR.  My annotations are noted in assessment and plan.       Plan   PCCM Problems  AHRF  Acute fluid overload  Bilateral pleural effusions  MING on CPAP  COPD without exac  Relevant Medical Diagnoses  ESRD  DM2  Cellulitis    Plan of Treatment  Note HD on Monday. Will check walkox after HD to see if he can come off O2.    Not on rx for COPD so presumably mild.    Continue home CPAP.    Manage fluid overload with ESRD per renal.    Dimitri Davila MD  02/10/18  4:41 PM    Part of this note may be an electronic transcription/translation of spoken language to printed text using the Dragon Dictation  System.

## 2018-02-10 NOTE — PLAN OF CARE
Problem: Patient Care Overview (Adult)  Goal: Plan of Care Review  Outcome: Ongoing (interventions implemented as appropriate)   02/10/18 0331   Coping/Psychosocial Response Interventions   Plan Of Care Reviewed With patient   Patient Care Overview   Progress no change   Outcome Evaluation   Outcome Summary/Follow up Plan No s/sx of distress noted at this time. Rested well throughout night. Vital signs WDL. Fall precautions maintained. Weight shift assistance provided. heels floated. accumax in use. Dialysis cath site WDL. Will cont to monitor.      Goal: Adult Individualization and Mutuality  Outcome: Ongoing (interventions implemented as appropriate)    Goal: Discharge Needs Assessment  Outcome: Ongoing (interventions implemented as appropriate)      Problem: Fall Risk (Adult)  Goal: Absence of Falls  Outcome: Ongoing (interventions implemented as appropriate)      Problem: Renal Failure/Kidney Injury, Acute (Adult)  Goal: Signs and Symptoms of Listed Potential Problems Will be Absent or Manageable (Renal Failure/Kidney Injury, Acute)  Outcome: Ongoing (interventions implemented as appropriate)      Problem: Diabetes, Type 2 (Adult)  Goal: Signs and Symptoms of Listed Potential Problems Will be Absent or Manageable (Diabetes, Type 2)  Outcome: Ongoing (interventions implemented as appropriate)      Problem: Pressure Ulcer (Adult)  Goal: Signs and Symptoms of Listed Potential Problems Will be Absent or Manageable (Pressure Ulcer)  Outcome: Ongoing (interventions implemented as appropriate)      Problem: Perioperative Period (Adult)  Goal: Signs and Symptoms of Listed Potential Problems Will be Absent or Manageable (Perioperative Period)  Outcome: Ongoing (interventions implemented as appropriate)

## 2018-02-11 LAB
ALBUMIN SERPL-MCNC: 2.6 G/DL (ref 3.5–5.2)
ANION GAP SERPL CALCULATED.3IONS-SCNC: 9.8 MMOL/L
BUN BLD-MCNC: 34 MG/DL (ref 8–23)
BUN/CREAT SERPL: 10.9 (ref 7–25)
CALCIUM SPEC-SCNC: 8.1 MG/DL (ref 8.6–10.5)
CHLORIDE SERPL-SCNC: 103 MMOL/L (ref 98–107)
CO2 SERPL-SCNC: 26.2 MMOL/L (ref 22–29)
CREAT BLD-MCNC: 3.11 MG/DL (ref 0.76–1.27)
DEPRECATED RDW RBC AUTO: 53 FL (ref 37–54)
ERYTHROCYTE [DISTWIDTH] IN BLOOD BY AUTOMATED COUNT: 15.9 % (ref 11.5–14.5)
GFR SERPL CREATININE-BSD FRML MDRD: 20 ML/MIN/1.73
GLUCOSE BLD-MCNC: 65 MG/DL (ref 65–99)
GLUCOSE BLDC GLUCOMTR-MCNC: 107 MG/DL (ref 70–130)
GLUCOSE BLDC GLUCOMTR-MCNC: 179 MG/DL (ref 70–130)
GLUCOSE BLDC GLUCOMTR-MCNC: 215 MG/DL (ref 70–130)
GLUCOSE BLDC GLUCOMTR-MCNC: 72 MG/DL (ref 70–130)
HCT VFR BLD AUTO: 31.9 % (ref 40.4–52.2)
HGB BLD-MCNC: 9.3 G/DL (ref 13.7–17.6)
MCH RBC QN AUTO: 27.5 PG (ref 27–32.7)
MCHC RBC AUTO-ENTMCNC: 29.2 G/DL (ref 32.6–36.4)
MCV RBC AUTO: 94.4 FL (ref 79.8–96.2)
PHOSPHATE SERPL-MCNC: 3.3 MG/DL (ref 2.5–4.5)
PLATELET # BLD AUTO: 257 10*3/MM3 (ref 140–500)
PMV BLD AUTO: 10.5 FL (ref 6–12)
POTASSIUM BLD-SCNC: 4.4 MMOL/L (ref 3.5–5.2)
RBC # BLD AUTO: 3.38 10*6/MM3 (ref 4.6–6)
SODIUM BLD-SCNC: 139 MMOL/L (ref 136–145)
WBC NRBC COR # BLD: 8.37 10*3/MM3 (ref 4.5–10.7)

## 2018-02-11 PROCEDURE — 63710000001 INSULIN ASPART PER 5 UNITS: Performed by: HOSPITALIST

## 2018-02-11 PROCEDURE — 25010000002 HEPARIN (PORCINE) PER 1000 UNITS: Performed by: INTERNAL MEDICINE

## 2018-02-11 PROCEDURE — 85027 COMPLETE CBC AUTOMATED: CPT | Performed by: INTERNAL MEDICINE

## 2018-02-11 PROCEDURE — 97110 THERAPEUTIC EXERCISES: CPT

## 2018-02-11 PROCEDURE — 80069 RENAL FUNCTION PANEL: CPT | Performed by: INTERNAL MEDICINE

## 2018-02-11 PROCEDURE — 82962 GLUCOSE BLOOD TEST: CPT

## 2018-02-11 RX ORDER — ALBUMIN (HUMAN) 12.5 G/50ML
12.5 SOLUTION INTRAVENOUS AS NEEDED
Status: ACTIVE | OUTPATIENT
Start: 2018-02-11 | End: 2018-02-12

## 2018-02-11 RX ADMIN — INSULIN ASPART 4 UNITS: 100 INJECTION, SOLUTION INTRAVENOUS; SUBCUTANEOUS at 22:35

## 2018-02-11 RX ADMIN — HYDROCODONE BITARTRATE AND ACETAMINOPHEN 1 TABLET: 5; 325 TABLET ORAL at 04:12

## 2018-02-11 RX ADMIN — TRAZODONE HYDROCHLORIDE 100 MG: 100 TABLET, FILM COATED ORAL at 21:34

## 2018-02-11 RX ADMIN — HYDRALAZINE HYDROCHLORIDE 100 MG: 50 TABLET, FILM COATED ORAL at 05:40

## 2018-02-11 RX ADMIN — Medication: at 17:38

## 2018-02-11 RX ADMIN — AMLODIPINE BESYLATE 10 MG: 10 TABLET ORAL at 08:23

## 2018-02-11 RX ADMIN — HYDROCODONE BITARTRATE AND ACETAMINOPHEN 1 TABLET: 5; 325 TABLET ORAL at 13:18

## 2018-02-11 RX ADMIN — SODIUM BICARBONATE 650 MG: 650 TABLET ORAL at 17:44

## 2018-02-11 RX ADMIN — TAMSULOSIN HYDROCHLORIDE 0.4 MG: 0.4 CAPSULE ORAL at 21:34

## 2018-02-11 RX ADMIN — BUMETANIDE 2 MG: 2 TABLET ORAL at 21:34

## 2018-02-11 RX ADMIN — HYDRALAZINE HYDROCHLORIDE 100 MG: 50 TABLET, FILM COATED ORAL at 23:04

## 2018-02-11 RX ADMIN — GABAPENTIN 300 MG: 300 CAPSULE ORAL at 21:35

## 2018-02-11 RX ADMIN — BUPROPION HYDROCHLORIDE 200 MG: 100 TABLET, EXTENDED RELEASE ORAL at 21:36

## 2018-02-11 RX ADMIN — BUPROPION HYDROCHLORIDE 200 MG: 100 TABLET, EXTENDED RELEASE ORAL at 08:22

## 2018-02-11 RX ADMIN — CITALOPRAM 20 MG: 20 TABLET, FILM COATED ORAL at 08:23

## 2018-02-11 RX ADMIN — CLONIDINE HYDROCHLORIDE 0.2 MG: 0.1 TABLET ORAL at 08:23

## 2018-02-11 RX ADMIN — RISPERIDONE 2 MG: 1 TABLET ORAL at 21:35

## 2018-02-11 RX ADMIN — HEPARIN SODIUM 5000 UNITS: 5000 INJECTION, SOLUTION INTRAVENOUS; SUBCUTANEOUS at 08:23

## 2018-02-11 RX ADMIN — VITAMIN D, TAB 1000IU (100/BT) 1000 UNITS: 25 TAB at 08:23

## 2018-02-11 RX ADMIN — CLONIDINE HYDROCHLORIDE 0.2 MG: 0.1 TABLET ORAL at 21:34

## 2018-02-11 RX ADMIN — HYDROCODONE BITARTRATE AND ACETAMINOPHEN 1 TABLET: 5; 325 TABLET ORAL at 17:39

## 2018-02-11 RX ADMIN — HYDRALAZINE HYDROCHLORIDE 100 MG: 50 TABLET, FILM COATED ORAL at 17:38

## 2018-02-11 RX ADMIN — HYDROCODONE BITARTRATE AND ACETAMINOPHEN 1 TABLET: 5; 325 TABLET ORAL at 21:35

## 2018-02-11 RX ADMIN — PANTOPRAZOLE SODIUM 40 MG: 40 TABLET, DELAYED RELEASE ORAL at 08:22

## 2018-02-11 RX ADMIN — SODIUM BICARBONATE 650 MG: 650 TABLET ORAL at 08:23

## 2018-02-11 RX ADMIN — GABAPENTIN 300 MG: 300 CAPSULE ORAL at 08:22

## 2018-02-11 RX ADMIN — METOPROLOL TARTRATE 50 MG: 50 TABLET, FILM COATED ORAL at 21:36

## 2018-02-11 RX ADMIN — HEPARIN SODIUM 5000 UNITS: 5000 INJECTION, SOLUTION INTRAVENOUS; SUBCUTANEOUS at 23:02

## 2018-02-11 RX ADMIN — HYDROCODONE BITARTRATE AND ACETAMINOPHEN 1 TABLET: 5; 325 TABLET ORAL at 08:22

## 2018-02-11 RX ADMIN — PANTOPRAZOLE SODIUM 40 MG: 40 TABLET, DELAYED RELEASE ORAL at 17:44

## 2018-02-11 RX ADMIN — BUMETANIDE 2 MG: 2 TABLET ORAL at 08:23

## 2018-02-11 RX ADMIN — INSULIN ASPART 2 UNITS: 100 INJECTION, SOLUTION INTRAVENOUS; SUBCUTANEOUS at 17:30

## 2018-02-11 RX ADMIN — ASPIRIN 81 MG: 81 TABLET, CHEWABLE ORAL at 08:22

## 2018-02-11 RX ADMIN — METOPROLOL TARTRATE 50 MG: 50 TABLET, FILM COATED ORAL at 08:23

## 2018-02-11 NOTE — PLAN OF CARE
Problem: Patient Care Overview (Adult)  Goal: Plan of Care Review  Outcome: Ongoing (interventions implemented as appropriate)   02/11/18 1019   Coping/Psychosocial Response Interventions   Plan Of Care Reviewed With patient   Outcome Evaluation   Outcome Summary/Follow up Plan Pt limited in his ambulation distance today secondary to fatigue. He continues to require MinAx1 and the use of a RWx during transfers and ambulation. Pt will benefit from continuing skilled PT for further improvements in his independence with functional mobility.

## 2018-02-11 NOTE — THERAPY TREATMENT NOTE
Acute Care - Physical Therapy Treatment Note  [facility]     Patient Name: Kevin Mckeon V  : 1950  MRN: [id number]  Today's Date: 2018  Onset of Illness/Injury or Date of Surgery Date: 18  Date of Referral to PT: 18  Referring Physician: Mikey    Admit Date: 2018    Visit Dx:  No diagnosis found.  Patient Active Problem List   Diagnosis   • DKA (diabetic ketoacidoses)   • Early satiety   • ARF (acute renal failure)   • CKD (chronic kidney disease) stage 4, GFR 15-29 ml/min   • Nephrotic range proteinuria   • DM (diabetes mellitus) type II controlled with renal manifestation   • Anemia of chronic renal failure   • Ulcer of right knee   • Acute renal failure (ARF)               Adult Rehabilitation Note       18 0950 18 1002       Rehab Assessment/Intervention    Discipline physical therapist  -DO physical therapist  -CH     Document Type therapy note (daily note)  -DO therapy note (daily note)  -CH     Subjective Information no complaints;agree to therapy  -DO agree to therapy  -CH     Patient Effort, Rehab Treatment good  -DO good  -CH     Symptoms Noted During/After Treatment fatigue  -DO none  -CH     Precautions/Limitations fall precautions  -DO fall precautions  -CH     Patient Response to Treatment Pt did not demonstrate any adverse s/s throughout treatment; VSS throughout.   -DO      Recorded by [DO] Chun Brown, PT [CH] Taylor Sarabia, PT     Pain Assessment    Pain Assessment Land-Levy FACES  -DO 0-10  -CH     Land-Levy FACES Pain Rating 4  -DO      Pain Score  4  -CH     Pain Location Foot  -DO Foot  -CH     Pain Orientation Left  -DO Left  -CH     Pain Intervention(s) Medication (See MAR);Repositioned;Ambulation/increased activity  -DO Repositioned  -CH     Recorded by [DO] Chun Brown, PT [CH] Taylor Sarabia, PT     Cognitive Assessment/Intervention    Current Cognitive/Communication Assessment functional  -DO functional  -CH     Orientation  Status oriented x 4  -DO oriented x 4  -     Follows Commands/Answers Questions 100% of the time;able to follow multi-step instructions  -% of the time  -     Personal Safety WNL/WFL  -DO WNL/WFL  -CH     Personal Safety Interventions fall prevention program maintained;gait belt;muscle strengthening facilitated;nonskid shoes/slippers when out of bed  -DO fall prevention program maintained;gait belt;nonskid shoes/slippers when out of bed  -CH     Recorded by [DO] Chun Brown, PT [CH] Taylor Sarabia PT     Bed Mobility, Assessment/Treatment    Bed Mob, Supine to Sit, Early minimum assist (75% patient effort);verbal cues required;nonverbal cues required (demo/gesture)  -DO verbal cues required;nonverbal cues required (demo/gesture);contact guard assist  -     Bed Mob, Sit to Supine, Early minimum assist (75% patient effort);verbal cues required;nonverbal cues required (demo/gesture)  -DO verbal cues required;nonverbal cues required (demo/gesture);contact guard assist  -     Bed Mobility, Comment Pt required HHA to assist in pulling himself up to the EOB.   -DO      Recorded by [DO] Chun Brown, PT [] Taylor Sarabia PT     Transfer Assessment/Treatment    Transfers, Sit-Stand Early minimum assist (75% patient effort);verbal cues required;nonverbal cues required (demo/gesture)  -DO verbal cues required;nonverbal cues required (demo/gesture);minimum assist (75% patient effort);2 person assist required  -     Transfers, Stand-Sit Early minimum assist (75% patient effort);verbal cues required;nonverbal cues required (demo/gesture)  -DO verbal cues required;nonverbal cues required (demo/gesture);minimum assist (75% patient effort);2 person assist required  -     Transfers, Sit-Stand-Sit, Assist Device rolling walker  -DO rolling walker  -     Toilet Transfer, Early minimum assist (75% patient effort);verbal cues required;nonverbal cues required  (demo/gesture)  -DO      Toilet Transfer, Assistive Device rolling walker  -DO      Transfer, Comment Pt requires cueing for proper use of Rwx and to control descent when performing stand-sit.   -DO      Recorded by [DO] Chun Brown, PT [CH] Taylor Sarabia PT     Gait Assessment/Treatment    Gait, Rarden Level minimum assist (75% patient effort);verbal cues required;nonverbal cues required (demo/gesture)  -DO verbal cues required;nonverbal cues required (demo/gesture);minimum assist (75% patient effort)  -     Gait, Assistive Device rolling walker  -DO rolling walker  -CH     Gait, Distance (Feet) 20  -DO 40  -CH     Gait, Gait Deviations shayan decreased;forward flexed posture;step length decreased;stride length decreased;weight-shifting ability decreased  -DO shayan decreased;forward flexed posture;step length decreased;stride length decreased  -     Gait, Comment Pt only able to tolerate ambulation to the bathroom and back to bed after voiding. Pt declined further ambulation secondary to fatigue.   -DO      Recorded by [DO] Chun Brown PT [CH] Taylor Sarabia PT     Stairs Assessment/Treatment    Stairs, Rarden Level not tested  -DO      Recorded by [DO] Chun Brown PT      Motor Skills/Interventions    Additional Documentation  --  -CH     Recorded by  [CH] Taylor Sarabia PT     Positioning and Restraints    Pre-Treatment Position in bed  -DO in bed  -CH     Post Treatment Position bed  -DO bed  -CH     In Bed supine;call light within reach;encouraged to call for assist;exit alarm on;notified nsg  -DO supine;call light within reach;encouraged to call for assist;with family/caregiver;exit alarm on  -CH     Recorded by [DO] Chun Brown, PT [CH] Taylor Sarabia, PT       User Key  (r) = Recorded By, (t) = Taken By, (c) = Cosigned By    Initials Name Effective Dates     Taylor Sarabia, PT 12/01/15 -     DO Chun Brown PT 01/27/16 -                 IP PT Goals        02/07/18 0834          Bed Mobility PT LTG    Bed Mobility PT LTG, Date Established 02/07/18  -LC      Bed Mobility PT LTG, Time to Achieve 1 wk  -LC      Bed Mobility PT LTG, Activity Type all bed mobility  -LC      Bed Mobility PT LTG, Summers Level supervision required  -LC      Bed Mobility PT Goal  LTG, Assist Device bed rails  -LC      Transfer Training PT LTG    Transfer Training PT LTG, Date Established 02/07/18  -LC      Transfer Training PT LTG, Time to Achieve 1 wk  -LC      Transfer Training PT LTG, Activity Type all transfers  -LC      Transfer Training PT LTG, Summers Level supervision required  -LC      Transfer Training PT LTG, Assist Device walker, rolling  -LC      Gait Training PT LTG    Gait Training Goal PT LTG, Date Established 02/07/18  -LC      Gait Training Goal PT LTG, Time to Achieve 1 wk  -LC      Gait Training Goal PT LTG, Summers Level supervision required  -LC      Gait Training Goal PT LTG, Assist Device walker, rolling  -LC      Gait Training Goal PT LTG, Distance to Achieve 200  -LC        User Key  (r) = Recorded By, (t) = Taken By, (c) = Cosigned By    Initials Name Provider Type    BRENDAN Odom, PT DPT Physical Therapist          Physical Therapy Education     Title: PT OT SLP Therapies (Done)     Topic: Physical Therapy (Done)     Point: Mobility training (Done)    Learning Progress Summary    Learner Readiness Method Response Comment Documented by Status   Patient Acceptance E VU  DO 02/11/18 1019 Done    Acceptance E,TB,D VU,NR   02/09/18 1019 Done    Acceptance E,TB VU  SB 02/07/18 2054 Done    Acceptance E,D VU,DU safety during transfers and gait, benefits of activity  02/07/18 0834 Done               Point: Home exercise program (Done)    Learning Progress Summary    Learner Readiness Method Response Comment Documented by Status   Patient Acceptance E VU  DO 02/11/18 1019 Done    Acceptance E,TB VU  SB 02/07/18 2054 Done               Point: Body  mechanics (Done)    Learning Progress Summary    Learner Readiness Method Response Comment Documented by Status   Patient Acceptance E VU  DO 02/11/18 1019 Done    Acceptance E,TB,D VU,NR  CH 02/09/18 1019 Done               Point: Precautions (Done)    Learning Progress Summary    Learner Readiness Method Response Comment Documented by Status   Patient Acceptance E VU  DO 02/11/18 1019 Done    Acceptance E,TB,D VU,NR  CH 02/09/18 1019 Done                      User Key     Initials Effective Dates Name Provider Type Discipline     12/01/15 -  Taylor Sarabia, PT Physical Therapist PT    DO 01/27/16 -  Chun Brown, PT Physical Therapist PT    SB 06/16/16 -  Leah Brice, RN Registered Nurse Nurse     08/02/16 -  Abhay Odom, PT DPT Physical Therapist PT                    PT Recommendation and Plan  Anticipated Discharge Disposition: skilled nursing facility  PT Frequency: daily  Plan of Care Review  Plan Of Care Reviewed With: patient  Outcome Summary/Follow up Plan: Pt limited in his ambulation distance today secondary to fatigue. He continues to require MinAx1 and the use of a RWx during transfers and ambulation. Pt will benefit from continuing skilled PT for further improvements in his independence with functional mobility.           Outcome Measures       02/11/18 1000 02/09/18 1000       How much help from another person do you currently need...    Turning from your back to your side while in flat bed without using bedrails? 3  -DO 3  -CH     Moving from lying on back to sitting on the side of a flat bed without bedrails? 3  -DO 3  -CH     Moving to and from a bed to a chair (including a wheelchair)? 3  -DO 3  -CH     Standing up from a chair using your arms (e.g., wheelchair, bedside chair)? 3  -DO 3  -CH     Climbing 3-5 steps with a railing? 2  -DO 2  -CH     To walk in hospital room? 3  -DO 3  -CH     AM-PAC 6 Clicks Score 17  -DO 17  -CH     Functional Assessment    Outcome Measure Options  AM-PAC 6 Clicks Basic Mobility (PT)  -DO AM-PAC 6 Clicks Basic Mobility (PT)  -CH       User Key  (r) = Recorded By, (t) = Taken By, (c) = Cosigned By    Initials Name Provider Type    SE Sarabia, PT Physical Therapist    DO Chun Brown PT Physical Therapist           Time Calculation:         PT Charges       02/11/18 1021          Time Calculation    Start Time 0950  -DO      Stop Time 1009  -DO      Time Calculation (min) 19 min  -DO      PT Received On 02/11/18  -DO      PT - Next Appointment 02/12/18  -DO        User Key  (r) = Recorded By, (t) = Taken By, (c) = Cosigned By    Initials Name Provider Type    DO Chun Brown PT Physical Therapist          Therapy Charges for Today     Code Description Service Date Service Provider Modifiers Qty    84194774892 HC PT THER PROC EA 15 MIN 2/11/2018 Chun Brown PT GP 1          PT G-Codes  Outcome Measure Options: AM-PAC 6 Clicks Basic Mobility (PT)    Chun Brown PT  2/11/2018

## 2018-02-11 NOTE — PROGRESS NOTES
Subjective except chronic foot pain has no c/o denies cp/sob   Chief complaint:esrd         Objective:nad      Vital Signs  Temp:  [97.6 °F (36.4 °C)-99 °F (37.2 °C)] 98.5 °F (36.9 °C)  Heart Rate:  [64-67] 64  Resp:  [18] 18  BP: (141-154)/(80-85) 141/80        Intake/Output Summary (Last 24 hours) at 02/11/18 1718  Last data filed at 02/11/18 0342   Gross per 24 hour   Intake                0 ml   Output              550 ml   Net             -550 ml           Physical Exam    Constitutional : well developed ,No acute distress  ENMT lips pink oral mucosa moist   Eyes sclerae white PERRL  Respiratory: Clear to auscultation , No crackles no wheezing respirations are even and un-labored  GI: Soft, Non tender, BS active in all 4 quadrants  CVS: S1 S2 regular, no rub murmur or gallops  Skin warm dry no rashes no petechiae multiple wounds heel right below knee right   Neuro grossly nonfocal cranial nerves 2-12 grossly intact sensation intact  Ext: b/l LE edema, Peripheral pulses intact   Heme no cervical  lymphadenopathy no petechiae  RIJ TDC no signs of inflammation         Results Review:      Lab Results   Component Value Date    GLUCOSE 65 02/11/2018    CALCIUM 8.1 (L) 02/11/2018     02/11/2018    K 4.4 02/11/2018    CO2 26.2 02/11/2018     02/11/2018    BUN 34 (H) 02/11/2018    CREATININE 3.11 (H) 02/11/2018    EGFRIFNONA 20 (L) 02/11/2018    BCR 10.9 02/11/2018    ANIONGAP 9.8 02/11/2018       Lab Results   Component Value Date    WBC 8.37 02/11/2018    HGB 9.3 (L) 02/11/2018    HCT 31.9 (L) 02/11/2018    MCV 94.4 02/11/2018     02/11/2018       Pertinent Imaging studies were reviewed      Medication Review:       Current Facility-Administered Medications:   •  acetaminophen (TYLENOL) tablet 650 mg, 650 mg, Oral, Q6H PRN, Hank Joaquin MD  •  amLODIPine (NORVASC) tablet 10 mg, 10 mg, Oral, Q24H, Hank Joaquin MD, 10 mg at 02/11/18 0895  •  arformoterol (BROVANA) nebulizer solution  15 mcg, 15 mcg, Nebulization, BID - RT, Tony Mccauley MD, 15 mcg at 02/09/18 0856  •  aspirin chewable tablet 81 mg, 81 mg, Oral, Daily, Hank Joaquin MD, 81 mg at 02/11/18 0822  •  budesonide (PULMICORT) nebulizer solution 0.25 mg, 0.25 mg, Nebulization, BID - RT, Tony Mccauley MD, 0.25 mg at 02/09/18 0856  •  bumetanide (BUMEX) tablet 2 mg, 2 mg, Oral, BID, Best Fletcher MD, 2 mg at 02/11/18 0823  •  buPROPion SR (WELLBUTRIN SR) 12 hr tablet 200 mg, 200 mg, Oral, Q12H, Hank Joaquin MD, 200 mg at 02/11/18 0822  •  ceFAZolin in dextrose (ANCEF) IVPB solution 2 g, 2 g, Intravenous, Once, Nitin Coronel MD  •  cholecalciferol (VITAMIN D3) tablet 1,000 Units, 1,000 Units, Oral, Daily, Hank Joaquin MD, 1,000 Units at 02/11/18 0823  •  citalopram (CeleXA) tablet 20 mg, 20 mg, Oral, Daily, Hank Joaquin MD, 20 mg at 02/11/18 0823  •  CloNIDine (CATAPRES) tablet 0.2 mg, 0.2 mg, Oral, Q12H, Hank Joaquin MD, 0.2 mg at 02/11/18 0823  •  dextrose (D50W) solution 25 g, 25 g, Intravenous, Q15 Min PRN, Danilo Del Valle MD  •  dextrose (GLUTOSE) oral gel 15 g, 15 g, Oral, Q15 Min PRN, Danilo Del Valle MD  •  epoetin chloe (EPOGEN,PROCRIT) injection 20,000 Units, 20,000 Units, Subcutaneous, Weekly, Hank Joaquin MD, 20,000 Units at 02/06/18 1844  •  gabapentin (NEURONTIN) capsule 300 mg, 300 mg, Oral, Q12H, Hank Joaquin MD, 300 mg at 02/11/18 0822  •  glucagon (human recombinant) (GLUCAGEN DIAGNOSTIC) injection 1 mg, 1 mg, Subcutaneous, PRN, Danilo Del Valle MD  •  heparin (porcine) 5000 UNIT/ML injection 5,000 Units, 5,000 Units, Subcutaneous, Q12H, Best Fletcher MD, 5,000 Units at 02/11/18 0823  •  heparin (porcine) injection 3,000 Units, 3,000 Units, Intracatheter, Once, Tutu Smith MD  •  hydrALAZINE (APRESOLINE) tablet 100 mg, 100 mg, Oral, Q8H, Best Fletcher MD, 100 mg at 02/11/18 0540  •  hydrocerin (EUCERIN) cream, , Topical, Daily,  He Strange MD  •  HYDROcodone-acetaminophen (NORCO) 5-325 MG per tablet 1 tablet, 1 tablet, Oral, Q4H PRN, Hank Joaquin MD, 1 tablet at 02/11/18 1318  •  insulin aspart (novoLOG) injection 0-9 Units, 0-9 Units, Subcutaneous, 4x Daily With Meals & Nightly, Danilo Del Valle MD, 4 Units at 02/10/18 1737  •  insulin detemir (LEVEMIR) injection 28 Units, 28 Units, Subcutaneous, Nightly, Shruti Porras MD, 28 Units at 02/10/18 2153  •  ipratropium-albuterol (DUO-NEB) nebulizer solution 3 mL, 3 mL, Nebulization, Q6H While Awake - RT, He Strange MD, 3 mL at 02/09/18 1501  •  metoprolol tartrate (LOPRESSOR) tablet 50 mg, 50 mg, Oral, Q12H, Hank Joaquin MD, 50 mg at 02/11/18 0823  •  pantoprazole (PROTONIX) EC tablet 40 mg, 40 mg, Oral, BID AC, Danilo Hensley MD, 40 mg at 02/11/18 0822  •  risperiDONE (risperDAL) tablet 2 mg, 2 mg, Oral, Nightly, Hank Joaquin MD, 2 mg at 02/10/18 2153  •  sodium bicarbonate tablet 650 mg, 650 mg, Oral, TID, Hank Joaquin MD, 650 mg at 02/11/18 0823  •  tamsulosin (FLOMAX) 24 hr capsule 0.4 mg, 0.4 mg, Oral, Nightly, Hank Joaquin MD, 0.4 mg at 02/10/18 2152  •  traMADol (ULTRAM) tablet 50 mg, 50 mg, Oral, Q6H PRN, Gage Saavedra Jr., MD  •  traZODone (DESYREL) tablet 100 mg, 100 mg, Oral, Nightly, Hank Joaquin MD, 100 mg at 02/10/18 2152       Assesment  End-stage renal disease, initiated on hemodialysis  Anemia of chronic disease with iron deficiency  Lower Extremity edema, better with dialysis/ultrafiltration  Diabetes mellitus  Multiple skin ulcers, no antibiotics or vascular intervention needed per consultants  Decubitus ulcers  Deconditioning, physical therapy following  COPD  Hypertension  Tobacco abuse  Peripheral vascular disease  Erosive esophagitis          Plan:  Appreciate all consultants input  Check bp outliers manually by RN  Add ARB if still high bp  Continue diuretic still edema but  sanjuana  Continue TIW HD  Awaiting precert for rehab  chair in [facility] HD unit starting 2/14/2018  Likely d/c tomorrow after HD if ok with all consultants  D/w patient        Best Fletcher MD  02/11/18  5:18 PM  Tel: [phone]  Fax: [phone]

## 2018-02-11 NOTE — PROGRESS NOTES
[facility]  Phone: [phone]  Dimitri Davila MD    Subjective:  LOS: 6    Has COPD but does not use O2 or inhalers at home.    Objective   Vital Signs past 24hrs  BP range: BP: (140-154)/(80-85) 154/82  Pulse range: Heart Rate:  [66-67] 66  Resp rate range: Resp:  [18] 18  Temp range: Temp (24hrs), Av.1 °F (36.7 °C), Min:97.6 °F (36.4 °C), Max:99 °F (37.2 °C)    O2 Device: nasal cannulaFlow (L/min):  [3-4] 3  Oxygen range:SpO2:  [90 %-96 %] 96 %   83.7 kg (184 lb 8.4 oz); Body mass index is 25.02 kg/(m^2).    Intake/Output Summary (Last 24 hours) at 18 1402  Last data filed at 18 0342   Gross per 24 hour   Intake                0 ml   Output              550 ml   Net             -550 ml       Physical Exam   Cardiovascular: Normal rate and regular rhythm.    No murmur heard.  Pulmonary/Chest: He has decreased breath sounds. He has no wheezes. He has rales in the right lower field and the left lower field.   Abdominal: Soft. Bowel sounds are normal. There is no tenderness.   Musculoskeletal: He exhibits edema.   LE cellulitis   Neurological: He is alert.   Nursing note and vitals reviewed.    Results Review:    I have reviewed the laboratory and imaging data since the last note by LPC physician.  My annotations are noted in assessment and plan.    Medication Review:  I have reviewed the current MAR.  My annotations are noted in assessment and plan.       Plan   PCCM Problems  AHRF  Acute fluid overload  Bilateral pleural effusions  MING on CPAP  COPD without exac  Relevant Medical Diagnoses  ESRD  DM2  Cellulitis    Plan of Treatment  Note HD on Monday. Will check walkox after HD to see if he can come off O2.    Not on rx for COPD so presumably mild.    Continue home CPAP.    Manage fluid overload with ESRD per renal.    No acute LE infection.    Dimitri Davila MD  18  2:02 PM    Part of this note may be an electronic transcription/translation of spoken language to printed text using  the Dragon Dictation System.

## 2018-02-11 NOTE — PLAN OF CARE
Problem: Patient Care Overview (Adult)  Goal: Plan of Care Review  Outcome: Ongoing (interventions implemented as appropriate)   02/11/18 0424   Coping/Psychosocial Response Interventions   Plan Of Care Reviewed With patient   Patient Care Overview   Progress no change   Outcome Evaluation   Outcome Summary/Follow up Plan Patient request pain meds every 4 hours for his foot pain, drsg CDI, needed extra 02 after bipap put on last night, calderon removed on 02/10, voiding without difficulty, will continue to monitor.     Goal: Adult Individualization and Mutuality  Outcome: Ongoing (interventions implemented as appropriate)    Goal: Discharge Needs Assessment  Outcome: Ongoing (interventions implemented as appropriate)      Problem: Fall Risk (Adult)  Goal: Absence of Falls  Outcome: Ongoing (interventions implemented as appropriate)      Problem: Renal Failure/Kidney Injury, Acute (Adult)  Goal: Signs and Symptoms of Listed Potential Problems Will be Absent or Manageable (Renal Failure/Kidney Injury, Acute)  Outcome: Ongoing (interventions implemented as appropriate)      Problem: Diabetes, Type 2 (Adult)  Goal: Signs and Symptoms of Listed Potential Problems Will be Absent or Manageable (Diabetes, Type 2)  Outcome: Ongoing (interventions implemented as appropriate)      Problem: Pressure Ulcer (Adult)  Goal: Signs and Symptoms of Listed Potential Problems Will be Absent or Manageable (Pressure Ulcer)  Outcome: Ongoing (interventions implemented as appropriate)

## 2018-02-11 NOTE — PROGRESS NOTES
"     LOS: 5 days   Primary Care Physician: Alicia Harley MD     Subjective   Feels okay.  Sugars were low this morning.  Was not on oxygen at home.    Vital Signs  Body mass index is 25.02 kg/(m^2).  Temp:  [97 °F (36.1 °C)-98.8 °F (37.1 °C)] 97.6 °F (36.4 °C)  Heart Rate:  [58-67] 67  Resp:  [16-18] 18  BP: (139-157)/(67-86) 152/85      Objective:  General Appearance:  In no acute distress (Obese.  Looks older than age).    Vital signs: (most recent): Blood pressure 152/85, pulse 67, temperature 97.6 °F (36.4 °C), temperature source Oral, resp. rate 18, height 182.9 cm (72.01\"), weight 83.7 kg (184 lb 8.4 oz), SpO2 93 %.    Lungs:  There are wheezes and decreased breath sounds.  No rales or rhonchi.  (Occasional expiratory wheeze)  Heart: Normal rate.  Regular rhythm.  No murmur.   Abdomen: Abdomen is soft and non-distended.  Bowel sounds are normal.   There is no abdominal tenderness.   There is no splenomegaly. There is no hepatomegaly.   Extremities: There is dependent edema.  (Trace edema right ankle.  Left foot and ankle bandaged)  Neurological: Patient is alert.          Results Review:    I reviewed the patient's new clinical results.      Results from last 7 days  Lab Units 02/10/18  0622 02/09/18  0509   WBC 10*3/mm3 4.97 5.01   HEMOGLOBIN g/dL 7.8* 8.0*   PLATELETS 10*3/mm3 242 239       Results from last 7 days  Lab Units 02/10/18  0622 02/09/18  0509   SODIUM mmol/L 138 139   POTASSIUM mmol/L 4.1 3.8   CHLORIDE mmol/L 101 102   CO2 mmol/L 29.2* 29.2*   BUN mg/dL 24* 18   CREATININE mg/dL 2.80* 2.01*   CALCIUM mg/dL 8.1* 8.2*   GLUCOSE mg/dL 51* 88         Hemoglobin A1C:  Lab Results   Component Value Date    HGBA1C 10.60 (H) 02/06/2018       Glucose Range:  Glucose   Date/Time Value Ref Range Status   02/10/2018 1554 201 (H) 70 - 130 mg/dL Final   02/10/2018 1146 262 (H) 70 - 130 mg/dL Final   02/10/2018 0851 135 (H) 70 - 130 mg/dL Final   02/10/2018 0823 67 (L) 70 - 130 mg/dL Final "   02/10/2018 0733 51 (L) 70 - 130 mg/dL Final   02/09/2018 2158 167 (H) 70 - 130 mg/dL Final       Lab Results   Component Value Date    [id number] 587 01/23/2018       No results found for: TSH    Assessment & Plan      Medication Review: Yes    Active Hospital Problems (** Indicates Principal Problem)    Diagnosis Date Noted   • Acute renal failure (ARF) [N17.9] 02/06/2018      Resolved Hospital Problems    Diagnosis Date Noted Date Resolved   No resolved problems to display.       Assessment/Plan  1.  Diabetes mellitus type 2 with hypoglycemia early this morning.  Sugars since then have been elevated secondary to treatment most likely.  I decreased Levemir at hs. continue sliding scale.  Readjust dosage tomorrow as needed  2.  Iron deficiency anemia and anemia of chronic kidney disease.  Hemoglobin is stable.  He was getting IV iron earlier this afternoon.  Recheck lab in a.m.  3.  CKD4 with progression to end-stage renal disease.  Getting hemodialysis.  4.  Acute hypoxic respiratory failure with history of sleep apnea.  Improving.  Oximetry tomorrow per pulmonary.  5.  Bilateral lower extremity edema with wounds.  Dr. Gutierrez managing.  Not on antibiotics at this time.  No evidence of any systemic infection    Shruti Porras MD  02/10/18  7:13 PM

## 2018-02-11 NOTE — PROGRESS NOTES
Infectious Diseases Progress Note    Bettie Gutierrez MD     [facility]  [place]: 5 days  Patient Identification:  Name: Kevin Mckeon V  Age: 67 y.o.  Sex: male  :  1950  MRN: [id number]         Primary Care Physician: Alicia Harley MD            Subjective: No new complaints feels better breathing better denies any pain and discomfort in the lower extremities.  Interval History: See initial consultation note.    Objective:    Scheduled Meds:    amLODIPine 10 mg Oral Q24H   arformoterol 15 mcg Nebulization BID - RT   aspirin 81 mg Oral Daily   budesonide 0.25 mg Nebulization BID - RT   bumetanide 2 mg Oral BID   buPROPion  mg Oral Q12H   ceFAZolin 2 g Intravenous Once   cholecalciferol 1,000 Units Oral Daily   citalopram 20 mg Oral Daily   CloNIDine 0.2 mg Oral Q12H   epoetin chloe 20,000 Units Subcutaneous Weekly   gabapentin 300 mg Oral Q12H   heparin (porcine) 5,000 Units Subcutaneous Q12H   heparin (porcine) 3,000 Units Intracatheter Once   hydrALAZINE 100 mg Oral Q8H   hydrocerin  Topical Daily   insulin aspart 0-9 Units Subcutaneous 4x Daily With Meals & Nightly   insulin detemir 28 Units Subcutaneous Nightly   ipratropium-albuterol 3 mL Nebulization Q6H While Awake - RT   iron sucrose 200 mg Intravenous Q24H   metoprolol tartrate 50 mg Oral Q12H   pantoprazole 40 mg Oral BID AC   risperiDONE 2 mg Oral Nightly   sodium bicarbonate 650 mg Oral TID   tamsulosin 0.4 mg Oral Nightly   traZODone 100 mg Oral Nightly     Continuous Infusions:     Vital signs in last 24 hours:  Temp:  [97 °F (36.1 °C)-98.8 °F (37.1 °C)] 97.6 °F (36.4 °C)  Heart Rate:  [58-67] 67  Resp:  [16-18] 18  BP: (139-157)/(67-86) 152/85    Intake/Output:    Intake/Output Summary (Last 24 hours) at 02/10/18 2222  Last data filed at 02/10/18 2205   Gross per 24 hour   Intake              460 ml   Output             3950 ml   Net            -3490 ml       Exam:  /85 (BP Location: Left arm, Patient Position:  "Lying)  Pulse 67  Temp 97.6 °F (36.4 °C) (Oral)   Resp 18  Ht 182.9 cm (72.01\")  Wt 83.7 kg (184 lb 8.4 oz)  SpO2 93%  BMI 25.02 kg/m2    General Appearance:    Alert, cooperative, no distress, AAOx3                          Head:    Normocephalic, without obvious abnormality, atraumatic                           Eyes:    PERRL, conjunctiva/corneas clear, EOM's intact, both eyes                         Throat:   Lips, tongue, gums normal; oral mucosa pink and moist                           Neck:   Supple, symmetrical, trachea midline, no JVD                         Lungs:    Clear to auscultation bilaterally, respirations unlabored                 Chest Wall:    No tenderness or deformity, right IJ tunnel catheter in place                          Heart:    Regular rate and rhythm, S1 and S2 normal, no murmur,no  Rub                                      or gallop                  Abdomen:     Soft, non-tender, bowel sounds active, no masses, no                                                        organomegaly                  Extremities:   Right lower extremity exam unchanged with slight improvement in the erythema.  Left ankle is dressed.                          Data Review:    I reviewed the patient's new clinical results.    Results from last 7 days  Lab Units 02/10/18  0622 02/09/18  0509 02/08/18  0514 02/07/18  0558 02/06/18  2212 02/06/18  1402 02/06/18  0440   WBC 10*3/mm3 4.97 5.01 5.73  --   --   --  7.48   HEMOGLOBIN g/dL 7.8* 8.0* 8.0* 8.2* 8.2* 6.8* 6.9*   PLATELETS 10*3/mm3 242 239 239  --   --   --  266       Results from last 7 days  Lab Units 02/10/18  0622 02/09/18  0509 02/08/18  0514 02/07/18  0558 02/06/18  0440   SODIUM mmol/L 138 139 138 139 135*   POTASSIUM mmol/L 4.1 3.8 3.9 3.9 4.9   CHLORIDE mmol/L 101 102 102 102 100   CO2 mmol/L 29.2* 29.2* 27.5 24.3 22.6   BUN mg/dL 24* 18 36* 29* 63*   CREATININE mg/dL 2.80* 2.01* 2.78* 2.40* 3.85*   CALCIUM mg/dL 8.1* 8.2* 8.0* 8.0* 8.3* "   GLUCOSE mg/dL 51* 88 111* 98 258*     Microbiology Results (last 10 days)     Procedure Component Value - Date/Time    Respiratory Panel, PCR - Swab, Nasopharynx [[id number]]  (Normal) Collected:  02/06/18 1337    Lab Status:  Final result Specimen:  Swab from Nasopharynx Updated:  02/06/18 1636     ADENOVIRUS, PCR Not Detected     Coronavirus 229E Not Detected     Coronavirus HKU1 Not Detected     Coronavirus NL63 Not Detected     Coronavirus OC43 Not Detected     Human Metapneumovirus Not Detected     Human Rhinovirus/Enterovirus Not Detected     Influenza B PCR Not Detected     Parainfluenza Virus 1 Not Detected     Parainfluenza Virus 2 Not Detected     Parainfluenza Virus 3 Not Detected     Parainfluenza Virus 4 Not Detected     Bordetella pertussis pcr Not Detected     Influenza A H1N1 2009 PCR Not Detected     Chlamydophila pneumoniae PCR Not Detected     Mycoplasma pneumo by PCR Not Detected     Influenza A PCR Not Detected     Influenza A H3 Not Detected     Influenza A H1 Not Detected     RSV, PCR Not Detected            Assessment:  1-bilateral lower extremity chronic edema with stasis changes and immobility related wound with no obvious active infectionAt this time but low threshold to recommend systemic antibiotic therapy if he shows leukocytosis fever or change in mental status.  2-right heel decubitus changes with wound with no surrounding cellulitis this time but did risk of infection going forward for which she needs to be monitored.  3-anemia with progressive drop in hemoglobin likely due to GI loss.  4-hypercapnic hypoxemic respiratory failure on BiPAP  5-acute on chronic renal failure  6-underlying diabetes, hypertension, neuropathy  7-history of MRSA colonization.  Plan:  Continue to observe off of antibiotic therapy and periodic reassessments.    Bettie Gutierrez MD  2/10/2018  10:22 PM    Much of this encounter note is an electronic transcription/translation of spoken language to printed text.  The electronic translation of spoken language may permit erroneous, or at times, nonsensical words or phrases to be inadvertently transcribed; Although I have reviewed the note for such errors, some may still exist

## 2018-02-12 ENCOUNTER — APPOINTMENT (OUTPATIENT)
Dept: GENERAL RADIOLOGY | Facility: HOSPITAL | Age: 68
End: 2018-02-12

## 2018-02-12 ENCOUNTER — APPOINTMENT (OUTPATIENT)
Dept: GENERAL RADIOLOGY | Facility: HOSPITAL | Age: 68
End: 2018-02-12
Attending: INTERNAL MEDICINE

## 2018-02-12 PROBLEM — I48.0 PAF (PAROXYSMAL ATRIAL FIBRILLATION) (HCC): Status: ACTIVE | Noted: 2018-02-12

## 2018-02-12 LAB
ALBUMIN SERPL-MCNC: 2.4 G/DL (ref 3.5–5.2)
ANION GAP SERPL CALCULATED.3IONS-SCNC: 12.1 MMOL/L
BUN BLD-MCNC: 42 MG/DL (ref 8–23)
BUN/CREAT SERPL: 13.4 (ref 7–25)
CALCIUM SPEC-SCNC: 7.9 MG/DL (ref 8.6–10.5)
CHLORIDE SERPL-SCNC: 99 MMOL/L (ref 98–107)
CO2 SERPL-SCNC: 25.9 MMOL/L (ref 22–29)
CREAT BLD-MCNC: 3.13 MG/DL (ref 0.76–1.27)
DEPRECATED RDW RBC AUTO: 54.1 FL (ref 37–54)
ERYTHROCYTE [DISTWIDTH] IN BLOOD BY AUTOMATED COUNT: 15.9 % (ref 11.5–14.5)
GFR SERPL CREATININE-BSD FRML MDRD: 20 ML/MIN/1.73
GLUCOSE BLD-MCNC: 166 MG/DL (ref 65–99)
GLUCOSE BLDC GLUCOMTR-MCNC: 126 MG/DL (ref 70–130)
GLUCOSE BLDC GLUCOMTR-MCNC: 148 MG/DL (ref 70–130)
GLUCOSE BLDC GLUCOMTR-MCNC: 170 MG/DL (ref 70–130)
HCT VFR BLD AUTO: 30.8 % (ref 40.4–52.2)
HGB BLD-MCNC: 9 G/DL (ref 13.7–17.6)
MCH RBC QN AUTO: 27.9 PG (ref 27–32.7)
MCHC RBC AUTO-ENTMCNC: 29.2 G/DL (ref 32.6–36.4)
MCV RBC AUTO: 95.4 FL (ref 79.8–96.2)
PHOSPHATE SERPL-MCNC: 3.1 MG/DL (ref 2.5–4.5)
PLATELET # BLD AUTO: 232 10*3/MM3 (ref 140–500)
PMV BLD AUTO: 10.8 FL (ref 6–12)
POTASSIUM BLD-SCNC: 4.4 MMOL/L (ref 3.5–5.2)
RBC # BLD AUTO: 3.23 10*6/MM3 (ref 4.6–6)
SODIUM BLD-SCNC: 137 MMOL/L (ref 136–145)
TSH SERPL DL<=0.05 MIU/L-ACNC: 4.13 MIU/ML (ref 0.27–4.2)
WBC NRBC COR # BLD: 5.81 10*3/MM3 (ref 4.5–10.7)

## 2018-02-12 PROCEDURE — 85027 COMPLETE CBC AUTOMATED: CPT | Performed by: INTERNAL MEDICINE

## 2018-02-12 PROCEDURE — 74018 RADEX ABDOMEN 1 VIEW: CPT

## 2018-02-12 PROCEDURE — 63710000001 INSULIN DETEMER PER 5 UNITS: Performed by: INTERNAL MEDICINE

## 2018-02-12 PROCEDURE — 25010000002 HEPARIN (PORCINE) PER 1000 UNITS: Performed by: INTERNAL MEDICINE

## 2018-02-12 PROCEDURE — 97110 THERAPEUTIC EXERCISES: CPT

## 2018-02-12 PROCEDURE — 82962 GLUCOSE BLOOD TEST: CPT

## 2018-02-12 PROCEDURE — 94799 UNLISTED PULMONARY SVC/PX: CPT

## 2018-02-12 PROCEDURE — 80069 RENAL FUNCTION PANEL: CPT | Performed by: INTERNAL MEDICINE

## 2018-02-12 PROCEDURE — 93005 ELECTROCARDIOGRAM TRACING: CPT | Performed by: INTERNAL MEDICINE

## 2018-02-12 PROCEDURE — 63710000001 INSULIN ASPART PER 5 UNITS: Performed by: HOSPITALIST

## 2018-02-12 PROCEDURE — 99222 1ST HOSP IP/OBS MODERATE 55: CPT | Performed by: INTERNAL MEDICINE

## 2018-02-12 PROCEDURE — 84443 ASSAY THYROID STIM HORMONE: CPT | Performed by: INTERNAL MEDICINE

## 2018-02-12 PROCEDURE — 93010 ELECTROCARDIOGRAM REPORT: CPT | Performed by: INTERNAL MEDICINE

## 2018-02-12 PROCEDURE — 94660 CPAP INITIATION&MGMT: CPT

## 2018-02-12 RX ADMIN — GABAPENTIN 300 MG: 300 CAPSULE ORAL at 08:02

## 2018-02-12 RX ADMIN — TRAZODONE HYDROCHLORIDE 100 MG: 100 TABLET, FILM COATED ORAL at 21:50

## 2018-02-12 RX ADMIN — BUMETANIDE 2 MG: 2 TABLET ORAL at 08:02

## 2018-02-12 RX ADMIN — HYDRALAZINE HYDROCHLORIDE 100 MG: 50 TABLET, FILM COATED ORAL at 06:11

## 2018-02-12 RX ADMIN — SODIUM BICARBONATE 650 MG: 650 TABLET ORAL at 21:50

## 2018-02-12 RX ADMIN — PANTOPRAZOLE SODIUM 40 MG: 40 TABLET, DELAYED RELEASE ORAL at 22:28

## 2018-02-12 RX ADMIN — HYDROCODONE BITARTRATE AND ACETAMINOPHEN 1 TABLET: 5; 325 TABLET ORAL at 08:02

## 2018-02-12 RX ADMIN — CLONIDINE HYDROCHLORIDE 0.2 MG: 0.1 TABLET ORAL at 08:02

## 2018-02-12 RX ADMIN — INSULIN DETEMIR 10 UNITS: 100 INJECTION, SOLUTION SUBCUTANEOUS at 08:00

## 2018-02-12 RX ADMIN — TAMSULOSIN HYDROCHLORIDE 0.4 MG: 0.4 CAPSULE ORAL at 21:50

## 2018-02-12 RX ADMIN — HYDRALAZINE HYDROCHLORIDE 100 MG: 50 TABLET, FILM COATED ORAL at 13:12

## 2018-02-12 RX ADMIN — BUPROPION HYDROCHLORIDE 200 MG: 100 TABLET, EXTENDED RELEASE ORAL at 08:02

## 2018-02-12 RX ADMIN — VITAMIN D, TAB 1000IU (100/BT) 1000 UNITS: 25 TAB at 08:02

## 2018-02-12 RX ADMIN — HYDROCODONE BITARTRATE AND ACETAMINOPHEN 1 TABLET: 5; 325 TABLET ORAL at 21:44

## 2018-02-12 RX ADMIN — PANTOPRAZOLE SODIUM 40 MG: 40 TABLET, DELAYED RELEASE ORAL at 06:57

## 2018-02-12 RX ADMIN — ASPIRIN 81 MG: 81 TABLET, CHEWABLE ORAL at 08:02

## 2018-02-12 RX ADMIN — TRAMADOL HYDROCHLORIDE 50 MG: 50 TABLET, FILM COATED ORAL at 13:16

## 2018-02-12 RX ADMIN — BUPROPION HYDROCHLORIDE 200 MG: 100 TABLET, EXTENDED RELEASE ORAL at 21:50

## 2018-02-12 RX ADMIN — Medication 1 APPLICATION: at 08:07

## 2018-02-12 RX ADMIN — GABAPENTIN 300 MG: 300 CAPSULE ORAL at 21:50

## 2018-02-12 RX ADMIN — BUMETANIDE 3 MG: 2 TABLET ORAL at 21:47

## 2018-02-12 RX ADMIN — HYDROCODONE BITARTRATE AND ACETAMINOPHEN 1 TABLET: 5; 325 TABLET ORAL at 14:09

## 2018-02-12 RX ADMIN — HYDRALAZINE HYDROCHLORIDE 100 MG: 50 TABLET, FILM COATED ORAL at 23:57

## 2018-02-12 RX ADMIN — SODIUM BICARBONATE 650 MG: 650 TABLET ORAL at 08:02

## 2018-02-12 RX ADMIN — METOPROLOL TARTRATE 50 MG: 50 TABLET, FILM COATED ORAL at 21:50

## 2018-02-12 RX ADMIN — CLONIDINE HYDROCHLORIDE 0.2 MG: 0.1 TABLET ORAL at 21:50

## 2018-02-12 RX ADMIN — ARFORMOTEROL TARTRATE 15 MCG: 15 SOLUTION RESPIRATORY (INHALATION) at 22:56

## 2018-02-12 RX ADMIN — RISPERIDONE 2 MG: 1 TABLET ORAL at 21:47

## 2018-02-12 RX ADMIN — HEPARIN SODIUM 5000 UNITS: 5000 INJECTION, SOLUTION INTRAVENOUS; SUBCUTANEOUS at 21:47

## 2018-02-12 RX ADMIN — TRAMADOL HYDROCHLORIDE 50 MG: 50 TABLET, FILM COATED ORAL at 04:57

## 2018-02-12 RX ADMIN — INSULIN ASPART 2 UNITS: 100 INJECTION, SOLUTION INTRAVENOUS; SUBCUTANEOUS at 08:00

## 2018-02-12 RX ADMIN — HEPARIN SODIUM 5000 UNITS: 5000 INJECTION, SOLUTION INTRAVENOUS; SUBCUTANEOUS at 08:02

## 2018-02-12 RX ADMIN — AMLODIPINE BESYLATE 10 MG: 10 TABLET ORAL at 08:02

## 2018-02-12 RX ADMIN — BUDESONIDE 0.25 MG: 0.25 INHALANT RESPIRATORY (INHALATION) at 22:56

## 2018-02-12 RX ADMIN — METOPROLOL TARTRATE 50 MG: 50 TABLET, FILM COATED ORAL at 08:02

## 2018-02-12 RX ADMIN — CITALOPRAM 20 MG: 20 TABLET, FILM COATED ORAL at 08:02

## 2018-02-12 NOTE — CONSULTS
Patient Name: Kevin Mckeon V  :1950  67 y.o.    Date of Admission: 2018  Date of Consultation:  18  Encounter Provider: Leandro Fung III, MD  Place of Service: [facility] CARDIOLOGY  Referring Provider: Hank Joaquin MD  Patient Care Team:  Alicia Harley MD as PCP - General (Family Medicine)      Chief complaint: SOB    History of Present Illness:   Mr. Kevin Mckeon is a 67 year old male patient. He has a history CKD stage 4, COPD - not on home o2, HTN and diabetes mellitus.    He was directly admitted on 18 for urgent dialysis with bilateral lower extremity edema and SOB. He was recently admitted at [facility] for DKA, MRSA and cellulitis of right heel. Labs included potassium 4.9, Cr 3.85, glucose 258, Hbg 6.9, Hct 23.1, and Ha1c 10.6 - he was transfused 1 PRBC and started on Protonix gtt. CXR showed bilateral lower lobe infiltrates - he was placed on BiPAP. Patient had lower extremity edema - Duplex BLE was normal. BLE arterial doppler was normal. On , shiley catheter was placed and dialysis was initiated. Right IJ tunnel cath was placed. Cellulitis of right heel has resolved. Patient is receiving HD TIW.     Her consult to see this patient today because of new onset atrial fibrillation with RVR.  Patient's heart rate was approximately 120.  He has been receiving metoprolol tartrate 50 mg twice daily.    This patient has no known cardiac history.  This patient has no history of coronary artery disease, congestive heart failure, rheumatic fever, rheumatic heart disease, congenital heart disease or heart murmur.  This patient has never required invasive cardiovascular evaluation.    He denies any recent cardiac complaints.This patient denies any chest pain, pressure, tightness, squeezing, or heartburn.  This patient has not experienced any feeling of palpitations, tachycardia or heart racing and no presyncope or syncope.  There has  not been any problems with dizziness or lightheadedness.  There has not been any orthopnea or PND, and no problems with lower extremity edema.  This patient denies any shortness of breath at rest or with activity and has not had any wheezing.  This patient has not had any problems with unexplained nausea or vomiting.     Previous Testing:    BLE Arterial Doppler 2/7/18  \  Duplex BLE 2/6/2018        Past Medical History:   Diagnosis Date   • COPD (chronic obstructive pulmonary disease)    • Diabetes mellitus    • Hypertension        Past Surgical History:   Procedure Laterality Date   • AMPUTATION FOOT / TOE  two years ago    5th toe on right foot   • ENDOSCOPY N/A 1/19/2018    Procedure: ESOPHAGOGASTRODUODENOSCOPY;  Surgeon: Danilo Hensley MD;  Location:  [facility];  Service:    • HERNIA REPAIR     • INSERTION HEMODIALYSIS CATHETER N/A 2/8/2018    Procedure: PALINDROME INSERTION;  Surgeon: Gage Saavedra Jr., MD;  Location:  [facility];  Service:    • LEG DEBRIDEMENT Bilateral 1/21/2018    Procedure: LOWER EXTREMITY DEBRIDEMENT 8:00;  Surgeon: Kaylyn Tillman DO;  Location:  [facility];  Service:          Prior to Admission medications    Medication Sig Start Date End Date Taking? Authorizing Provider   acetaminophen (TYLENOL) 325 MG tablet Take 650 mg by mouth Every 6 (Six) Hours As Needed for Mild Pain .   Yes Historical Provider, MD   amLODIPine (NORVASC) 10 MG tablet Take 1 tablet by mouth Daily. 1/26/18  Yes Jovan Ramirez MD   aspirin 81 MG EC tablet Take 81 mg by mouth Daily.   Yes Historical Provider, MD   buPROPion SR (WELLBUTRIN SR) 200 MG 12 hr tablet Take 1 tablet by mouth Daily. 1/26/18  Yes Jovan Ramirez MD   Cholecalciferol 1000 units capsule Take 1,000 Units by mouth Daily. 1/25/18 1/25/19 Yes Jovan Ramirez MD   citalopram (CeleXA) 20 MG tablet Take 20 mg by mouth Daily.   Yes Historical Provider, MD   CloNIDine (CATAPRES) 0.1 MG tablet Take 0.1 mg by mouth 2 (Two) Times a  Day.   Yes Historical Provider, MD   collagenase 250 UNIT/GM ointment Apply  topically Daily. 1/26/18  Yes Jovan Ramirez MD   epoetin chloe (EPOGEN,PROCRIT) 99510 UNIT/ML injection Inject 1 mL under the skin 1 (One) Time Per Week. Indications: Anemia associated with Chronic Kidney Failure 1/29/18  Yes Jovan Ramirez MD   gabapentin (NEURONTIN) 300 MG capsule Take 1 capsule by mouth Every 12 (Twelve) Hours. 1/25/18  Yes Jovan Ramirez MD   hydrALAZINE (APRESOLINE) 25 MG tablet Take 3 tablets by mouth Every 8 (Eight) Hours. 1/25/18  Yes Jovan Ramirez MD   HYDROcodone-acetaminophen (NORCO) 5-325 MG per tablet Take 1 tablet by mouth Every 4 (Four) Hours As Needed for Moderate Pain . 1/25/18  Yes Jovan Ramirez MD   hydrophor (AQUAPHOR) ointment ointment Apply  topically As Needed (Dry Skin). 1/25/18  Yes Jovan Ramirez MD   insulin detemir (LEVEMIR) 100 UNIT/ML injection Inject 25 Units under the skin Every Night. 1/25/18  Yes Jovan Ramirez MD   metoprolol tartrate (LOPRESSOR) 50 MG tablet Take 1 tablet by mouth Every 12 (Twelve) Hours. 1/25/18  Yes Jovan Ramirez MD   pantoprazole (PROTONIX) 40 MG EC tablet Take 1 tablet by mouth 2 (Two) Times a Day Before Meals. 1/25/18  Yes Jovan Ramirez MD   risperiDONE (risperDAL) 2 MG tablet Take 2 mg by mouth Daily.   Yes Historical Provider, MD   sodium bicarbonate 650 MG tablet Take 1 tablet by mouth 3 (Three) Times a Day. 1/25/18  Yes Jovan Ramirez MD   tamsulosin (FLOMAX) 0.4 MG capsule 24 hr capsule Take 1 capsule by mouth Every Night. 1/25/18  Yes Jovan Ramirez MD   traZODone (DESYREL) 100 MG tablet Take 1 tablet by mouth Every Night. 1/25/18  Yes Joavn Ramirez MD       No Known Allergies    Social History     Social History   • Marital status: Single     Spouse name: N/A   • Number of children: N/A   • Years of education: N/A     Social History Main Topics   • Smoking status: Former Smoker     Packs/day: 3.00     Years: 37.00     Types: Cigarettes     Quit date: 2005    • Smokeless tobacco: Current User     Types: Chew   • Alcohol use 1.2 oz/week     2 Glasses of wine per week      Comment: rarely   • Drug use: No   • Sexual activity: Defer     Other Topics Concern   • None     Social History Narrative       Family History   Problem Relation Age of Onset   • Heart disease Father        REVIEW OF SYSTEMS:   All systems reviewed.  Pertinent positives identified in HPI.  All other systems are negative.      Objective:     Vitals:    02/11/18 2336 02/12/18 0434 02/12/18 0611 02/12/18 0733   BP: 136/75 149/87 152/86 142/84   BP Location: Right arm Right arm  Right arm   Patient Position: Lying Lying  Lying   Pulse: 73 74 73 101   Resp: 18 18 18   Temp: 97.7 °F (36.5 °C) 99.1 °F (37.3 °C)  97.8 °F (36.6 °C)   TempSrc: Oral Oral  Oral   SpO2: 91% 97%  93%   Weight:       Height:         Body mass index is 25.02 kg/(m^2).  General Appearance:    Alert, cooperative, in no acute distress   Head:    Normocephalic, without obvious abnormality, atraumatic   Eyes:            Lids and lashes normal, conjunctivae and sclerae normal, no   icterus, no pallor, corneas clear, PERRLA   Ears:    Ears appear intact with no abnormalities noted   Throat:   No oral lesions, no thrush, oral mucosa moist   Neck:   No adenopathy, supple, trachea midline, no thyromegaly, no   carotid bruit, no JVD   Back:     No kyphosis present, no scoliosis present, no skin lesions, erythema or scars, no tenderness to percussion or palpation, range of motion normal   Lungs:     Clear to auscultation,respirations regular, even and unlabored    Heart:    irregular rhythm and normal rate, normal S1 and S2, no murmur, no gallop, no rub, no click   Chest Wall:    No abnormalities observed   Abdomen:     Normal bowel sounds, no masses, no organomegaly, soft        non-tender, non-distended, no guarding, no rebound  tenderness   Extremities:   Moves all extremities well, no edema, no cyanosis, no redness   Pulses:   Pulses  palpable and equal bilaterally. Normal radial, carotid, femoral, dorsalis pedis and posterior tibial pulses bilaterally. Normal abdominal aorta   Skin:  Psychiatric:   No bleeding, bruising or rash    Alert and oriented x 3, normal mood and affect           Results from last 7 days  Lab Units 02/12/18  0557   SODIUM mmol/L 137   POTASSIUM mmol/L 4.4   CHLORIDE mmol/L 99   CO2 mmol/L 25.9   BUN mg/dL 42*   CREATININE mg/dL 3.13*   CALCIUM mg/dL 7.9*   GLUCOSE mg/dL 166*           Results from last 7 days  Lab Units 02/12/18  0557   WBC 10*3/mm3 5.81   HEMOGLOBIN g/dL 9.0*   HEMATOCRIT % 30.8*   PLATELETS 10*3/mm3 232           Results from last 7 days  Lab Units 02/06/18  0440   MAGNESIUM mg/dL 2.1           TELE 2/12/2018 9:40 a.m.      TELE 2/12/2018 7:33 a.m.        EKG 2/12/2018      I personally viewed and interpreted the patient's EKG/Telemetry data.      Current Facility-Administered Medications:   •  acetaminophen (TYLENOL) tablet 650 mg, 650 mg, Oral, Q6H PRN, Hank Joaquin MD  •  albumin human 25 % IV SOLN 12.5 g, 12.5 g, Intravenous, PRN, Best Fletcher MD  •  amLODIPine (NORVASC) tablet 10 mg, 10 mg, Oral, Q24H, Hank Joaquin MD, 10 mg at 02/12/18 0802  •  arformoterol (BROVANA) nebulizer solution 15 mcg, 15 mcg, Nebulization, BID - RT, Tony Mccauley MD, 15 mcg at 02/09/18 0856  •  aspirin chewable tablet 81 mg, 81 mg, Oral, Daily, Hank Joaquin MD, 81 mg at 02/12/18 0802  •  budesonide (PULMICORT) nebulizer solution 0.25 mg, 0.25 mg, Nebulization, BID - RT, Tony Mccauley MD, 0.25 mg at 02/09/18 0856  •  bumetanide (BUMEX) tablet 3 mg, 3 mg, Oral, BID, Best Fletcher MD  •  buPROPion SR (WELLBUTRIN SR) 12 hr tablet 200 mg, 200 mg, Oral, Q12H, Hank Joaquin MD, 200 mg at 02/12/18 0802  •  ceFAZolin in dextrose (ANCEF) IVPB solution 2 g, 2 g, Intravenous, Once, Nitin Coronel MD  •  cholecalciferol (VITAMIN D3) tablet 1,000 Units, 1,000 Units, Oral, Daily, Hank  Raoul Joaquin MD, 1,000 Units at 02/12/18 0802  •  citalopram (CeleXA) tablet 20 mg, 20 mg, Oral, Daily, Hank Joaquin MD, 20 mg at 02/12/18 0802  •  CloNIDine (CATAPRES) tablet 0.2 mg, 0.2 mg, Oral, Q12H, Hank Joaquin MD, 0.2 mg at 02/12/18 0802  •  dextrose (D50W) solution 25 g, 25 g, Intravenous, Q15 Min PRN, Danilo Del Valle MD  •  dextrose (GLUTOSE) oral gel 15 g, 15 g, Oral, Q15 Min PRN, Danilo Del Valle MD  •  epoetin chloe (EPOGEN,PROCRIT) injection 20,000 Units, 20,000 Units, Subcutaneous, Weekly, Hank Joaquin MD, 20,000 Units at 02/06/18 1844  •  gabapentin (NEURONTIN) capsule 300 mg, 300 mg, Oral, Q12H, Hank Joaquin MD, 300 mg at 02/12/18 0802  •  glucagon (human recombinant) (GLUCAGEN DIAGNOSTIC) injection 1 mg, 1 mg, Subcutaneous, PRN, Danilo Del Valle MD  •  heparin (porcine) 5000 UNIT/ML injection 5,000 Units, 5,000 Units, Subcutaneous, Q12H, Best Fletcher MD, 5,000 Units at 02/12/18 0802  •  heparin (porcine) injection 3,000 Units, 3,000 Units, Intracatheter, Once, Tutu Smith MD  •  hydrALAZINE (APRESOLINE) tablet 100 mg, 100 mg, Oral, Q8H, Best Fletcher MD, 100 mg at 02/12/18 1312  •  hydrocerin (EUCERIN) cream, , Topical, Daily, He Strange MD, 1 application at 02/12/18 0807  •  HYDROcodone-acetaminophen (NORCO) 5-325 MG per tablet 1 tablet, 1 tablet, Oral, Q4H PRN, Hank Joaquin MD, 1 tablet at 02/12/18 0802  •  insulin aspart (novoLOG) injection 0-9 Units, 0-9 Units, Subcutaneous, 4x Daily With Meals & Nightly, Danilo Del Valle MD, 2 Units at 02/12/18 0800  •  insulin detemir (LEVEMIR) injection 10 Units, 10 Units, Subcutaneous, QAM, Shruti Porras MD, 10 Units at 02/12/18 0800  •  insulin detemir (LEVEMIR) injection 22 Units, 22 Units, Subcutaneous, Nightly, Shruti Porras MD, 22 Units at 02/11/18 7465  •  ipratropium-albuterol (DUO-NEB) nebulizer solution 3 mL, 3 mL, Nebulization, Q6H While Awake - RT, He LICEA  MD Alie, 3 mL at 02/09/18 1501  •  metoprolol tartrate (LOPRESSOR) tablet 50 mg, 50 mg, Oral, Q12H, Hank Joaquin MD, 50 mg at 02/12/18 0802  •  pantoprazole (PROTONIX) EC tablet 40 mg, 40 mg, Oral, BID AC, Danilo Hensley MD, 40 mg at 02/12/18 0657  •  risperiDONE (risperDAL) tablet 2 mg, 2 mg, Oral, Nightly, Hank Joaquin MD, 2 mg at 02/11/18 2135  •  sodium bicarbonate tablet 650 mg, 650 mg, Oral, TID, Hank Joaquin MD, 650 mg at 02/12/18 0802  •  tamsulosin (FLOMAX) 24 hr capsule 0.4 mg, 0.4 mg, Oral, Nightly, Hank Joaquin MD, 0.4 mg at 02/11/18 2134  •  traMADol (ULTRAM) tablet 50 mg, 50 mg, Oral, Q6H PRN, Gage Saavedra Jr., MD, 50 mg at 02/12/18 1316  •  traZODone (DESYREL) tablet 100 mg, 100 mg, Oral, Nightly, Hank Joaquin MD, 100 mg at 02/11/18 2134      Assessment and Plan:     Active Hospital Problems (** Indicates Principal Problem)    Diagnosis Date Noted   • **Acute renal failure (ARF) [N17.9] 02/06/2018   • PAF (paroxysmal atrial fibrillation) [I48.0] 02/12/2018   • DM (diabetes mellitus) type II controlled with renal manifestation [E11.29] 01/19/2018   • DKA (diabetic ketoacidoses) [E13.10] 01/17/2018      Resolved Hospital Problems    Diagnosis Date Noted Date Resolved   No resolved problems to display.       1.  Acute renal failure  2.  Paroxysmal atrial fibrillation-we'll utilize rate control.  Patient does meet guideline recommendations for chronic anticoagulation  3.  Diabetes mellitus with renal insufficiency  4.  An echocardiogram has been our record he ordered; this will be reviewed once available.      Leandro Fung III, MD  02/12/18  10:33 AM

## 2018-02-12 NOTE — PROGRESS NOTES
"     LOS: 7 days   Primary Care Physician: Alicia Harley MD     Subjective   I saw the patient just as he was getting ready to go to dialysis.  He had had palpitations with A. fib.  He was currently in sinus rhythm when I saw him.  His feet were hurting but were better.    Vital Signs  Body mass index is 25.02 kg/(m^2).  Temp:  [97.7 °F (36.5 °C)-99.1 °F (37.3 °C)] 98.1 °F (36.7 °C)  Heart Rate:  [] 67  Resp:  [16-18] 16  BP: (134-153)/(74-89) 134/78      Objective:  General Appearance:  In no acute distress (Looks older than age).    Vital signs: (most recent): Blood pressure 134/78, pulse 67, temperature 98.1 °F (36.7 °C), temperature source Oral, resp. rate 16, height 182.9 cm (72.01\"), weight 83.7 kg (184 lb 8.4 oz), SpO2 95 %.    Lungs:  There are decreased breath sounds.  No wheezes, rales or rhonchi.    Heart: Normal rate.  Regular rhythm.  No murmur.   Abdomen: Abdomen is soft and distended.  (Obese)Bowel sounds are normal.   There is no abdominal tenderness.   There is no splenomegaly. There is no hepatomegaly.   Extremities: There is no dependent edema.  (Feet are bandaged)  Neurological: Patient is alert.          Results Review:    I reviewed the patient's new clinical results.      Results from last 7 days  Lab Units 02/12/18  0557 02/11/18  0634   WBC 10*3/mm3 5.81 8.37   HEMOGLOBIN g/dL 9.0* 9.3*   PLATELETS 10*3/mm3 232 257       Results from last 7 days  Lab Units 02/12/18  0557 02/11/18  0634   SODIUM mmol/L 137 139   POTASSIUM mmol/L 4.4 4.4   CHLORIDE mmol/L 99 103   CO2 mmol/L 25.9 26.2   BUN mg/dL 42* 34*   CREATININE mg/dL 3.13* 3.11*   CALCIUM mg/dL 7.9* 8.1*   GLUCOSE mg/dL 166* 65         Hemoglobin A1C:  Lab Results   Component Value Date    HGBA1C 10.60 (H) 02/06/2018       Glucose Range:  Glucose   Date/Time Value Ref Range Status   02/12/2018 1134 148 (H) 70 - 130 mg/dL Final   02/12/2018 0735 170 (H) 70 - 130 mg/dL Final   02/11/2018 2023 215 (H) 70 - 130 mg/dL Final "   02/11/2018 1536 179 (H) 70 - 130 mg/dL Final   02/11/2018 1146 107 70 - 130 mg/dL Final   02/11/2018 0746 72 70 - 130 mg/dL Final       Lab Results   Component Value Date    OCQFUHXR83 587 01/23/2018       Lab Results   Component Value Date    TSH 4.130 02/12/2018       Assessment & Plan      Medication Review: Yes    Active Hospital Problems (** Indicates Principal Problem)    Diagnosis Date Noted   • **Acute renal failure (ARF) [N17.9] 02/06/2018   • PAF (paroxysmal atrial fibrillation) [I48.0] 02/12/2018   • DM (diabetes mellitus) type II controlled with renal manifestation [E11.29] 01/19/2018   • DKA (diabetic ketoacidoses) [E13.10] 01/17/2018      Resolved Hospital Problems    Diagnosis Date Noted Date Resolved   No resolved problems to display.       Assessment/Plan  1.  Diabetes mellitus type 2.  On twice a day dosing of Levemir.  Sugars are better.  Continue same with sliding scale.  2.  Iron deficiency anemia and anemia of chronic disease.  Hemoglobin about the same.  Recheck in a.m.  3.  End-stage renal disease.  Dialysis again this afternoon.  4.  Drainage from open wound right foot, and per Dr. Davila right foot cool to touch.  Gram stain and culture ordered; arterial Doppler ordered by Dr. Davila.  I discussed briefly with Dr. Gutierrez  5.  Abdominal distention.  He is nontender but his belly seems more prominent than yesterday.  KUB ordered    Shruti Porras MD  02/12/18  6:33 PM

## 2018-02-12 NOTE — PROGRESS NOTES
Infectious Diseases Progress Note    Bettie Gutierrez MD     [facility]  Los: 6 days  Patient Identification:  Name: Kevin Mckeon V  Age: 67 y.o.  Sex: male  :  1950  MRN: [id number]         Primary Care Physician: Alicia Harley MD            Subjective: Feeling better denies any specific symptoms no fever no chills.  There is no worsening in the lower extremity discomfort.  Interval History: See initial consultation note.    Objective:    Scheduled Meds:    amLODIPine 10 mg Oral Q24H   arformoterol 15 mcg Nebulization BID - RT   aspirin 81 mg Oral Daily   budesonide 0.25 mg Nebulization BID - RT   bumetanide 2 mg Oral BID   buPROPion  mg Oral Q12H   ceFAZolin 2 g Intravenous Once   cholecalciferol 1,000 Units Oral Daily   citalopram 20 mg Oral Daily   CloNIDine 0.2 mg Oral Q12H   epoetin chloe 20,000 Units Subcutaneous Weekly   gabapentin 300 mg Oral Q12H   heparin (porcine) 5,000 Units Subcutaneous Q12H   heparin (porcine) 3,000 Units Intracatheter Once   hydrALAZINE 100 mg Oral Q8H   hydrocerin  Topical Daily   insulin aspart 0-9 Units Subcutaneous 4x Daily With Meals & Nightly   [START ON 2018] insulin detemir 10 Units Subcutaneous QAM   insulin detemir 22 Units Subcutaneous Nightly   ipratropium-albuterol 3 mL Nebulization Q6H While Awake - RT   metoprolol tartrate 50 mg Oral Q12H   pantoprazole 40 mg Oral BID AC   risperiDONE 2 mg Oral Nightly   sodium bicarbonate 650 mg Oral TID   tamsulosin 0.4 mg Oral Nightly   traZODone 100 mg Oral Nightly     Continuous Infusions:     Vital signs in last 24 hours:  Temp:  [97.9 °F (36.6 °C)-99 °F (37.2 °C)] 98.4 °F (36.9 °C)  Heart Rate:  [64-72] 72  Resp:  [18] 18  BP: (135-154)/(74-85) 135/74    Intake/Output:    Intake/Output Summary (Last 24 hours) at 18  Last data filed at 18 1910   Gross per 24 hour   Intake                0 ml   Output              700 ml   Net             -700 ml       Exam:  /74 (BP  "Location: Right arm, Patient Position: Lying)  Pulse 72  Temp 98.4 °F (36.9 °C) (Oral)   Resp 18  Ht 182.9 cm (72.01\")  Wt 83.7 kg (184 lb 8.4 oz)  SpO2 98%  BMI 25.02 kg/m2    General Appearance:    Alert, cooperative, no distress, AAOx3                          Head:    Normocephalic, without obvious abnormality, atraumatic                           Eyes:    PERRL, conjunctiva/corneas clear, EOM's intact, both eyes                         Throat:   Lips, tongue, gums normal; oral mucosa pink and moist                           Neck:   Supple, symmetrical, trachea midline, no JVD                         Lungs:    Clear to auscultation bilaterally, respirations unlabored                 Chest Wall:    No tenderness or deformity, right IJ tunnel catheter in place                          Heart:    Regular rate and rhythm, S1 and S2 normal, no murmur,no  Rub                                      or gallop                  Abdomen:     Soft, non-tender, bowel sounds active, no masses, no                                                        organomegaly                  Extremities:  Left ankle is dressed decreased erythema and warmth of the bilateral lower extremitiescellulitis noted.                          Data Review:    I reviewed the patient's new clinical results.    Results from last 7 days  Lab Units 02/11/18  0634 02/10/18  0622 02/09/18  0509 02/08/18  0514 02/07/18  0558 02/06/18  2212 02/06/18  1402 02/06/18  0440   WBC 10*3/mm3 8.37 4.97 5.01 5.73  --   --   --  7.48   HEMOGLOBIN g/dL 9.3* 7.8* 8.0* 8.0* 8.2* 8.2* 6.8* 6.9*   PLATELETS 10*3/mm3 257 242 239 239  --   --   --  266       Results from last 7 days  Lab Units 02/11/18  0634 02/10/18  0622 02/09/18  0509 02/08/18  0514 02/07/18  0558 02/06/18  0440   SODIUM mmol/L 139 138 139 138 139 135*   POTASSIUM mmol/L 4.4 4.1 3.8 3.9 3.9 4.9   CHLORIDE mmol/L 103 101 102 102 102 100   CO2 mmol/L 26.2 29.2* 29.2* 27.5 24.3 22.6   BUN mg/dL 34* 24* " 18 36* 29* 63*   CREATININE mg/dL 3.11* 2.80* 2.01* 2.78* 2.40* 3.85*   CALCIUM mg/dL 8.1* 8.1* 8.2* 8.0* 8.0* 8.3*   GLUCOSE mg/dL 65 51* 88 111* 98 258*     Microbiology Results (last 10 days)     Procedure Component Value - Date/Time    Respiratory Panel, PCR - Swab, Nasopharynx [[id number]]  (Normal) Collected:  02/06/18 1337    Lab Status:  Final result Specimen:  Swab from Nasopharynx Updated:  02/06/18 1636     ADENOVIRUS, PCR Not Detected     Coronavirus 229E Not Detected     Coronavirus HKU1 Not Detected     Coronavirus NL63 Not Detected     Coronavirus OC43 Not Detected     Human Metapneumovirus Not Detected     Human Rhinovirus/Enterovirus Not Detected     Influenza B PCR Not Detected     Parainfluenza Virus 1 Not Detected     Parainfluenza Virus 2 Not Detected     Parainfluenza Virus 3 Not Detected     Parainfluenza Virus 4 Not Detected     Bordetella pertussis pcr Not Detected     Influenza A H1N1 2009 PCR Not Detected     Chlamydophila pneumoniae PCR Not Detected     Mycoplasma pneumo by PCR Not Detected     Influenza A PCR Not Detected     Influenza A H3 Not Detected     Influenza A H1 Not Detected     RSV, PCR Not Detected            Assessment:  1-bilateral lower extremity chronic edema with stasis changes and immobility related wound with no obvious active infectionAt this time but low threshold to recommend systemic antibiotic therapy if he shows leukocytosis fever or change in mental status.  2-right heel decubitus changes with wound with no surrounding cellulitis this time but did risk of infection going forward for which she needs to be monitored.  3-anemia with progressive drop in hemoglobin likely due to GI loss.  4-hypercapnic hypoxemic respiratory failure on BiPAP  5-acute on chronic renal failure  6-underlying diabetes, hypertension, neuropathy  7-history of MRSA colonization.  Medications:  Continue to observe off of antibiotic therapy with periodic reassessments.    Bettie Gutierrez,  MD  2/11/2018  8:36 PM    Much of this encounter note is an electronic transcription/translation of spoken language to printed text. The electronic translation of spoken language may permit erroneous, or at times, nonsensical words or phrases to be inadvertently transcribed; Although I have reviewed the note for such errors, some may still exist

## 2018-02-12 NOTE — PROGRESS NOTES
Continued Stay Note  [facility]     Patient Name: Kevin Mckeon V  MRN: [id number]  Today's Date: 2/12/2018    Admit Date: 2/5/2018          Discharge Plan       02/12/18 0912    Case Management/Social Work Plan    Plan [facility] has obtained Precert. Dialysis arranged at [facility] on Monday, Wednesday and Friday     Additional Comments Spoke with physician this morning . He rowley not plan to discharge patient today. Spoke with Bryanna  who states patient will go to [facility] on discharge Precert has been obtained and   Dialysis is arranged   in [place].........  LEILANI Parks              Discharge Codes     None            LEILANI Parks

## 2018-02-12 NOTE — PLAN OF CARE
Problem: Patient Care Overview (Adult)  Goal: Plan of Care Review  Outcome: Ongoing (interventions implemented as appropriate)   02/12/18 0434   Coping/Psychosocial Response Interventions   Plan Of Care Reviewed With patient   Patient Care Overview   Progress no change   Outcome Evaluation   Outcome Summary/Follow up Plan RESTING WELL BETWEEN CARE, T&R Q2HRS, VOIDED PER URINAL X1 220CC, AND INCONTINENT OF URINE X1, LARGE AMOUNT. ABLE TO VOICE NEEDS, DRESSING TO BLE INTACT, BLE ELEVATED ON PILLOW. NAD, VSS, TAKING SIPS OF FLUIDS.      Goal: Adult Individualization and Mutuality  Outcome: Ongoing (interventions implemented as appropriate)    Goal: Discharge Needs Assessment  Outcome: Ongoing (interventions implemented as appropriate)      Problem: Fall Risk (Adult)  Goal: Absence of Falls  Outcome: Ongoing (interventions implemented as appropriate)      Problem: Renal Failure/Kidney Injury, Acute (Adult)  Goal: Signs and Symptoms of Listed Potential Problems Will be Absent or Manageable (Renal Failure/Kidney Injury, Acute)  Outcome: Ongoing (interventions implemented as appropriate)      Problem: Diabetes, Type 2 (Adult)  Goal: Signs and Symptoms of Listed Potential Problems Will be Absent or Manageable (Diabetes, Type 2)  Outcome: Ongoing (interventions implemented as appropriate)      Problem: Pressure Ulcer (Adult)  Goal: Signs and Symptoms of Listed Potential Problems Will be Absent or Manageable (Pressure Ulcer)  Outcome: Ongoing (interventions implemented as appropriate)      Problem: Perioperative Period (Adult)  Goal: Signs and Symptoms of Listed Potential Problems Will be Absent or Manageable (Perioperative Period)  Outcome: Ongoing (interventions implemented as appropriate)

## 2018-02-12 NOTE — PROGRESS NOTES
Subjective except chronic foot pain has no c/o denies cp/sob   Chief complaint:esrd         Objective:nad      Vital Signs  Temp:  [97.7 °F (36.5 °C)-99.1 °F (37.3 °C)] 97.8 °F (36.6 °C)  Heart Rate:  [] 101  Resp:  [18] 18  BP: (135-153)/(74-87) 142/84        Intake/Output Summary (Last 24 hours) at 02/12/18 0833  Last data filed at 02/12/18 0832   Gross per 24 hour   Intake              210 ml   Output              570 ml   Net             -360 ml           Physical Exam    Constitutional : well developed ,No acute distress  ENMT lips pink oral mucosa moist   Eyes sclerae white PERRL  Respiratory: Clear to auscultation , No crackles no wheezing respirations are even and un-labored  GI: Soft, Non tender, BS active in all 4 quadrants  CVS: S1 S2 regular, no rub murmur or gallops  Skin warm dry no rashes no petechiae multiple wounds heel right below knee right   Neuro grossly nonfocal cranial nerves 2-12 grossly intact sensation intact  Ext: b/l LE edema, Peripheral pulses intact   Heme no cervical  lymphadenopathy no petechiae  RIJ TDC no signs of inflammation         Results Review:      Lab Results   Component Value Date    GLUCOSE 166 (H) 02/12/2018    CALCIUM 7.9 (L) 02/12/2018     02/12/2018    K 4.4 02/12/2018    CO2 25.9 02/12/2018    CL 99 02/12/2018    BUN 42 (H) 02/12/2018    CREATININE 3.13 (H) 02/12/2018    EGFRIFNONA 20 (L) 02/12/2018    BCR 13.4 02/12/2018    ANIONGAP 12.1 02/12/2018       Lab Results   Component Value Date    WBC 5.81 02/12/2018    HGB 9.0 (L) 02/12/2018    HCT 30.8 (L) 02/12/2018    MCV 95.4 02/12/2018     02/12/2018       Pertinent Imaging studies were reviewed      Medication Review:       Current Facility-Administered Medications:   •  acetaminophen (TYLENOL) tablet 650 mg, 650 mg, Oral, Q6H PRN, Hank Joaquin MD  •  albumin human 25 % IV SOLN 12.5 g, 12.5 g, Intravenous, PRN, Best Fletcher MD  •  amLODIPine (NORVASC) tablet 10 mg, 10 mg, Oral, Q24H,  Hank Joaquin MD, 10 mg at 02/12/18 0802  •  arformoterol (BROVANA) nebulizer solution 15 mcg, 15 mcg, Nebulization, BID - RT, Tony Mccauley MD, 15 mcg at 02/09/18 0856  •  aspirin chewable tablet 81 mg, 81 mg, Oral, Daily, Hank Joaquin MD, 81 mg at 02/12/18 0802  •  budesonide (PULMICORT) nebulizer solution 0.25 mg, 0.25 mg, Nebulization, BID - RT, Tony Mccauley MD, 0.25 mg at 02/09/18 0856  •  bumetanide (BUMEX) tablet 2 mg, 2 mg, Oral, BID, Best Fletcher MD, 2 mg at 02/12/18 0802  •  buPROPion SR (WELLBUTRIN SR) 12 hr tablet 200 mg, 200 mg, Oral, Q12H, Hank Joaquin MD, 200 mg at 02/12/18 0802  •  ceFAZolin in dextrose (ANCEF) IVPB solution 2 g, 2 g, Intravenous, Once, Nitin Coronel MD  •  cholecalciferol (VITAMIN D3) tablet 1,000 Units, 1,000 Units, Oral, Daily, Hank Joaquin MD, 1,000 Units at 02/12/18 0802  •  citalopram (CeleXA) tablet 20 mg, 20 mg, Oral, Daily, Hank Joaquin MD, 20 mg at 02/12/18 0802  •  CloNIDine (CATAPRES) tablet 0.2 mg, 0.2 mg, Oral, Q12H, Hank Joaquin MD, 0.2 mg at 02/12/18 0802  •  dextrose (D50W) solution 25 g, 25 g, Intravenous, Q15 Min PRN, Danilo Del Valle MD  •  dextrose (GLUTOSE) oral gel 15 g, 15 g, Oral, Q15 Min PRN, Danilo Del Valle MD  •  epoetin chloe (EPOGEN,PROCRIT) injection 20,000 Units, 20,000 Units, Subcutaneous, Weekly, Hank Joaquin MD, 20,000 Units at 02/06/18 1844  •  gabapentin (NEURONTIN) capsule 300 mg, 300 mg, Oral, Q12H, Hank Joaquin MD, 300 mg at 02/12/18 0802  •  glucagon (human recombinant) (GLUCAGEN DIAGNOSTIC) injection 1 mg, 1 mg, Subcutaneous, PRN, Danilo Del Valle MD  •  heparin (porcine) 5000 UNIT/ML injection 5,000 Units, 5,000 Units, Subcutaneous, Q12H, Best Fletcher MD, 5,000 Units at 02/12/18 0802  •  heparin (porcine) injection 3,000 Units, 3,000 Units, Intracatheter, Once, Tutu Smith MD  •  hydrALAZINE (APRESOLINE) tablet 100 mg, 100 mg, Oral, Q8H,  Best Fletcher MD, 100 mg at 02/12/18 0611  •  hydrocerin (EUCERIN) cream, , Topical, Daily, He Strange MD, 1 application at 02/12/18 0807  •  HYDROcodone-acetaminophen (NORCO) 5-325 MG per tablet 1 tablet, 1 tablet, Oral, Q4H PRN, Hank Joaquin MD, 1 tablet at 02/12/18 0802  •  insulin aspart (novoLOG) injection 0-9 Units, 0-9 Units, Subcutaneous, 4x Daily With Meals & Nightly, Danilo Del Valle MD, 4 Units at 02/11/18 2235  •  insulin detemir (LEVEMIR) injection 10 Units, 10 Units, Subcutaneous, QAM, Shruti Porras MD, 10 Units at 02/12/18 0800  •  insulin detemir (LEVEMIR) injection 22 Units, 22 Units, Subcutaneous, Nightly, Shruti Porras MD, 22 Units at 02/11/18 2235  •  ipratropium-albuterol (DUO-NEB) nebulizer solution 3 mL, 3 mL, Nebulization, Q6H While Awake - RT, He Strange MD, 3 mL at 02/09/18 1501  •  metoprolol tartrate (LOPRESSOR) tablet 50 mg, 50 mg, Oral, Q12H, Hank Joaquin MD, 50 mg at 02/12/18 0802  •  pantoprazole (PROTONIX) EC tablet 40 mg, 40 mg, Oral, BID AC, Danilo Hensley MD, 40 mg at 02/12/18 0657  •  risperiDONE (risperDAL) tablet 2 mg, 2 mg, Oral, Nightly, Hank Joaquin MD, 2 mg at 02/11/18 2135  •  sodium bicarbonate tablet 650 mg, 650 mg, Oral, TID, Hank Joaquin MD, 650 mg at 02/12/18 0802  •  tamsulosin (FLOMAX) 24 hr capsule 0.4 mg, 0.4 mg, Oral, Nightly, Hank Joaquin MD, 0.4 mg at 02/11/18 2134  •  traMADol (ULTRAM) tablet 50 mg, 50 mg, Oral, Q6H PRN, Gage Saavedra Jr., MD, 50 mg at 02/12/18 0215  •  traZODone (DESYREL) tablet 100 mg, 100 mg, Oral, Nightly, Hank Joaquin MD, 100 mg at 02/11/18 6038       Assesment  End-stage renal disease, initiated on hemodialysis  Anemia of chronic disease with iron deficiency  Lower Extremity edema, better with dialysis/ultrafiltration  Diabetes mellitus  Multiple skin ulcers, no antibiotics or vascular intervention needed per consultants  Decubitus  ulcers  Deconditioning, physical therapy following  COPD  Hypertension  Tobacco abuse  Peripheral vascular disease  Erosive esophagitis            Plan:  Tachycardia 120's Afib per RN this am get an EKG tele monitor check TSH cardiology consult  Appreciate all consultants input  Check bp outliers manually by RN  Continue diuretic   Continue TIW HD for HD today  Awaiting precert for rehab  chair in [facility] HD unit starting 2/14/2018  d/c home when ok with all consultants  Get a 24 hour creatinine clearance seems to have recovered some function and gfr 20 and increase bumex to 3 mg po bid to see if can manage fluid with diuretics alone till he gets functional AVF or AVG  D/w patient        Best Fletcher MD  02/12/18  8:33 AM  Tel: [phone]  Fax: [phone]

## 2018-02-12 NOTE — THERAPY TREATMENT NOTE
Acute Care - Physical Therapy Treatment Note  [facility]     Patient Name: Kevin Mckeon V  : 1950  MRN: [id number]  Today's Date: 2018  Onset of Illness/Injury or Date of Surgery Date: 18  Date of Referral to PT: 18  Referring Physician: Mikey    Admit Date: 2018    Visit Dx:  No diagnosis found.  Patient Active Problem List   Diagnosis   • DKA (diabetic ketoacidoses)   • Early satiety   • ARF (acute renal failure)   • CKD (chronic kidney disease) stage 4, GFR 15-29 ml/min   • Nephrotic range proteinuria   • DM (diabetes mellitus) type II controlled with renal manifestation   • Anemia of chronic renal failure   • Ulcer of right knee   • Acute renal failure (ARF)   • PAF (paroxysmal atrial fibrillation)               Adult Rehabilitation Note       18 1127 18 0950       Rehab Assessment/Intervention    Discipline physical therapy assistant  - physical therapist  -DO     Document Type therapy note (daily note)  - therapy note (daily note)  -DO     Subjective Information agree to therapy;complains of;weakness;fatigue  - no complaints;agree to therapy  -DO     Patient Effort, Rehab Treatment  good  -DO     Symptoms Noted During/After Treatment  fatigue  -DO     Precautions/Limitations fall precautions  - fall precautions  -DO     Patient Response to Treatment  Pt did not demonstrate any adverse s/s throughout treatment; VSS throughout.   -DO     Recorded by [RUPESH] Dainelle Pleitez PTA [DO] Chun Brown, PT     Pain Assessment    Pain Assessment No/denies pain  - Land-Levy FACES  -DO     Land-Levy FACES Pain Rating  4  -DO     Pain Location  Foot  -DO     Pain Orientation  Left  -DO     Pain Intervention(s)  Medication (See MAR);Repositioned;Ambulation/increased activity  -DO     Recorded by [RUPESH] Danielle Pleitez PTA [DO] Chun Brown, PT     Cognitive Assessment/Intervention    Current Cognitive/Communication Assessment functional  - functional  -DO      Orientation Status oriented x 4  - oriented x 4  -DO     Follows Commands/Answers Questions  100% of the time;able to follow multi-step instructions  -DO     Personal Safety  WNL/WFL  -DO     Personal Safety Interventions  fall prevention program maintained;gait belt;muscle strengthening facilitated;nonskid shoes/slippers when out of bed  -DO     Recorded by [JM] Danielle Pleitez PTA [DO] Chun Brown, PT     Bed Mobility, Assessment/Treatment    Bed Mobility, Assistive Device bed rails;draw sheet  -      Bed Mob, Supine to Sit, Fults minimum assist (75% patient effort);verbal cues required;nonverbal cues required (demo/gesture)  - minimum assist (75% patient effort);verbal cues required;nonverbal cues required (demo/gesture)  -DO     Bed Mob, Sit to Supine, Fults  minimum assist (75% patient effort);verbal cues required;nonverbal cues required (demo/gesture)  -DO     Bed Mobility, Comment  Pt required HHA to assist in pulling himself up to the EOB.   -DO     Recorded by [JM] Danielle Pleitez PTA [DO] Chun Brown, PT     Transfer Assessment/Treatment    Transfers, Sit-Stand Fults minimum assist (75% patient effort);verbal cues required;nonverbal cues required (demo/gesture);2 person assist required  - minimum assist (75% patient effort);verbal cues required;nonverbal cues required (demo/gesture)  -DO     Transfers, Stand-Sit Fults minimum assist (75% patient effort);verbal cues required;nonverbal cues required (demo/gesture)  - minimum assist (75% patient effort);verbal cues required;nonverbal cues required (demo/gesture)  -DO     Transfers, Sit-Stand-Sit, Assist Device rolling walker  - rolling walker  -DO     Toilet Transfer, Fults  minimum assist (75% patient effort);verbal cues required;nonverbal cues required (demo/gesture)  -DO     Toilet Transfer, Assistive Device  rolling walker  -DO     Transfer, Comment  Pt requires cueing for proper use of Rwx and to  control descent when performing stand-sit.   -DO     Recorded by [] Danielle Pleitez PTA [DO] Chun Brown, PT     Gait Assessment/Treatment    Gait, [place] Level verbal cues required;nonverbal cues required (demo/gesture);contact guard assist;minimum assist (75% patient effort);2 person assist required  - minimum assist (75% patient effort);verbal cues required;nonverbal cues required (demo/gesture)  -DO     Gait, Assistive Device rolling walker  - rolling walker  -DO     Gait, Distance (Feet) 40  - 20  -DO     Gait, Gait Deviations shayan decreased;forward flexed posture;step length decreased;weight-shifting ability decreased;narrow base  - shayan decreased;forward flexed posture;step length decreased;stride length decreased;weight-shifting ability decreased  -DO     Gait, Comment  Pt only able to tolerate ambulation to the bathroom and back to bed after voiding. Pt declined further ambulation secondary to fatigue.   -DO     Recorded by [RUPESH] Danielle Pleitez PTA [DO] Chun Brown PT     Stairs Assessment/Treatment    Stairs, [place] Level not tested  - not tested  -DO     Recorded by [RUPESH] Danielle Pleitez PTA [DO] Chun Brown PT     Positioning and Restraints    Pre-Treatment Position in bed  - in bed  -DO     Post Treatment Position chair  - bed  -DO     In Bed  supine;call light within reach;encouraged to call for assist;exit alarm on;notified nsg  -DO     In Chair sitting;with nsg   nsg present to bathe pt  -      Recorded by [RUPESH] Danielle Pleitez PTA [DO] Chun Brown PT       User Key  (r) = Recorded By, (t) = Taken By, (c) = Cosigned By    Initials Name Effective Dates    RUPESH Pleitez PTA 02/18/16 -     DO Chun Brown PT 01/27/16 -                 IP PT Goals       02/07/18 0834          Bed Mobility PT LTG    Bed Mobility PT LTG, Date Established 02/07/18  -LC      Bed Mobility PT LTG, Time to Achieve 1 wk  -LC      Bed Mobility PT LTG, Activity Type all  bed mobility  -LC      Bed Mobility PT LTG, [place] Level supervision required  -LC      Bed Mobility PT Goal  LTG, Assist Device bed rails  -LC      Transfer Training PT LTG    Transfer Training PT LTG, Date Established 02/07/18  -LC      Transfer Training PT LTG, Time to Achieve 1 wk  -LC      Transfer Training PT LTG, Activity Type all transfers  -LC      Transfer Training PT LTG, [place] Level supervision required  -LC      Transfer Training PT LTG, Assist Device walker, rolling  -LC      Gait Training PT LTG    Gait Training Goal PT LTG, Date Established 02/07/18  -LC      Gait Training Goal PT LTG, Time to Achieve 1 wk  -LC      Gait Training Goal PT LTG, [place] Level supervision required  -LC      Gait Training Goal PT LTG, Assist Device walker, rolling  -LC      Gait Training Goal PT LTG, Distance to Achieve 200  -LC        User Key  (r) = Recorded By, (t) = Taken By, (c) = Cosigned By    Initials Name Provider Type    BRENDAN Odom, PT DPT Physical Therapist          Physical Therapy Education     Title: PT OT SLP Therapies (Done)     Topic: Physical Therapy (Done)     Point: Mobility training (Done)    Learning Progress Summary    Learner Readiness Method Response Comment Documented by Status   Patient Acceptance E VU  MS 02/11/18 1906 Done    Acceptance E VU  DO 02/11/18 1019 Done    Acceptance E,TB,D VU,NR  CH 02/09/18 1019 Done    Acceptance E,TB VU  SB 02/07/18 2054 Done    Acceptance E,D VU,DU safety during transfers and gait, benefits of activity LC 02/07/18 0834 Done               Point: Home exercise program (Done)    Learning Progress Summary    Learner Readiness Method Response Comment Documented by Status   Patient Acceptance E VU  MS 02/11/18 1906 Done    Acceptance E VU  DO 02/11/18 1019 Done    Acceptance E,TB VU  SB 02/07/18 2054 Done               Point: Body mechanics (Done)    Learning Progress Summary    Learner Readiness Method Response Comment Documented by Status    Patient Acceptance E VU  MS 02/11/18 1906 Done    Acceptance E VU  DO 02/11/18 1019 Done    Acceptance E,TB,D VU,NR   02/09/18 1019 Done               Point: Precautions (Done)    Learning Progress Summary    Learner Readiness Method Response Comment Documented by Status   Patient Acceptance E VU  MS 02/11/18 1906 Done    Acceptance E VU  DO 02/11/18 1019 Done    Acceptance E,TB,D VU,NR   02/09/18 1019 Done                      User Key     Initials Effective Dates Name Provider Type Discipline     12/01/15 -  Taylor Sarabia, PT Physical Therapist PT    DO 01/27/16 -  Chun Brown, PT Physical Therapist PT    SB 06/16/16 -  Leah Brice, RN Registered Nurse Nurse     08/02/16 -  Abhay Odom, PT DPT Physical Therapist PT    MS 10/30/17 -  Nohelia Marie, KATARINA Registered Nurse Nurse                    PT Recommendation and Plan  Anticipated Discharge Disposition: skilled nursing facility  PT Frequency: daily             Outcome Measures       02/12/18 1100 02/11/18 1000       How much help from another person do you currently need...    Turning from your back to your side while in flat bed without using bedrails? 3  - 3  -DO     Moving from lying on back to sitting on the side of a flat bed without bedrails? 3  - 3  -DO     Moving to and from a bed to a chair (including a wheelchair)? 3  - 3  -DO     Standing up from a chair using your arms (e.g., wheelchair, bedside chair)? 3  - 3  -DO     Climbing 3-5 steps with a railing? 2  -JM 2  -DO     To walk in hospital room? 3  - 3  -DO     AM-PAC 6 Clicks Score 17  - 17  -DO     Functional Assessment    Outcome Measure Options  AM-PAC 6 Clicks Basic Mobility (PT)  -DO       User Key  (r) = Recorded By, (t) = Taken By, (c) = Cosigned By    Initials Name Provider Type    RUPESH Pleitez PTA Physical Therapy Assistant    DO Chun Brown, PT Physical Therapist           Time Calculation:         PT Charges       02/12/18 1130          Time  Calculation    Start Time 1025  -RUPESH      Stop Time 1041  -RUPESH      Time Calculation (min) 16 min  -RUPESH      PT Received On 02/12/18  -RUPESH      PT - Next Appointment 02/13/18  -RUPESH        User Key  (r) = Recorded By, (t) = Taken By, (c) = Cosigned By    Initials Name Provider Type    RUPESH Pleitez PTA Physical Therapy Assistant          Therapy Charges for Today     Code Description Service Date Service Provider Modifiers Qty    48943968852 HC PT THER PROC EA 15 MIN 2/12/2018 Danielle Pleitez PTA GP 1    99551195301 HC PT THER SUPP EA 15 MIN 2/12/2018 Danielle Pleitez PTA GP 1          PT G-Codes  Outcome Measure Options: AM-PAC 6 Clicks Basic Mobility (PT)    Danielle Pleitez PTA  2/12/2018

## 2018-02-12 NOTE — PROGRESS NOTES
[facility]  Phone: [phone]  Dimitri Davila MD    Subjective:  LOS: 7    Has COPD but does not use O2 or inhalers at home. This morning he developed afib with rvr. Now some better. He states his toes were hurting a lot though improved now.    Objective   Vital Signs past 24hrs  BP range: BP: (134-153)/(74-89) 134/89  Pulse range: Heart Rate:  [] 119  Resp rate range: Resp:  [18] 18  Temp range: Temp (24hrs), Av.3 °F (36.8 °C), Min:97.7 °F (36.5 °C), Max:99.1 °F (37.3 °C)    O2 Device: nasal cannulaFlow (L/min):  [2-4] 2  Oxygen range:SpO2:  [91 %-98 %] 93 %   83.7 kg (184 lb 8.4 oz); Body mass index is 25.02 kg/(m^2).    Intake/Output Summary (Last 24 hours) at 18 1414  Last data filed at 18 1246   Gross per 24 hour   Intake              330 ml   Output              570 ml   Net             -240 ml       Physical Exam   Cardiovascular: Normal rate and regular rhythm.    No murmur heard.  Pulmonary/Chest: He has decreased breath sounds. He has no wheezes. He has rales in the right lower field and the left lower field.   Abdominal: Soft. Bowel sounds are normal. There is no tenderness.   Musculoskeletal: He exhibits edema (R>L).   Left leg wrapped  Right leg (foot) somewhat cool   Neurological: He is alert.   Nursing note and vitals reviewed.    Results Review:    I have reviewed the laboratory and imaging data since the last note by [facility] physician.  My annotations are noted in assessment and plan.    Medication Review:  I have reviewed the current MAR.  My annotations are noted in assessment and plan.       Plan   PCCM Problems  AHRF  Acute fluid overload  Bilateral pleural effusions  MING on CPAP  COPD without exac  Relevant Medical Diagnoses  ESRD  DM2  Cool RLE  NEW Afib    Plan of Treatment  Will check walkox after HD to see if he can come off O2. Defer to later as not going home yet.    Not on rx for COPD so presumably mild. If he tolerates, give brovana and budesonide nebs.  Stop scheduled duonebs.    Continue home CPAP.    Manage fluid overload with ESRD per renal.    Cool RLE. Do vascular study.    Afib new, cardiology consulted.    Dimitri Davila MD  02/12/18  2:14 PM    Part of this note may be an electronic transcription/translation of spoken language to printed text using the Dragon Dictation System.

## 2018-02-12 NOTE — PLAN OF CARE
Problem: Patient Care Overview (Adult)  Goal: Plan of Care Review  Outcome: Ongoing (interventions implemented as appropriate)   02/11/18 8555   Coping/Psychosocial Response Interventions   Plan Of Care Reviewed With patient   Patient Care Overview   Progress progress toward functional goals as expected   Outcome Evaluation   Outcome Summary/Follow up Plan Patient c/o chronic pain, but is tolerating his meds well. Blood glucose started low at 72 in the am. and Levemir dosage ajusted tonight per MD. Pleasant and expecting transfer to [facility] in [facility] tomorrow. Dressing to Left heel changed today and wound is showing signs of healing without drainage.     Goal: Adult Individualization and Mutuality  Outcome: Ongoing (interventions implemented as appropriate)    Goal: Discharge Needs Assessment  Outcome: Ongoing (interventions implemented as appropriate)      Problem: Fall Risk (Adult)  Goal: Absence of Falls  Outcome: Ongoing (interventions implemented as appropriate)      Problem: Renal Failure/Kidney Injury, Acute (Adult)  Goal: Signs and Symptoms of Listed Potential Problems Will be Absent or Manageable (Renal Failure/Kidney Injury, Acute)  Outcome: Ongoing (interventions implemented as appropriate)      Problem: Diabetes, Type 2 (Adult)  Goal: Signs and Symptoms of Listed Potential Problems Will be Absent or Manageable (Diabetes, Type 2)  Outcome: Ongoing (interventions implemented as appropriate)      Problem: Pressure Ulcer (Adult)  Goal: Signs and Symptoms of Listed Potential Problems Will be Absent or Manageable (Pressure Ulcer)  Outcome: Ongoing (interventions implemented as appropriate)      Problem: Perioperative Period (Adult)  Goal: Signs and Symptoms of Listed Potential Problems Will be Absent or Manageable (Perioperative Period)  Outcome: Ongoing (interventions implemented as appropriate)

## 2018-02-12 NOTE — PLAN OF CARE
Problem: Patient Care Overview (Adult)  Goal: Plan of Care Review  Outcome: Ongoing (interventions implemented as appropriate)   02/12/18 1131   Coping/Psychosocial Response Interventions   Plan Of Care Reviewed With patient   Patient Care Overview   Progress improving   Outcome Evaluation   Outcome Summary/Follow up Plan incr amb dist, but assist of 2 for safety due to unsteady gt

## 2018-02-12 NOTE — PAYOR COMM NOTE
"Kevin Aguiar (67 y.o. Male)     PLEASE SEE ATTACHED CLINICAL REVIEW.     REF#[id number]    PLEASE CALL   OR  [phone] WITH CONTINUED STAY AUTH.    THANK YOU    MAITE STAFFORD LPN, CCP    Date of Birth Social Security Number Address Home Phone MRN    1950  [id number] [place] [address] [phone] [id number]    [place] Marital Status          None Single       Admission Date Admission Type Admitting Provider Attending Provider Department, Room/Bed    2/5/18 Urgent Hank Joaquin MD [place], Hank Silveira MD [address], 636/1    Discharge Date Discharge Disposition Discharge Destination                      Attending Provider: Hank Joaquin MD     Allergies:  No Known Allergies    Isolation:  Contact   Infection:  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)   Code Status:  FULL    Ht:  182.9 cm (72.01\")   Wt:  83.7 kg (184 lb 8.4 oz)    Admission Cmt:  None   Principal Problem:  None                Active Insurance as of 2/5/2018     Primary Coverage     Payor Plan Insurance Group Employer/Plan Group    [facility]      Payor Plan Address Payor Plan Phone Number Effective From Effective To    [address] [address] [phone] 1/17/2018     [place] [address]       Subscriber Name Subscriber Birth Date Member ID       KEVIN AGUIAR 1950 [id number]                 Emergency Contacts      (Rel.) Home Phone Work Phone Mobile Phone    Dick Aguiar (Son) [phone] -- --    Deisy Aguiar (Sister) [phone] -- --              Intake & Output (last day)       02/11 0701 - 02/12 0700 02/12 0701 - 02/13 0700    P.O.  210    Total Intake(mL/kg)  210 (2.5)    Urine (mL/kg/hr) 570 (0.3)     Stool      Total Output 570      Net -570 +210          Unmeasured Urine Occurrence 2 x         Operative/Procedure Notes (last 24 hours) (Notes from 2/11/2018  9:22 AM through 2/12/2018  9:22 AM)     No notes of this type exist for " this encounter.           Physician Progress Notes (last 24 hours) (Notes from 2018  9:22 AM through 2018  9:22 AM)      Dimitri Davila MD at 2018  2:02 PM  Version 1 of 1         [facility]  Phone: [phone]  Dimitri Davila MD    Subjective:  LOS: 6    Has COPD but does not use O2 or inhalers at home.    Objective   Vital Signs past 24hrs  BP range: BP: (140-154)/(80-85) 154/82  Pulse range: Heart Rate:  [66-67] 66  Resp rate range: Resp:  [18] 18  Temp range: Temp (24hrs), Av.1 °F (36.7 °C), Min:97.6 °F (36.4 °C), Max:99 °F (37.2 °C)    O2 Device: nasal cannulaFlow (L/min):  [3-4] 3  Oxygen range:SpO2:  [90 %-96 %] 96 %   83.7 kg (184 lb 8.4 oz); Body mass index is 25.02 kg/(m^2).    Intake/Output Summary (Last 24 hours) at 18 1402  Last data filed at 18 0342   Gross per 24 hour   Intake                0 ml   Output              550 ml   Net             -550 ml       Physical Exam   Cardiovascular: Normal rate and regular rhythm.    No murmur heard.  Pulmonary/Chest: He has decreased breath sounds. He has no wheezes. He has rales in the right lower field and the left lower field.   Abdominal: Soft. Bowel sounds are normal. There is no tenderness.   Musculoskeletal: He exhibits edema.   LE cellulitis   Neurological: He is alert.   Nursing note and vitals reviewed.    Results Review:    I have reviewed the laboratory and imaging data since the last note by [facility] physician.  My annotations are noted in assessment and plan.    Medication Review:  I have reviewed the current MAR.  My annotations are noted in assessment and plan.       Plan   PCCM Problems  AHRF  Acute fluid overload  Bilateral pleural effusions  MING on CPAP  COPD without exac  Relevant Medical Diagnoses  ESRD  DM2  Cellulitis    Plan of Treatment  Note HD on Monday. Will check walkox after HD to see if he can come off O2.    Not on rx for COPD so presumably mild.    Continue home CPAP.    Manage fluid overload  with ESRD per renal.    No acute LE infection.    Dimitri Davila MD  02/11/18  2:02 PM    Part of this note may be an electronic transcription/translation of spoken language to printed text using the Dragon Dictation System.       Electronically signed by Dimitri Davila MD at 2/11/2018  2:03 PM      Best Fletcher MD at 2/11/2018  5:18 PM  Version 1 of 1         Subjective except chronic foot pain has no c/o denies cp/sob   Chief complaint:esrd         Objective:nad      Vital Signs  Temp:  [97.6 °F (36.4 °C)-99 °F (37.2 °C)] 98.5 °F (36.9 °C)  Heart Rate:  [64-67] 64  Resp:  [18] 18  BP: (141-154)/(80-85) 141/80        Intake/Output Summary (Last 24 hours) at 02/11/18 1718  Last data filed at 02/11/18 0342   Gross per 24 hour   Intake                0 ml   Output              550 ml   Net             -550 ml           Physical Exam    Constitutional : well developed ,No acute distress  ENMT lips pink oral mucosa moist   Eyes sclerae white PERRL  Respiratory: Clear to auscultation , No crackles no wheezing respirations are even and un-labored  GI: Soft, Non tender, BS active in all 4 quadrants  CVS: S1 S2 regular, no rub murmur or gallops  Skin warm dry no rashes no petechiae multiple wounds heel right below knee right   Neuro grossly nonfocal cranial nerves 2-12 grossly intact sensation intact  Ext: b/l LE edema, Peripheral pulses intact   Heme no cervical  lymphadenopathy no petechiae  RIJ TDC no signs of inflammation         Results Review:      Lab Results   Component Value Date    GLUCOSE 65 02/11/2018    CALCIUM 8.1 (L) 02/11/2018     02/11/2018    K 4.4 02/11/2018    CO2 26.2 02/11/2018     02/11/2018    BUN 34 (H) 02/11/2018    CREATININE 3.11 (H) 02/11/2018    EGFRIFNONA 20 (L) 02/11/2018    BCR 10.9 02/11/2018    ANIONGAP 9.8 02/11/2018       Lab Results   Component Value Date    WBC 8.37 02/11/2018    HGB 9.3 (L) 02/11/2018    HCT 31.9 (L) 02/11/2018    MCV 94.4 02/11/2018     02/11/2018        Pertinent Imaging studies were reviewed      Medication Review:       Current Facility-Administered Medications:   •  acetaminophen (TYLENOL) tablet 650 mg, 650 mg, Oral, Q6H PRN, Hank Joaquin MD  •  amLODIPine (NORVASC) tablet 10 mg, 10 mg, Oral, Q24H, Hank Joaquin MD, 10 mg at 02/11/18 0823  •  arformoterol (BROVANA) nebulizer solution 15 mcg, 15 mcg, Nebulization, BID - RT, Tony Mccauley MD, 15 mcg at 02/09/18 0856  •  aspirin chewable tablet 81 mg, 81 mg, Oral, Daily, Hank Joaquin MD, 81 mg at 02/11/18 0822  •  budesonide (PULMICORT) nebulizer solution 0.25 mg, 0.25 mg, Nebulization, BID - RT, Tony Mccauley MD, 0.25 mg at 02/09/18 0856  •  bumetanide (BUMEX) tablet 2 mg, 2 mg, Oral, BID, Best Fletcher MD, 2 mg at 02/11/18 0823  •  buPROPion SR (WELLBUTRIN SR) 12 hr tablet 200 mg, 200 mg, Oral, Q12H, Hank Joaquin MD, 200 mg at 02/11/18 0822  •  ceFAZolin in dextrose (ANCEF) IVPB solution 2 g, 2 g, Intravenous, Once, Nitin Coronel MD  •  cholecalciferol (VITAMIN D3) tablet 1,000 Units, 1,000 Units, Oral, Daily, Hank Joaquin MD, 1,000 Units at 02/11/18 0823  •  citalopram (CeleXA) tablet 20 mg, 20 mg, Oral, Daily, Hank Joaquin MD, 20 mg at 02/11/18 0823  •  CloNIDine (CATAPRES) tablet 0.2 mg, 0.2 mg, Oral, Q12H, Hank Joaquin MD, 0.2 mg at 02/11/18 0823  •  dextrose (D50W) solution 25 g, 25 g, Intravenous, Q15 Min PRN, Danilo Del Valle MD  •  dextrose (GLUTOSE) oral gel 15 g, 15 g, Oral, Q15 Min PRN, Danilo Del Valle MD  •  epoetin chloe (EPOGEN,PROCRIT) injection 20,000 Units, 20,000 Units, Subcutaneous, Weekly, Hank Joaquin MD, 20,000 Units at 02/06/18 1844  •  gabapentin (NEURONTIN) capsule 300 mg, 300 mg, Oral, Q12H, Hank Joaquin MD, 300 mg at 02/11/18 0822  •  glucagon (human recombinant) (GLUCAGEN DIAGNOSTIC) injection 1 mg, 1 mg, Subcutaneous, PRN, Danilo Del Valle MD  •  heparin (porcine) 5000 UNIT/ML  injection 5,000 Units, 5,000 Units, Subcutaneous, Q12H, Best Fletcher MD, 5,000 Units at 02/11/18 0823  •  heparin (porcine) injection 3,000 Units, 3,000 Units, Intracatheter, Once, Tutu Smith MD  •  hydrALAZINE (APRESOLINE) tablet 100 mg, 100 mg, Oral, Q8H, Best Fletcher MD, 100 mg at 02/11/18 0540  •  hydrocerin (EUCERIN) cream, , Topical, Daily, He Strange MD  •  HYDROcodone-acetaminophen (NORCO) 5-325 MG per tablet 1 tablet, 1 tablet, Oral, Q4H PRN, Hank Joaquin MD, 1 tablet at 02/11/18 1318  •  insulin aspart (novoLOG) injection 0-9 Units, 0-9 Units, Subcutaneous, 4x Daily With Meals & Nightly, Danilo Del Valle MD, 4 Units at 02/10/18 1737  •  insulin detemir (LEVEMIR) injection 28 Units, 28 Units, Subcutaneous, Nightly, Shruti Porras MD, 28 Units at 02/10/18 2153  •  ipratropium-albuterol (DUO-NEB) nebulizer solution 3 mL, 3 mL, Nebulization, Q6H While Awake - RT, He Strange MD, 3 mL at 02/09/18 1501  •  metoprolol tartrate (LOPRESSOR) tablet 50 mg, 50 mg, Oral, Q12H, Hank Joaquin MD, 50 mg at 02/11/18 0823  •  pantoprazole (PROTONIX) EC tablet 40 mg, 40 mg, Oral, BID AC, Danilo Hensley MD, 40 mg at 02/11/18 0822  •  risperiDONE (risperDAL) tablet 2 mg, 2 mg, Oral, Nightly, Hank Joaquin MD, 2 mg at 02/10/18 2153  •  sodium bicarbonate tablet 650 mg, 650 mg, Oral, TID, Hank Joaquin MD, 650 mg at 02/11/18 0823  •  tamsulosin (FLOMAX) 24 hr capsule 0.4 mg, 0.4 mg, Oral, Nightly, Hank Joaquin MD, 0.4 mg at 02/10/18 2152  •  traMADol (ULTRAM) tablet 50 mg, 50 mg, Oral, Q6H PRN, Gage Saavedra Jr., MD  •  traZODone (DESYREL) tablet 100 mg, 100 mg, Oral, Nightly, Hank Joaquin MD, 100 mg at 02/10/18 2152       Assesment  End-stage renal disease, initiated on hemodialysis  Anemia of chronic disease with iron deficiency  Lower Extremity edema, better with dialysis/ultrafiltration  Diabetes mellitus  Multiple skin  "ulcers, no antibiotics or vascular intervention needed per consultants  Decubitus ulcers  Deconditioning, physical therapy following  COPD  Hypertension  Tobacco abuse  Peripheral vascular disease  Erosive esophagitis          Plan:  Appreciate all consultants input  Check bp outliers manually by RN  Add ARB if still high bp  Continue diuretic still edema but beter  Continue TIW HD  Awaiting precert for rehab  chair in [facility] HD unit starting 2/14/2018  Likely d/c tomorrow after HD if ok with all consultants  D/w patient        Best Fletcher MD  02/11/18  5:18 PM  Tel: [phone]  Fax: [phone]       Electronically signed by Best Fletcher MD at 2/11/2018  5:20 PM      Shruti Porras MD at 2/11/2018  6:52 PM  Version 1 of 1              LOS: 6 days   Primary Care Physician: Alicia Harley MD     Subjective   Sleeping but awakens easily.  Didn't sleep much last night    Vital Signs  Body mass index is 25.02 kg/(m^2).  Temp:  [97.6 °F (36.4 °C)-99 °F (37.2 °C)] 98.5 °F (36.9 °C)  Heart Rate:  [64-67] 64  Resp:  [18] 18  BP: (141-154)/(80-85) 141/80      Objective:  General Appearance:  In no acute distress.    Vital signs: (most recent): Blood pressure 141/80, pulse 64, temperature 98.5 °F (36.9 °C), temperature source Oral, resp. rate 18, height 182.9 cm (72.01\"), weight 83.7 kg (184 lb 8.4 oz), SpO2 96 %.    Lungs:  There are rales and decreased breath sounds.  No wheezes or rhonchi.  (Occasional scattered crackle)  Heart: Normal rate.  Regular rhythm.  No murmur.   Abdomen: Abdomen is soft.  (Obese)Bowel sounds are normal.   There is no abdominal tenderness.   There is no splenomegaly. There is no hepatomegaly.   Extremities: There is dependent edema.  (Trace at ankles)  Neurological: Patient is alert.          Results Review:    I reviewed the patient's new clinical results.      Results from last 7 days  Lab Units 02/11/18  0634 02/10/18  0622   WBC 10*3/mm3 8.37 4.97   HEMOGLOBIN g/dL 9.3* 7.8* "   PLATELETS 10*3/mm3 257 242       Results from last 7 days  Lab Units 18  0634 02/10/18  0622   SODIUM mmol/L 139 138   POTASSIUM mmol/L 4.4 4.1   CHLORIDE mmol/L 103 101   CO2 mmol/L 26.2 29.2*   BUN mg/dL 34* 24*   CREATININE mg/dL 3.11* 2.80*   CALCIUM mg/dL 8.1* 8.1*   GLUCOSE mg/dL 65 51*         Hemoglobin A1C:  Lab Results   Component Value Date    HGBA1C 10.60 (H) 2018       Glucose Range:  Glucose   Date/Time Value Ref Range Status   2018 1536 179 (H) 70 - 130 mg/dL Final   2018 1146 107 70 - 130 mg/dL Final   2018 0746 72 70 - 130 mg/dL Final   02/10/2018 2030 133 (H) 70 - 130 mg/dL Final   02/10/2018 1554 201 (H) 70 - 130 mg/dL Final   02/10/2018 1146 262 (H) 70 - 130 mg/dL Final       Lab Results   Component Value Date    [id number] 587 2018       No results found for: TSH    Assessment & Plan      Medication Review: Yes    Active Hospital Problems (** Indicates Principal Problem)    Diagnosis Date Noted   • Acute renal failure (ARF) [N17.9] 2018      Resolved Hospital Problems    Diagnosis Date Noted Date Resolved   No resolved problems to display.       Assessment/Plan  1.  Diabetes mellitus2.  He had hypoglycemia again his morning early with glucose of 65 around 6:30 AM.  Will further decrease evening dose of Levemir and start a low dose of Levemir with breakfast.  Continue sliding scale as needed.  2.  Iron deficiency anemia and anemia of chronic disease.  Hemoglobin improved.  3.  End-stage renal disease, now on dialysis.  4.  Acute hypoxic respiratory failure.  Oximetry tomorrow after dialysis    Shruti Porras MD  18  6:52 PM           Electronically signed by Shruti Porras MD at 2018  6:59 PM      Bettie Gutierrez MD at 2018  8:36 PM  Version 2 of 2           Infectious Diseases Progress Note    Bettie Gutierrez MD     [facility]  [place]: 6 days  Patient Identification:  Name: Kevin Mckeon V  Age: 67 y.o.  Sex: male  :   "1950  MRN: [id number]         Primary Care Physician: Alicia Harley MD            Subjective: Feeling better denies any specific symptoms no fever no chills.  There is no worsening in the lower extremity discomfort.  Interval History: See initial consultation note.    Objective:    Scheduled Meds:    amLODIPine 10 mg Oral Q24H   arformoterol 15 mcg Nebulization BID - RT   aspirin 81 mg Oral Daily   budesonide 0.25 mg Nebulization BID - RT   bumetanide 2 mg Oral BID   buPROPion  mg Oral Q12H   ceFAZolin 2 g Intravenous Once   cholecalciferol 1,000 Units Oral Daily   citalopram 20 mg Oral Daily   CloNIDine 0.2 mg Oral Q12H   epoetin chloe 20,000 Units Subcutaneous Weekly   gabapentin 300 mg Oral Q12H   heparin (porcine) 5,000 Units Subcutaneous Q12H   heparin (porcine) 3,000 Units Intracatheter Once   hydrALAZINE 100 mg Oral Q8H   hydrocerin  Topical Daily   insulin aspart 0-9 Units Subcutaneous 4x Daily With Meals & Nightly   [START ON 2/12/2018] insulin detemir 10 Units Subcutaneous QAM   insulin detemir 22 Units Subcutaneous Nightly   ipratropium-albuterol 3 mL Nebulization Q6H While Awake - RT   metoprolol tartrate 50 mg Oral Q12H   pantoprazole 40 mg Oral BID AC   risperiDONE 2 mg Oral Nightly   sodium bicarbonate 650 mg Oral TID   tamsulosin 0.4 mg Oral Nightly   traZODone 100 mg Oral Nightly     Continuous Infusions:     Vital signs in last 24 hours:  Temp:  [97.9 °F (36.6 °C)-99 °F (37.2 °C)] 98.4 °F (36.9 °C)  Heart Rate:  [64-72] 72  Resp:  [18] 18  BP: (135-154)/(74-85) 135/74    Intake/Output:    Intake/Output Summary (Last 24 hours) at 02/11/18 2036  Last data filed at 02/11/18 1910   Gross per 24 hour   Intake                0 ml   Output              700 ml   Net             -700 ml       Exam:  /74 (BP Location: Right arm, Patient Position: Lying)  Pulse 72  Temp 98.4 °F (36.9 °C) (Oral)   Resp 18  Ht 182.9 cm (72.01\")  Wt 83.7 kg (184 lb 8.4 oz)  SpO2 98%  BMI 25.02 " kg/m2    General Appearance:    Alert, cooperative, no distress, AAOx3                          Head:    Normocephalic, without obvious abnormality, atraumatic                           Eyes:    PERRL, conjunctiva/corneas clear, EOM's intact, both eyes                         Throat:   Lips, tongue, gums normal; oral mucosa pink and moist                           Neck:   Supple, symmetrical, trachea midline, no JVD                         Lungs:    Clear to auscultation bilaterally, respirations unlabored                 Chest Wall:    No tenderness or deformity, right IJ tunnel catheter in place                          Heart:    Regular rate and rhythm, S1 and S2 normal, no murmur,no  Rub                                      or gallop                  Abdomen:     Soft, non-tender, bowel sounds active, no masses, no                                                        organomegaly                  Extremities:  Left ankle is dressed decreased erythema and warmth of the bilateral lower extremitiescellulitis noted.                          Data Review:    I reviewed the patient's new clinical results.    Results from last 7 days  Lab Units 02/11/18  0634 02/10/18  0622 02/09/18  0509 02/08/18  0514 02/07/18  0558 02/06/18  2212 02/06/18  1402 02/06/18  0440   WBC 10*3/mm3 8.37 4.97 5.01 5.73  --   --   --  7.48   HEMOGLOBIN g/dL 9.3* 7.8* 8.0* 8.0* 8.2* 8.2* 6.8* 6.9*   PLATELETS 10*3/mm3 257 242 239 239  --   --   --  266       Results from last 7 days  Lab Units 02/11/18  0634 02/10/18  0622 02/09/18  0509 02/08/18  0514 02/07/18  0558 02/06/18  0440   SODIUM mmol/L 139 138 139 138 139 135*   POTASSIUM mmol/L 4.4 4.1 3.8 3.9 3.9 4.9   CHLORIDE mmol/L 103 101 102 102 102 100   CO2 mmol/L 26.2 29.2* 29.2* 27.5 24.3 22.6   BUN mg/dL 34* 24* 18 36* 29* 63*   CREATININE mg/dL 3.11* 2.80* 2.01* 2.78* 2.40* 3.85*   CALCIUM mg/dL 8.1* 8.1* 8.2* 8.0* 8.0* 8.3*   GLUCOSE mg/dL 65 51* 88 111* 98 258*     Microbiology  Results (last 10 days)     Procedure Component Value - Date/Time    Respiratory Panel, PCR - Swab, Nasopharynx [[id number]]  (Normal) Collected:  02/06/18 1337    Lab Status:  Final result Specimen:  Swab from Nasopharynx Updated:  02/06/18 1636     ADENOVIRUS, PCR Not Detected     Coronavirus 229E Not Detected     Coronavirus HKU1 Not Detected     Coronavirus NL63 Not Detected     Coronavirus OC43 Not Detected     Human Metapneumovirus Not Detected     Human Rhinovirus/Enterovirus Not Detected     Influenza B PCR Not Detected     Parainfluenza Virus 1 Not Detected     Parainfluenza Virus 2 Not Detected     Parainfluenza Virus 3 Not Detected     Parainfluenza Virus 4 Not Detected     Bordetella pertussis pcr Not Detected     Influenza A H1N1 2009 PCR Not Detected     Chlamydophila pneumoniae PCR Not Detected     Mycoplasma pneumo by PCR Not Detected     Influenza A PCR Not Detected     Influenza A H3 Not Detected     Influenza A H1 Not Detected     RSV, PCR Not Detected            Assessment:  1-bilateral lower extremity chronic edema with stasis changes and immobility related wound with no obvious active infectionAt this time but low threshold to recommend systemic antibiotic therapy if he shows leukocytosis fever or change in mental status.  2-right heel decubitus changes with wound with no surrounding cellulitis this time but did risk of infection going forward for which she needs to be monitored.  3-anemia with progressive drop in hemoglobin likely due to GI loss.  4-hypercapnic hypoxemic respiratory failure on BiPAP  5-acute on chronic renal failure  6-underlying diabetes, hypertension, neuropathy  7-history of MRSA colonization.  Medications:  Continue to observe off of antibiotic therapy with periodic reassessments.    Bettie Gutierrez MD  2/11/2018  8:36 PM    Much of this encounter note is an electronic transcription/translation of spoken language to printed text. The electronic translation of spoken language  may permit erroneous, or at times, nonsensical words or phrases to be inadvertently transcribed; Although I have reviewed the note for such errors, some may still exist       Electronically signed by Bettie Gutierrez MD at 2/12/2018  4:56 AM           Best Fletcher MD at 2/12/2018  8:33 AM  Version 3 of 3         Subjective except chronic foot pain has no c/o denies cp/sob   Chief complaint:esrd         Objective:nad      Vital Signs  Temp:  [97.7 °F (36.5 °C)-99.1 °F (37.3 °C)] 97.8 °F (36.6 °C)  Heart Rate:  [] 101  Resp:  [18] 18  BP: (135-153)/(74-87) 142/84        Intake/Output Summary (Last 24 hours) at 02/12/18 0833  Last data filed at 02/12/18 0832   Gross per 24 hour   Intake              210 ml   Output              570 ml   Net             -360 ml           Physical Exam    Constitutional : well developed ,No acute distress  ENMT lips pink oral mucosa moist   Eyes sclerae white PERRL  Respiratory: Clear to auscultation , No crackles no wheezing respirations are even and un-labored  GI: Soft, Non tender, BS active in all 4 quadrants  CVS: S1 S2 regular, no rub murmur or gallops  Skin warm dry no rashes no petechiae multiple wounds heel right below knee right   Neuro grossly nonfocal cranial nerves 2-12 grossly intact sensation intact  Ext: b/l LE edema, Peripheral pulses intact   Heme no cervical  lymphadenopathy no petechiae  RIJ TDC no signs of inflammation         Results Review:      Lab Results   Component Value Date    GLUCOSE 166 (H) 02/12/2018    CALCIUM 7.9 (L) 02/12/2018     02/12/2018    K 4.4 02/12/2018    CO2 25.9 02/12/2018    CL 99 02/12/2018    BUN 42 (H) 02/12/2018    CREATININE 3.13 (H) 02/12/2018    EGFRIFNONA 20 (L) 02/12/2018    BCR 13.4 02/12/2018    ANIONGAP 12.1 02/12/2018       Lab Results   Component Value Date    WBC 5.81 02/12/2018    HGB 9.0 (L) 02/12/2018    HCT 30.8 (L) 02/12/2018    MCV 95.4 02/12/2018     02/12/2018       Pertinent Imaging studies were  reviewed      Medication Review:       Current Facility-Administered Medications:   •  acetaminophen (TYLENOL) tablet 650 mg, 650 mg, Oral, Q6H PRN, Hank Joaquin MD  •  albumin human 25 % IV SOLN 12.5 g, 12.5 g, Intravenous, PRN, Best Fletcher MD  •  amLODIPine (NORVASC) tablet 10 mg, 10 mg, Oral, Q24H, Hank Joaquin MD, 10 mg at 02/12/18 0802  •  arformoterol (BROVANA) nebulizer solution 15 mcg, 15 mcg, Nebulization, BID - RT, Tony Mccauley MD, 15 mcg at 02/09/18 0856  •  aspirin chewable tablet 81 mg, 81 mg, Oral, Daily, Hank Joaquin MD, 81 mg at 02/12/18 0802  •  budesonide (PULMICORT) nebulizer solution 0.25 mg, 0.25 mg, Nebulization, BID - RT, Tony Mccauley MD, 0.25 mg at 02/09/18 0856  •  bumetanide (BUMEX) tablet 2 mg, 2 mg, Oral, BID, Best Fletcher MD, 2 mg at 02/12/18 0802  •  buPROPion SR (WELLBUTRIN SR) 12 hr tablet 200 mg, 200 mg, Oral, Q12H, Hank Joaquin MD, 200 mg at 02/12/18 0802  •  ceFAZolin in dextrose (ANCEF) IVPB solution 2 g, 2 g, Intravenous, Once, Nitin Coronel MD  •  cholecalciferol (VITAMIN D3) tablet 1,000 Units, 1,000 Units, Oral, Daily, Hank Joaquin MD, 1,000 Units at 02/12/18 0802  •  citalopram (CeleXA) tablet 20 mg, 20 mg, Oral, Daily, Hank Joaquin MD, 20 mg at 02/12/18 0802  •  CloNIDine (CATAPRES) tablet 0.2 mg, 0.2 mg, Oral, Q12H, Hank Joaquin MD, 0.2 mg at 02/12/18 0802  •  dextrose (D50W) solution 25 g, 25 g, Intravenous, Q15 Min PRN, Danilo Del Valle MD  •  dextrose (GLUTOSE) oral gel 15 g, 15 g, Oral, Q15 Min PRN, Danilo Del Valle MD  •  epoetin chloe (EPOGEN,PROCRIT) injection 20,000 Units, 20,000 Units, Subcutaneous, Weekly, Hank Joaquin MD, 20,000 Units at 02/06/18 1844  •  gabapentin (NEURONTIN) capsule 300 mg, 300 mg, Oral, Q12H, Hank Joaquin MD, 300 mg at 02/12/18 0802  •  glucagon (human recombinant) (GLUCAGEN DIAGNOSTIC) injection 1 mg, 1 mg, Subcutaneous, PRN, Danilo NARANJO  MD Eligio  •  heparin (porcine) 5000 UNIT/ML injection 5,000 Units, 5,000 Units, Subcutaneous, Q12H, Best Fletcher MD, 5,000 Units at 02/12/18 0802  •  heparin (porcine) injection 3,000 Units, 3,000 Units, Intracatheter, Once, Tutu Smith MD  •  hydrALAZINE (APRESOLINE) tablet 100 mg, 100 mg, Oral, Q8H, Best Fletcher MD, 100 mg at 02/12/18 0611  •  hydrocerin (EUCERIN) cream, , Topical, Daily, He Strange MD, 1 application at 02/12/18 0807  •  HYDROcodone-acetaminophen (NORCO) 5-325 MG per tablet 1 tablet, 1 tablet, Oral, Q4H PRN, Hank Joaquin MD, 1 tablet at 02/12/18 0802  •  insulin aspart (novoLOG) injection 0-9 Units, 0-9 Units, Subcutaneous, 4x Daily With Meals & Nightly, Danilo Del Valle MD, 4 Units at 02/11/18 2235  •  insulin detemir (LEVEMIR) injection 10 Units, 10 Units, Subcutaneous, QAM, Shruti Porras MD, 10 Units at 02/12/18 0800  •  insulin detemir (LEVEMIR) injection 22 Units, 22 Units, Subcutaneous, Nightly, Shruti Porras MD, 22 Units at 02/11/18 2235  •  ipratropium-albuterol (DUO-NEB) nebulizer solution 3 mL, 3 mL, Nebulization, Q6H While Awake - RT, He Strange MD, 3 mL at 02/09/18 1501  •  metoprolol tartrate (LOPRESSOR) tablet 50 mg, 50 mg, Oral, Q12H, Hank Joaquin MD, 50 mg at 02/12/18 0802  •  pantoprazole (PROTONIX) EC tablet 40 mg, 40 mg, Oral, BID AC, Danilo Hensley MD, 40 mg at 02/12/18 0657  •  risperiDONE (risperDAL) tablet 2 mg, 2 mg, Oral, Nightly, Hank Joaquin MD, 2 mg at 02/11/18 2135  •  sodium bicarbonate tablet 650 mg, 650 mg, Oral, TID, Hank Joaquin MD, 650 mg at 02/12/18 0802  •  tamsulosin (FLOMAX) 24 hr capsule 0.4 mg, 0.4 mg, Oral, Nightly, Hank Joaquin MD, 0.4 mg at 02/11/18 2134  •  traMADol (ULTRAM) tablet 50 mg, 50 mg, Oral, Q6H PRN, Schuyler Wilfrid Saavedra Jr., MD, 50 mg at 02/12/18 0457  •  traZODone (DESYREL) tablet 100 mg, 100 mg, Oral, Nightly, Hank Joaquin MD, 100 mg  at 02/11/18 2134       Assesment  End-stage renal disease, initiated on hemodialysis  Anemia of chronic disease with iron deficiency  Lower Extremity edema, better with dialysis/ultrafiltration  Diabetes mellitus  Multiple skin ulcers, no antibiotics or vascular intervention needed per consultants  Decubitus ulcers  Deconditioning, physical therapy following  COPD  Hypertension  Tobacco abuse  Peripheral vascular disease  Erosive esophagitis            Plan:  Tachycardia 120's Afib per RN this am get an EKG tele monitor check TSH cardiology consult  Appreciate all consultants input  Check bp outliers manually by RN  Continue diuretic   Continue TIW HD for HD today  Awaiting precert for rehab  chair in [facility] unit starting 2/14/2018  d/c home when ok with all consultants  Get a 24 hour creatinine clearance seems to have recovered some function and gfr 20 and increase bumex to 3 mg po bid to see if can manage fluid with diuretics alone till he gets functional AVF or AVG  D/w patient        Best Fletcher MD  02/12/18  8:33 AM  Tel: [phone]  Fax: [phone]       Electronically signed by Best Fletcher MD at 2/12/2018  8:46 AM

## 2018-02-13 ENCOUNTER — APPOINTMENT (OUTPATIENT)
Dept: CARDIOLOGY | Facility: HOSPITAL | Age: 68
End: 2018-02-13
Attending: INTERNAL MEDICINE

## 2018-02-13 VITALS
BODY MASS INDEX: 30.96 KG/M2 | OXYGEN SATURATION: 97 % | HEIGHT: 72 IN | TEMPERATURE: 97.6 F | WEIGHT: 228.6 LBS | HEART RATE: 66 BPM | DIASTOLIC BLOOD PRESSURE: 70 MMHG | SYSTOLIC BLOOD PRESSURE: 146 MMHG | RESPIRATION RATE: 18 BRPM

## 2018-02-13 LAB
ALBUMIN SERPL-MCNC: 2 G/DL (ref 3.5–5.2)
ANION GAP SERPL CALCULATED.3IONS-SCNC: 9.2 MMOL/L
BASOPHILS # BLD AUTO: 0.02 10*3/MM3 (ref 0–0.2)
BASOPHILS NFR BLD AUTO: 0.3 % (ref 0–1.5)
BH CV ECHO MEAS - ACS: 1.8 CM
BH CV ECHO MEAS - AO MAX PG: 11.6 MMHG
BH CV ECHO MEAS - AO MEAN PG (FULL): 4 MMHG
BH CV ECHO MEAS - AO MEAN PG: 6 MMHG
BH CV ECHO MEAS - AO ROOT AREA (BSA CORRECTED): 1.5
BH CV ECHO MEAS - AO ROOT AREA: 8.6 CM^2
BH CV ECHO MEAS - AO ROOT DIAM: 3.3 CM
BH CV ECHO MEAS - AO V2 MAX: 170.5 CM/SEC
BH CV ECHO MEAS - AO V2 MEAN: 109 CM/SEC
BH CV ECHO MEAS - AO V2 VTI: 35.2 CM
BH CV ECHO MEAS - AVA(I,A): 2.3 CM^2
BH CV ECHO MEAS - AVA(I,D): 2.3 CM^2
BH CV ECHO MEAS - BSA(HAYCOCK): 2.3 M^2
BH CV ECHO MEAS - BSA: 2.3 M^2
BH CV ECHO MEAS - BZI_BMI: 30.9 KILOGRAMS/M^2
BH CV ECHO MEAS - BZI_METRIC_HEIGHT: 182.9 CM
BH CV ECHO MEAS - BZI_METRIC_WEIGHT: 103.4 KG
BH CV ECHO MEAS - CONTRAST EF (2CH): 63.2 ML/M^2
BH CV ECHO MEAS - CONTRAST EF 4CH: 57.6 ML/M^2
BH CV ECHO MEAS - EDV(CUBED): 166.4 ML
BH CV ECHO MEAS - EDV(MOD-SP2): 152 ML
BH CV ECHO MEAS - EDV(MOD-SP4): 184 ML
BH CV ECHO MEAS - EDV(TEICH): 147.4 ML
BH CV ECHO MEAS - EF(CUBED): 74.2 %
BH CV ECHO MEAS - EF(MOD-SP2): 63.2 %
BH CV ECHO MEAS - EF(MOD-SP4): 57.6 %
BH CV ECHO MEAS - EF(TEICH): 65.5 %
BH CV ECHO MEAS - ESV(CUBED): 42.9 ML
BH CV ECHO MEAS - ESV(MOD-SP2): 56 ML
BH CV ECHO MEAS - ESV(MOD-SP4): 78 ML
BH CV ECHO MEAS - ESV(TEICH): 50.9 ML
BH CV ECHO MEAS - FS: 36.4 %
BH CV ECHO MEAS - IVS/LVPW: 1
BH CV ECHO MEAS - IVSD: 1.1 CM
BH CV ECHO MEAS - LAT PEAK E' VEL: 7 CM/SEC
BH CV ECHO MEAS - LV DIASTOLIC VOL/BSA (35-75): 81.7 ML/M^2
BH CV ECHO MEAS - LV MASS(C)D: 242 GRAMS
BH CV ECHO MEAS - LV MASS(C)DI: 107.4 GRAMS/M^2
BH CV ECHO MEAS - LV MAX PG: 5.5 MMHG
BH CV ECHO MEAS - LV MEAN PG: 2 MMHG
BH CV ECHO MEAS - LV SYSTOLIC VOL/BSA (12-30): 34.6 ML/M^2
BH CV ECHO MEAS - LV V1 MAX: 117 CM/SEC
BH CV ECHO MEAS - LV V1 MEAN: 65.1 CM/SEC
BH CV ECHO MEAS - LV V1 VTI: 23.4 CM
BH CV ECHO MEAS - LVIDD: 5.5 CM
BH CV ECHO MEAS - LVIDS: 3.5 CM
BH CV ECHO MEAS - LVLD AP2: 8.9 CM
BH CV ECHO MEAS - LVLD AP4: 10.2 CM
BH CV ECHO MEAS - LVLS AP2: 7.2 CM
BH CV ECHO MEAS - LVLS AP4: 8.9 CM
BH CV ECHO MEAS - LVOT AREA (M): 3.5 CM^2
BH CV ECHO MEAS - LVOT AREA: 3.5 CM^2
BH CV ECHO MEAS - LVOT DIAM: 2.1 CM
BH CV ECHO MEAS - LVPWD: 1.1 CM
BH CV ECHO MEAS - MED PEAK E' VEL: 7 CM/SEC
BH CV ECHO MEAS - MR MAX PG: 107.1 MMHG
BH CV ECHO MEAS - MR MAX VEL: 517.5 CM/SEC
BH CV ECHO MEAS - MV A DUR: 0.12 SEC
BH CV ECHO MEAS - MV A MAX VEL: 92.7 CM/SEC
BH CV ECHO MEAS - MV DEC SLOPE: 907 CM/SEC^2
BH CV ECHO MEAS - MV DEC TIME: 0.16 SEC
BH CV ECHO MEAS - MV E MAX VEL: 119 CM/SEC
BH CV ECHO MEAS - MV E/A: 1.3
BH CV ECHO MEAS - MV MEAN PG: 3 MMHG
BH CV ECHO MEAS - MV P1/2T MAX VEL: 156 CM/SEC
BH CV ECHO MEAS - MV P1/2T: 50.4 MSEC
BH CV ECHO MEAS - MV V2 MEAN: 74.6 CM/SEC
BH CV ECHO MEAS - MV V2 VTI: 38.6 CM
BH CV ECHO MEAS - MVA P1/2T LCG: 1.4 CM^2
BH CV ECHO MEAS - MVA(P1/2T): 4.4 CM^2
BH CV ECHO MEAS - MVA(VTI): 2.1 CM^2
BH CV ECHO MEAS - PA ACC SLOPE: 40.3 CM/SEC^2
BH CV ECHO MEAS - PA ACC TIME: 0.12 SEC
BH CV ECHO MEAS - PA MAX PG: 6.6 MMHG
BH CV ECHO MEAS - PA PR(ACCEL): 25 MMHG
BH CV ECHO MEAS - PA V2 MAX: 128 CM/SEC
BH CV ECHO MEAS - PULM A REVS DUR: 0.14 SEC
BH CV ECHO MEAS - PULM A REVS VEL: 27.4 CM/SEC
BH CV ECHO MEAS - PULM DIAS VEL: 42 CM/SEC
BH CV ECHO MEAS - PULM S/D: 1.3
BH CV ECHO MEAS - PULM SYS VEL: 52.7 CM/SEC
BH CV ECHO MEAS - QP/QS: 1.2
BH CV ECHO MEAS - RAP SYSTOLE: 15 MMHG
BH CV ECHO MEAS - RV MEAN PG: 2 MMHG
BH CV ECHO MEAS - RV V1 MEAN: 68.4 CM/SEC
BH CV ECHO MEAS - RV V1 VTI: 23.1 CM
BH CV ECHO MEAS - RVOT AREA: 4.2 CM^2
BH CV ECHO MEAS - RVOT DIAM: 2.3 CM
BH CV ECHO MEAS - RVSP: 49 MMHG
BH CV ECHO MEAS - SI(AO): 133.7 ML/M^2
BH CV ECHO MEAS - SI(CUBED): 54.8 ML/M^2
BH CV ECHO MEAS - SI(LVOT): 36 ML/M^2
BH CV ECHO MEAS - SI(MOD-SP2): 42.6 ML/M^2
BH CV ECHO MEAS - SI(MOD-SP4): 47.1 ML/M^2
BH CV ECHO MEAS - SI(TEICH): 42.9 ML/M^2
BH CV ECHO MEAS - SV(AO): 301.1 ML
BH CV ECHO MEAS - SV(CUBED): 123.5 ML
BH CV ECHO MEAS - SV(LVOT): 81 ML
BH CV ECHO MEAS - SV(MOD-SP2): 96 ML
BH CV ECHO MEAS - SV(MOD-SP4): 106 ML
BH CV ECHO MEAS - SV(RVOT): 96 ML
BH CV ECHO MEAS - SV(TEICH): 96.6 ML
BH CV ECHO MEAS - TAPSE (>1.6): 3.4 CM2
BH CV ECHO MEAS - TR MAX V: 34 MMHG
BH CV ECHO MEAS - TR MAX VEL: 290 CM/SEC
BH CV VAS BP RIGHT ARM: NORMAL MMHG
BH CV XLRA - RV BASE: 4.2 CM
BH CV XLRA - TDI S': 14 CM/SEC
BUN BLD-MCNC: 25 MG/DL (ref 8–23)
BUN/CREAT SERPL: 11.1 (ref 7–25)
CALCIUM SPEC-SCNC: 7.6 MG/DL (ref 8.6–10.5)
CHLORIDE SERPL-SCNC: 100 MMOL/L (ref 98–107)
CO2 SERPL-SCNC: 28.8 MMOL/L (ref 22–29)
COLLECT DURATION TIME UR: 17 HRS
COLLECT DURATION TIME UR: 17 HRS
CREAT BLD-MCNC: 2.25 MG/DL (ref 0.76–1.27)
CREAT UR-MCNC: 19.3 MG/DL
CREATINE 24H UR-MRATE: 0.71 G/24 HR (ref 1–2.4)
DEPRECATED RDW RBC AUTO: 54.5 FL (ref 37–54)
E/E' RATIO: 16.5
EOSINOPHIL # BLD AUTO: 0.11 10*3/MM3 (ref 0–0.7)
EOSINOPHIL NFR BLD AUTO: 1.9 % (ref 0.3–6.2)
ERYTHROCYTE [DISTWIDTH] IN BLOOD BY AUTOMATED COUNT: 15.9 % (ref 11.5–14.5)
GFR SERPL CREATININE-BSD FRML MDRD: 29 ML/MIN/1.73
GLUCOSE BLD-MCNC: 80 MG/DL (ref 65–99)
GLUCOSE BLDC GLUCOMTR-MCNC: 108 MG/DL (ref 70–130)
GLUCOSE BLDC GLUCOMTR-MCNC: 88 MG/DL (ref 70–130)
HCT VFR BLD AUTO: 28.9 % (ref 40.4–52.2)
HGB BLD-MCNC: 8.6 G/DL (ref 13.7–17.6)
IMM GRANULOCYTES # BLD: 0.08 10*3/MM3 (ref 0–0.03)
IMM GRANULOCYTES NFR BLD: 1.4 % (ref 0–0.5)
INR PPP: 1.14 (ref 0.9–1.1)
LEFT ATRIUM VOLUME INDEX: 36 ML/M2
LYMPHOCYTES # BLD AUTO: 1.33 10*3/MM3 (ref 0.9–4.8)
LYMPHOCYTES NFR BLD AUTO: 23 % (ref 19.6–45.3)
MCH RBC QN AUTO: 28.1 PG (ref 27–32.7)
MCHC RBC AUTO-ENTMCNC: 29.8 G/DL (ref 32.6–36.4)
MCV RBC AUTO: 94.4 FL (ref 79.8–96.2)
MONOCYTES # BLD AUTO: 0.59 10*3/MM3 (ref 0.2–1.2)
MONOCYTES NFR BLD AUTO: 10.2 % (ref 5–12)
NEUTROPHILS # BLD AUTO: 3.66 10*3/MM3 (ref 1.9–8.1)
NEUTROPHILS NFR BLD AUTO: 63.2 % (ref 42.7–76)
PHOSPHATE SERPL-MCNC: 2.2 MG/DL (ref 2.5–4.5)
PLATELET # BLD AUTO: 242 10*3/MM3 (ref 140–500)
PMV BLD AUTO: 10.9 FL (ref 6–12)
POTASSIUM BLD-SCNC: 3.7 MMOL/L (ref 3.5–5.2)
PROT 24H UR-MRATE: 6327 MG/24HOURS (ref 0–150)
PROT UR-MCNC: 171 MG/DL
PROTHROMBIN TIME: 14.4 SECONDS (ref 11.7–14.2)
RBC # BLD AUTO: 3.06 10*6/MM3 (ref 4.6–6)
SODIUM BLD-SCNC: 138 MMOL/L (ref 136–145)
SPECIMEN VOL 24H UR: 3700 ML
SPECIMEN VOL 24H UR: 3700 ML
WBC NRBC COR # BLD: 5.79 10*3/MM3 (ref 4.5–10.7)

## 2018-02-13 PROCEDURE — 80069 RENAL FUNCTION PANEL: CPT | Performed by: INTERNAL MEDICINE

## 2018-02-13 PROCEDURE — 81050 URINALYSIS VOLUME MEASURE: CPT | Performed by: INTERNAL MEDICINE

## 2018-02-13 PROCEDURE — 25010000002 HEPARIN (PORCINE) PER 1000 UNITS: Performed by: INTERNAL MEDICINE

## 2018-02-13 PROCEDURE — 82570 ASSAY OF URINE CREATININE: CPT | Performed by: INTERNAL MEDICINE

## 2018-02-13 PROCEDURE — 63510000001 EPOETIN ALFA PER 1000 UNITS: Performed by: INTERNAL MEDICINE

## 2018-02-13 PROCEDURE — 93306 TTE W/DOPPLER COMPLETE: CPT | Performed by: INTERNAL MEDICINE

## 2018-02-13 PROCEDURE — 84156 ASSAY OF PROTEIN URINE: CPT | Performed by: INTERNAL MEDICINE

## 2018-02-13 PROCEDURE — 85025 COMPLETE CBC W/AUTO DIFF WBC: CPT | Performed by: INTERNAL MEDICINE

## 2018-02-13 PROCEDURE — 82962 GLUCOSE BLOOD TEST: CPT

## 2018-02-13 PROCEDURE — 93306 TTE W/DOPPLER COMPLETE: CPT

## 2018-02-13 PROCEDURE — 87070 CULTURE OTHR SPECIMN AEROBIC: CPT | Performed by: INTERNAL MEDICINE

## 2018-02-13 PROCEDURE — 87205 SMEAR GRAM STAIN: CPT | Performed by: INTERNAL MEDICINE

## 2018-02-13 PROCEDURE — 99233 SBSQ HOSP IP/OBS HIGH 50: CPT | Performed by: INTERNAL MEDICINE

## 2018-02-13 PROCEDURE — 87186 SC STD MICRODIL/AGAR DIL: CPT | Performed by: INTERNAL MEDICINE

## 2018-02-13 PROCEDURE — 85610 PROTHROMBIN TIME: CPT | Performed by: INTERNAL MEDICINE

## 2018-02-13 RX ORDER — BUDESONIDE 0.25 MG/2ML
0.25 INHALANT ORAL
Qty: 4 ML | Refills: 1 | Status: SHIPPED | OUTPATIENT
Start: 2018-02-13 | End: 2018-04-12

## 2018-02-13 RX ORDER — BUMETANIDE 1 MG/1
3 TABLET ORAL 2 TIMES DAILY
Qty: 180 TABLET | Refills: 0 | Status: SHIPPED | OUTPATIENT
Start: 2018-02-13 | End: 2018-04-12

## 2018-02-13 RX ORDER — WARFARIN SODIUM 5 MG/1
5 TABLET ORAL
Status: COMPLETED | OUTPATIENT
Start: 2018-02-13 | End: 2018-02-13

## 2018-02-13 RX ORDER — ARFORMOTEROL TARTRATE 15 UG/2ML
15 SOLUTION RESPIRATORY (INHALATION)
Qty: 120 ML | Refills: 0 | Status: SHIPPED | OUTPATIENT
Start: 2018-02-13 | End: 2018-04-12

## 2018-02-13 RX ORDER — HYDRALAZINE HYDROCHLORIDE 100 MG/1
100 TABLET, FILM COATED ORAL EVERY 8 HOURS SCHEDULED
Qty: 90 TABLET | Refills: 0 | Status: SHIPPED | OUTPATIENT
Start: 2018-02-13 | End: 2018-04-12

## 2018-02-13 RX ADMIN — HYDRALAZINE HYDROCHLORIDE 100 MG: 50 TABLET, FILM COATED ORAL at 16:06

## 2018-02-13 RX ADMIN — HYDROCODONE BITARTRATE AND ACETAMINOPHEN 1 TABLET: 5; 325 TABLET ORAL at 07:29

## 2018-02-13 RX ADMIN — VITAMIN D, TAB 1000IU (100/BT) 1000 UNITS: 25 TAB at 08:40

## 2018-02-13 RX ADMIN — BUPROPION HYDROCHLORIDE 200 MG: 100 TABLET, EXTENDED RELEASE ORAL at 08:41

## 2018-02-13 RX ADMIN — AMLODIPINE BESYLATE 10 MG: 10 TABLET ORAL at 08:40

## 2018-02-13 RX ADMIN — HYDROCODONE BITARTRATE AND ACETAMINOPHEN 1 TABLET: 5; 325 TABLET ORAL at 11:43

## 2018-02-13 RX ADMIN — ERYTHROPOIETIN 20000 UNITS: 20000 INJECTION, SOLUTION INTRAVENOUS; SUBCUTANEOUS at 11:43

## 2018-02-13 RX ADMIN — HYDRALAZINE HYDROCHLORIDE 100 MG: 50 TABLET, FILM COATED ORAL at 08:57

## 2018-02-13 RX ADMIN — SODIUM BICARBONATE 650 MG: 650 TABLET ORAL at 16:06

## 2018-02-13 RX ADMIN — Medication: at 08:46

## 2018-02-13 RX ADMIN — PANTOPRAZOLE SODIUM 40 MG: 40 TABLET, DELAYED RELEASE ORAL at 07:29

## 2018-02-13 RX ADMIN — METOPROLOL TARTRATE 50 MG: 50 TABLET, FILM COATED ORAL at 08:40

## 2018-02-13 RX ADMIN — HYDROCODONE BITARTRATE AND ACETAMINOPHEN 1 TABLET: 5; 325 TABLET ORAL at 02:46

## 2018-02-13 RX ADMIN — ASPIRIN 81 MG: 81 TABLET, CHEWABLE ORAL at 08:40

## 2018-02-13 RX ADMIN — HEPARIN SODIUM 5000 UNITS: 5000 INJECTION, SOLUTION INTRAVENOUS; SUBCUTANEOUS at 08:43

## 2018-02-13 RX ADMIN — CITALOPRAM 20 MG: 20 TABLET, FILM COATED ORAL at 08:40

## 2018-02-13 RX ADMIN — GABAPENTIN 300 MG: 300 CAPSULE ORAL at 08:41

## 2018-02-13 RX ADMIN — SODIUM BICARBONATE 650 MG: 650 TABLET ORAL at 08:40

## 2018-02-13 RX ADMIN — BUMETANIDE 3 MG: 2 TABLET ORAL at 08:40

## 2018-02-13 RX ADMIN — WARFARIN SODIUM 5 MG: 5 TABLET ORAL at 16:07

## 2018-02-13 RX ADMIN — CLONIDINE HYDROCHLORIDE 0.2 MG: 0.1 TABLET ORAL at 08:40

## 2018-02-13 RX ADMIN — INSULIN DETEMIR 10 UNITS: 100 INJECTION, SOLUTION SUBCUTANEOUS at 08:40

## 2018-02-13 NOTE — DISCHARGE SUMMARY
Date of Discharge:  2/13/2018    Discharge Diagnosis: ESRD    Presenting Problem/History of Present Illness  Acute renal failure (ARF) [N17.9]       Hospital Course  Patient is a 67 y.o. male presented with abnormal labs and was initiated on dialysis.  He had tunneled dialysis catheter placed.  He tolerated dialysis well.  He was seen by vascular surgery for his peripheral arterial disease also.  His diabetes was managed by medicine without medical issues.    He developed yesterday morning atrial fibrillation with rapid ventricular response and he was started on beta blocker with good rate control.    I discussed with Dr. Fung today and he stated that if okay with okay with GI to proceed with Coumadin with no need of heparin drip for bridging.  Noted dropping hemoglobin but gastroenterology is okay with discharge and starting Coumadin.  He is also followed by infectious disease for his multiple wounds during this hospital stay and no antibiotics were given as it was deemed that the wounds are not infected.  He was also seen by pulmonary for his COPD and continued on his home CPAP.  Another 24 hour collection for urine can be performed as outpatient in the dialysis unit number is attending nephrologist worked for partial recovery of renal function.  Department of vascular surgery can be set up as outpatient.  Lipid lowering med to be started by cardiology.  Dosing of coumadin per MD in rehab then per cardiology prescription written   D/w charge nurse at rehab facility and PT INR will be checked in am tomorrow and rehab MD will continue coumadin dosing tomorrow .Goal INR 2-3.  Goals     None           Follow-up with cardiology    Gastroenterology    Continue wound care in the rehabilitation unit            Procedures Performed  Procedure(s):  PALINDROME INSERTION       Consults:   Consults     Date and Time Order Name Status Description    2/12/2018 0841 Inpatient Consult to Cardiology Completed     2/6/2018 2834  Inpatient Consult to Vascular Surgery Completed     2/6/2018 0905 Inpatient Consult to Pulmonology Completed     2/6/2018 0750 Inpatient Consult to Gastroenterology Completed     2/5/2018 2002 Inpatient Consult to Infectious Diseases Completed     1/20/2018 0848 Inpatient Consult to General Surgery Completed     1/18/2018 0920 Inpatient Consult to Nephrology Completed     1/17/2018 1900 Inpatient Consult to Gastroenterology Completed     1/17/2018 0441 Inpatient Consult to Infectious Diseases Completed           Pertinent Test Results:   Results for orders placed or performed during the hospital encounter of 02/05/18   Respiratory Panel, PCR - Swab, Nasopharynx   Result Value Ref Range    ADENOVIRUS, PCR Not Detected Not Detected    Coronavirus 229E Not Detected Not Detected    Coronavirus HKU1 Not Detected Not Detected    Coronavirus NL63 Not Detected Not Detected    Coronavirus OC43 Not Detected Not Detected    Human Metapneumovirus Not Detected Not Detected    Human Rhinovirus/Enterovirus Not Detected Not Detected    Influenza B PCR Not Detected Not Detected    Parainfluenza Virus 1 Not Detected Not Detected    Parainfluenza Virus 2 Not Detected Not Detected    Parainfluenza Virus 3 Not Detected Not Detected    Parainfluenza Virus 4 Not Detected Not Detected    Bordetella pertussis pcr Not Detected Not Detected    Influenza A H1N1 2009 PCR Not Detected Not Detected    Chlamydophila pneumoniae PCR Not Detected Not Detected    Mycoplasma pneumo by PCR Not Detected Not Detected    Influenza A PCR Not Detected Not Detected    Influenza A H3 Not Detected Not Detected    Influenza A H1 Not Detected Not Detected    RSV, PCR Not Detected Not Detected   Urinalysis - Urine, Clean Catch   Result Value Ref Range    Color, UA Yellow Yellow, Straw    Appearance, UA Clear Clear    pH, UA 5.5 5.0 - 8.0    Specific Gravity, UA 1.013 1.005 - 1.030    Glucose,  mg/dL (2+) (A) Negative    Ketones, UA Negative Negative     Bilirubin, UA Negative Negative    Blood, UA Negative Negative    Protein,  mg/dL (2+) (A) Negative    Leuk Esterase, UA Negative Negative    Nitrite, UA Negative Negative    Urobilinogen, UA 0.2 E.U./dL 0.2 - 1.0 E.U./dL   Hemoglobin A1c   Result Value Ref Range    Hemoglobin A1C 10.60 (H) 4.80 - 5.60 %   Basic Metabolic Panel   Result Value Ref Range    Glucose 258 (H) 65 - 99 mg/dL    BUN 63 (H) 8 - 23 mg/dL    Creatinine 3.85 (H) 0.76 - 1.27 mg/dL    Sodium 135 (L) 136 - 145 mmol/L    Potassium 4.9 3.5 - 5.2 mmol/L    Chloride 100 98 - 107 mmol/L    CO2 22.6 22.0 - 29.0 mmol/L    Calcium 8.3 (L) 8.6 - 10.5 mg/dL    eGFR Non African Amer 16 (L) >60 mL/min/1.73    BUN/Creatinine Ratio 16.4 7.0 - 25.0    Anion Gap 12.4 mmol/L   Phosphorus   Result Value Ref Range    Phosphorus 6.7 (H) 2.5 - 4.5 mg/dL   Iron Profile   Result Value Ref Range    Iron 14 (L) 59 - 158 mcg/dL    Iron Saturation 7 (L) 20 - 50 %    Transferrin 126 (L) 200 - 360 mg/dL    TIBC 188 mcg/dL   Magnesium   Result Value Ref Range    Magnesium 2.1 1.6 - 2.4 mg/dL   CBC Auto Differential   Result Value Ref Range    WBC 7.48 4.50 - 10.70 10*3/mm3    RBC 2.54 (L) 4.60 - 6.00 10*6/mm3    Hemoglobin 6.9 (C) 13.7 - 17.6 g/dL    Hematocrit 23.1 (L) 40.4 - 52.2 %    MCV 90.9 79.8 - 96.2 fL    MCH 27.2 27.0 - 32.7 pg    MCHC 29.9 (L) 32.6 - 36.4 g/dL    RDW 15.1 (H) 11.5 - 14.5 %    RDW-SD 49.5 37.0 - 54.0 fl    MPV 11.1 6.0 - 12.0 fL    Platelets 266 140 - 500 10*3/mm3    Neutrophil % 76.3 (H) 42.7 - 76.0 %    Lymphocyte % 14.2 (L) 19.6 - 45.3 %    Monocyte % 6.0 5.0 - 12.0 %    Eosinophil % 1.5 0.3 - 6.2 %    Basophil % 0.7 0.0 - 1.5 %    Immature Grans % 1.3 (H) 0.0 - 0.5 %    Neutrophils, Absolute 5.71 1.90 - 8.10 10*3/mm3    Lymphocytes, Absolute 1.06 0.90 - 4.80 10*3/mm3    Monocytes, Absolute 0.45 0.20 - 1.20 10*3/mm3    Eosinophils, Absolute 0.11 0.00 - 0.70 10*3/mm3    Basophils, Absolute 0.05 0.00 - 0.20 10*3/mm3    Immature Grans, Absolute  0.10 (H) 0.00 - 0.03 10*3/mm3    nRBC 0.0 0.0 - 0.0 /100 WBC   Hemoglobin & Hematocrit, Blood   Result Value Ref Range    Hemoglobin 6.8 (C) 13.7 - 17.6 g/dL    Hematocrit 22.9 (L) 40.4 - 52.2 %   Blood Gas, Arterial   Result Value Ref Range    Site Arterial: right radial     Loi's Test Positive     pH, Arterial 7.299 (C) 7.350 - 7.450 pH units    pCO2, Arterial 52.7 (H) 35.0 - 45.0 mm Hg    pO2, Arterial 67.4 (L) 80.0 - 100.0 mm Hg    HCO3, Arterial 25.9 22.0 - 28.0 mmol/L    Base Excess, Arterial -0.6 (L) 0.0 - 2.0 mmol/L    O2 Saturation Calculated 90.6 (L) 92.0 - 99.0 %    Barometric Pressure for Blood Gas 755.3 mmHg    Modality Cannula     Flow Rate 3 lpm    Rate 16 Breaths/minute   Blood Gas, Arterial   Result Value Ref Range    Site Arterial: right radial     Loi's Test Positive     pH, Arterial 7.314 (L) 7.350 - 7.450 pH units    pCO2, Arterial 52.2 (H) 35.0 - 45.0 mm Hg    pO2, Arterial 61.5 (L) 80.0 - 100.0 mm Hg    HCO3, Arterial 26.5 22.0 - 28.0 mmol/L    Base Excess, Arterial 0.3 0.0 - 2.0 mmol/L    O2 Saturation Calculated 88.6 (L) 92.0 - 99.0 %    A-a Gradiant 0.3 mmHg    Barometric Pressure for Blood Gas 756.3 mmHg    Modality BiPap     FIO2 35 %    Set [facility] Resp Rate 20     Rate 23 Breaths/minute   Hemoglobin & Hematocrit, Blood   Result Value Ref Range    Hemoglobin 8.2 (L) 13.7 - 17.6 g/dL    Hematocrit 26.5 (L) 40.4 - 52.2 %   Hepatitis B Surface Antigen   Result Value Ref Range    Hepatitis B Surface Ag Non-Reactive Non-Reactive   Blood Gas, Arterial   Result Value Ref Range    Site Arterial: right radial     Loi's Test Positive     pH, Arterial 7.385 7.350 - 7.450 pH units    pCO2, Arterial 42.1 35.0 - 45.0 mm Hg    pO2, Arterial 71.8 (L) 80.0 - 100.0 mm Hg    HCO3, Arterial 25.2 22.0 - 28.0 mmol/L    Base Excess, Arterial 0.1 0.0 - 2.0 mmol/L    O2 Saturation Calculated 93.9 92.0 - 99.0 %    A-a Gradiant 0.3 mmHg    Barometric Pressure for Blood Gas 758.6 mmHg    Modality BiPap     FIO2  35 %    Set [facility] Resp Rate 20     Rate 22 Breaths/minute   Hemoglobin & Hematocrit, Blood   Result Value Ref Range    Hemoglobin 8.2 (L) 13.7 - 17.6 g/dL    Hematocrit 26.9 (L) 40.4 - 52.2 %   Renal Function Panel   Result Value Ref Range    Glucose 98 65 - 99 mg/dL    BUN 29 (H) 8 - 23 mg/dL    Creatinine 2.40 (H) 0.76 - 1.27 mg/dL    Sodium 139 136 - 145 mmol/L    Potassium 3.9 3.5 - 5.2 mmol/L    Chloride 102 98 - 107 mmol/L    CO2 24.3 22.0 - 29.0 mmol/L    Calcium 8.0 (L) 8.6 - 10.5 mg/dL    Albumin 2.20 (L) 3.50 - 5.20 g/dL    Phosphorus 3.8 2.5 - 4.5 mg/dL    Anion Gap 12.7 mmol/L    BUN/Creatinine Ratio 12.1 7.0 - 25.0    eGFR Non African Amer 27 (L) >60 mL/min/1.73   Renal Function Panel   Result Value Ref Range    Glucose 111 (H) 65 - 99 mg/dL    BUN 36 (H) 8 - 23 mg/dL    Creatinine 2.78 (H) 0.76 - 1.27 mg/dL    Sodium 138 136 - 145 mmol/L    Potassium 3.9 3.5 - 5.2 mmol/L    Chloride 102 98 - 107 mmol/L    CO2 27.5 22.0 - 29.0 mmol/L    Calcium 8.0 (L) 8.6 - 10.5 mg/dL    Albumin 2.40 (L) 3.50 - 5.20 g/dL    Phosphorus 4.8 (H) 2.5 - 4.5 mg/dL    Anion Gap 8.5 mmol/L    BUN/Creatinine Ratio 12.9 7.0 - 25.0    eGFR Non African Amer 23 (L) >60 mL/min/1.73   CBC (No Diff)   Result Value Ref Range    WBC 5.73 4.50 - 10.70 10*3/mm3    RBC 2.93 (L) 4.60 - 6.00 10*6/mm3    Hemoglobin 8.0 (L) 13.7 - 17.6 g/dL    Hematocrit 26.9 (L) 40.4 - 52.2 %    MCV 91.8 79.8 - 96.2 fL    MCH 27.3 27.0 - 32.7 pg    MCHC 29.7 (L) 32.6 - 36.4 g/dL    RDW 15.0 (H) 11.5 - 14.5 %    RDW-SD 49.2 37.0 - 54.0 fl    MPV 10.7 6.0 - 12.0 fL    Platelets 239 140 - 500 10*3/mm3   Renal Function Panel   Result Value Ref Range    Glucose 88 65 - 99 mg/dL    BUN 18 8 - 23 mg/dL    Creatinine 2.01 (H) 0.76 - 1.27 mg/dL    Sodium 139 136 - 145 mmol/L    Potassium 3.8 3.5 - 5.2 mmol/L    Chloride 102 98 - 107 mmol/L    CO2 29.2 (H) 22.0 - 29.0 mmol/L    Calcium 8.2 (L) 8.6 - 10.5 mg/dL    Albumin 2.20 (L) 3.50 - 5.20 g/dL    Phosphorus 3.3 2.5 -  4.5 mg/dL    Anion Gap 7.8 mmol/L    BUN/Creatinine Ratio 9.0 7.0 - 25.0    eGFR Non African Amer 34 (L) >60 mL/min/1.73   CBC (No Diff)   Result Value Ref Range    WBC 5.01 4.50 - 10.70 10*3/mm3    RBC 2.95 (L) 4.60 - 6.00 10*6/mm3    Hemoglobin 8.0 (L) 13.7 - 17.6 g/dL    Hematocrit 27.3 (L) 40.4 - 52.2 %    MCV 92.5 79.8 - 96.2 fL    MCH 27.1 27.0 - 32.7 pg    MCHC 29.3 (L) 32.6 - 36.4 g/dL    RDW 15.1 (H) 11.5 - 14.5 %    RDW-SD 50.2 37.0 - 54.0 fl    MPV 10.4 6.0 - 12.0 fL    Platelets 239 140 - 500 10*3/mm3   Hepatitis B E Antigen   Result Value Ref Range    Hep B E Ag Negative Negative   Hepatitis B Surface Antibody   Result Value Ref Range    Hep B S Ab Reactive (A) Non-Reactive   Hepatitis B Core Antibody, Total   Result Value Ref Range    Hep B Core Total Ab Negative Negative   Renal Function Panel   Result Value Ref Range    Glucose 51 (L) 65 - 99 mg/dL    BUN 24 (H) 8 - 23 mg/dL    Creatinine 2.80 (H) 0.76 - 1.27 mg/dL    Sodium 138 136 - 145 mmol/L    Potassium 4.1 3.5 - 5.2 mmol/L    Chloride 101 98 - 107 mmol/L    CO2 29.2 (H) 22.0 - 29.0 mmol/L    Calcium 8.1 (L) 8.6 - 10.5 mg/dL    Albumin 2.20 (L) 3.50 - 5.20 g/dL    Phosphorus 3.9 2.5 - 4.5 mg/dL    Anion Gap 7.8 mmol/L    BUN/Creatinine Ratio 8.6 7.0 - 25.0    eGFR Non African Amer 23 (L) >60 mL/min/1.73   CBC Auto Differential   Result Value Ref Range    WBC 4.97 4.50 - 10.70 10*3/mm3    RBC 2.83 (L) 4.60 - 6.00 10*6/mm3    Hemoglobin 7.8 (L) 13.7 - 17.6 g/dL    Hematocrit 26.5 (L) 40.4 - 52.2 %    MCV 93.6 79.8 - 96.2 fL    MCH 27.6 27.0 - 32.7 pg    MCHC 29.4 (L) 32.6 - 36.4 g/dL    RDW 15.4 (H) 11.5 - 14.5 %    RDW-SD 51.3 37.0 - 54.0 fl    MPV 10.4 6.0 - 12.0 fL    Platelets 242 140 - 500 10*3/mm3    Neutrophil % 57.7 42.7 - 76.0 %    Lymphocyte % 28.0 19.6 - 45.3 %    Monocyte % 9.5 5.0 - 12.0 %    Eosinophil % 2.8 0.3 - 6.2 %    Basophil % 0.8 0.0 - 1.5 %    Immature Grans % 1.2 (H) 0.0 - 0.5 %    Neutrophils, Absolute 2.87 1.90 - 8.10  10*3/mm3    Lymphocytes, Absolute 1.39 0.90 - 4.80 10*3/mm3    Monocytes, Absolute 0.47 0.20 - 1.20 10*3/mm3    Eosinophils, Absolute 0.14 0.00 - 0.70 10*3/mm3    Basophils, Absolute 0.04 0.00 - 0.20 10*3/mm3    Immature Grans, Absolute 0.06 (H) 0.00 - 0.03 10*3/mm3   Renal Function Panel   Result Value Ref Range    Glucose 65 65 - 99 mg/dL    BUN 34 (H) 8 - 23 mg/dL    Creatinine 3.11 (H) 0.76 - 1.27 mg/dL    Sodium 139 136 - 145 mmol/L    Potassium 4.4 3.5 - 5.2 mmol/L    Chloride 103 98 - 107 mmol/L    CO2 26.2 22.0 - 29.0 mmol/L    Calcium 8.1 (L) 8.6 - 10.5 mg/dL    Albumin 2.60 (L) 3.50 - 5.20 g/dL    Phosphorus 3.3 2.5 - 4.5 mg/dL    Anion Gap 9.8 mmol/L    BUN/Creatinine Ratio 10.9 7.0 - 25.0    eGFR Non African Amer 20 (L) >60 mL/min/1.73   CBC (No Diff)   Result Value Ref Range    WBC 8.37 4.50 - 10.70 10*3/mm3    RBC 3.38 (L) 4.60 - 6.00 10*6/mm3    Hemoglobin 9.3 (L) 13.7 - 17.6 g/dL    Hematocrit 31.9 (L) 40.4 - 52.2 %    MCV 94.4 79.8 - 96.2 fL    MCH 27.5 27.0 - 32.7 pg    MCHC 29.2 (L) 32.6 - 36.4 g/dL    RDW 15.9 (H) 11.5 - 14.5 %    RDW-SD 53.0 37.0 - 54.0 fl    MPV 10.5 6.0 - 12.0 fL    Platelets 257 140 - 500 10*3/mm3   Renal Function Panel   Result Value Ref Range    Glucose 166 (H) 65 - 99 mg/dL    BUN 42 (H) 8 - 23 mg/dL    Creatinine 3.13 (H) 0.76 - 1.27 mg/dL    Sodium 137 136 - 145 mmol/L    Potassium 4.4 3.5 - 5.2 mmol/L    Chloride 99 98 - 107 mmol/L    CO2 25.9 22.0 - 29.0 mmol/L    Calcium 7.9 (L) 8.6 - 10.5 mg/dL    Albumin 2.40 (L) 3.50 - 5.20 g/dL    Phosphorus 3.1 2.5 - 4.5 mg/dL    Anion Gap 12.1 mmol/L    BUN/Creatinine Ratio 13.4 7.0 - 25.0    eGFR Non African Amer 20 (L) >60 mL/min/1.73   CBC (No Diff)   Result Value Ref Range    WBC 5.81 4.50 - 10.70 10*3/mm3    RBC 3.23 (L) 4.60 - 6.00 10*6/mm3    Hemoglobin 9.0 (L) 13.7 - 17.6 g/dL    Hematocrit 30.8 (L) 40.4 - 52.2 %    MCV 95.4 79.8 - 96.2 fL    MCH 27.9 27.0 - 32.7 pg    MCHC 29.2 (L) 32.6 - 36.4 g/dL    RDW 15.9 (H) 11.5  - 14.5 %    RDW-SD 54.1 (H) 37.0 - 54.0 fl    MPV 10.8 6.0 - 12.0 fL    Platelets 232 140 - 500 10*3/mm3   TSH   Result Value Ref Range    TSH 4.130 0.270 - 4.200 mIU/mL   Renal Function Panel   Result Value Ref Range    Glucose 80 65 - 99 mg/dL    BUN 25 (H) 8 - 23 mg/dL    Creatinine 2.25 (H) 0.76 - 1.27 mg/dL    Sodium 138 136 - 145 mmol/L    Potassium 3.7 3.5 - 5.2 mmol/L    Chloride 100 98 - 107 mmol/L    CO2 28.8 22.0 - 29.0 mmol/L    Calcium 7.6 (L) 8.6 - 10.5 mg/dL    Albumin 2.00 (L) 3.50 - 5.20 g/dL    Phosphorus 2.2 (L) 2.5 - 4.5 mg/dL    Anion Gap 9.2 mmol/L    BUN/Creatinine Ratio 11.1 7.0 - 25.0    eGFR Non African Amer 29 (L) >60 mL/min/1.73   CBC Auto Differential   Result Value Ref Range    WBC 5.79 4.50 - 10.70 10*3/mm3    RBC 3.06 (L) 4.60 - 6.00 10*6/mm3    Hemoglobin 8.6 (L) 13.7 - 17.6 g/dL    Hematocrit 28.9 (L) 40.4 - 52.2 %    MCV 94.4 79.8 - 96.2 fL    MCH 28.1 27.0 - 32.7 pg    MCHC 29.8 (L) 32.6 - 36.4 g/dL    RDW 15.9 (H) 11.5 - 14.5 %    RDW-SD 54.5 (H) 37.0 - 54.0 fl    MPV 10.9 6.0 - 12.0 fL    Platelets 242 140 - 500 10*3/mm3    Neutrophil % 63.2 42.7 - 76.0 %    Lymphocyte % 23.0 19.6 - 45.3 %    Monocyte % 10.2 5.0 - 12.0 %    Eosinophil % 1.9 0.3 - 6.2 %    Basophil % 0.3 0.0 - 1.5 %    Immature Grans % 1.4 (H) 0.0 - 0.5 %    Neutrophils, Absolute 3.66 1.90 - 8.10 10*3/mm3    Lymphocytes, Absolute 1.33 0.90 - 4.80 10*3/mm3    Monocytes, Absolute 0.59 0.20 - 1.20 10*3/mm3    Eosinophils, Absolute 0.11 0.00 - 0.70 10*3/mm3    Basophils, Absolute 0.02 0.00 - 0.20 10*3/mm3    Immature Grans, Absolute 0.08 (H) 0.00 - 0.03 10*3/mm3   POC Glucose Once   Result Value Ref Range    Glucose 238 (H) 70 - 130 mg/dL   POC Glucose Once   Result Value Ref Range    Glucose 254 (H) 70 - 130 mg/dL   POC Glucose Once   Result Value Ref Range    Glucose 203 (H) 70 - 130 mg/dL   POC Glucose Once   Result Value Ref Range    Glucose 155 (H) 70 - 130 mg/dL   POC Glucose Once   Result Value Ref Range     Glucose 84 70 - 130 mg/dL   POC Glucose Once   Result Value Ref Range    Glucose 89 70 - 130 mg/dL   POC Glucose Once   Result Value Ref Range    Glucose 102 70 - 130 mg/dL   POC Glucose Once   Result Value Ref Range    Glucose 111 70 - 130 mg/dL   POC Glucose Once   Result Value Ref Range    Glucose 323 (H) 70 - 130 mg/dL   POC Glucose Once   Result Value Ref Range    Glucose 345 (H) 70 - 130 mg/dL   POC Glucose Once   Result Value Ref Range    Glucose 476 (C) 70 - 130 mg/dL   POC Glucose Once   Result Value Ref Range    Glucose 435 (H) 70 - 130 mg/dL   POC Glucose Once   Result Value Ref Range    Glucose 102 70 - 130 mg/dL   POC Glucose Once   Result Value Ref Range    Glucose 80 70 - 130 mg/dL   POC Glucose Once   Result Value Ref Range    Glucose 123 70 - 130 mg/dL   POC Glucose Once   Result Value Ref Range    Glucose 135 (H) 70 - 130 mg/dL   POC Glucose Once   Result Value Ref Range    Glucose 75 70 - 130 mg/dL   POC Glucose Once   Result Value Ref Range    Glucose 242 (H) 70 - 130 mg/dL   POC Glucose Once   Result Value Ref Range    Glucose 194 (H) 70 - 130 mg/dL   POC Glucose Once   Result Value Ref Range    Glucose 167 (H) 70 - 130 mg/dL   POC Glucose Once   Result Value Ref Range    Glucose 51 (L) 70 - 130 mg/dL   POC Glucose Once   Result Value Ref Range    Glucose 67 (L) 70 - 130 mg/dL   POC Glucose Once   Result Value Ref Range    Glucose 135 (H) 70 - 130 mg/dL   POC Glucose Once   Result Value Ref Range    Glucose 262 (H) 70 - 130 mg/dL   POC Glucose Once   Result Value Ref Range    Glucose 201 (H) 70 - 130 mg/dL   POC Glucose Once   Result Value Ref Range    Glucose 133 (H) 70 - 130 mg/dL   POC Glucose Once   Result Value Ref Range    Glucose 72 70 - 130 mg/dL   POC Glucose Once   Result Value Ref Range    Glucose 107 70 - 130 mg/dL   POC Glucose Once   Result Value Ref Range    Glucose 179 (H) 70 - 130 mg/dL   POC Glucose Once   Result Value Ref Range    Glucose 215 (H) 70 - 130 mg/dL   POC Glucose  Once   Result Value Ref Range    Glucose 170 (H) 70 - 130 mg/dL   POC Glucose Once   Result Value Ref Range    Glucose 148 (H) 70 - 130 mg/dL   POC Glucose Once   Result Value Ref Range    Glucose 126 70 - 130 mg/dL   POC Glucose Once   Result Value Ref Range    Glucose 88 70 - 130 mg/dL   Type & Screen   Result Value Ref Range    ABO Type A     RH type Positive     Antibody Screen Negative    Prepare RBC, 1 Units   Result Value Ref Range    Product Code G8701I42     Unit Number [id number]     UNIT  ABO A     UNIT  RH POS     Dispense Status PT     Blood Type APOS     Blood Expiration Date [id number]     Blood Type Barcode 6200    Duplex Venous Lower Extremity - Bilateral CAR   Result Value Ref Range    Right Common Femoral Spont Y     Right Common Femoral Phasic Y     Right Common Femoral Augment Y     Right Common Femoral Competent Y     Right Common Femoral Compress C     Right Saphenofemoral Junction Spont Y     Right Saphenofemoral Junction Phasic Y     Right Saphenofemoral Junction Augment Y     Right Saphenofemoral Junction Compress C     Right Proximal Femoral Compress C     Right Mid Femoral Spont Y     Right Mid Femoral Phasic Y     Right Mid Femoral Augment Y     Right Mid Femoral Competent Y     Right Mid Femoral Compress C     Right Distal Femoral Compress C     Right Popliteal Spont Y     Right Popliteal Phasic Y     Right Popliteal Augment Y     Right Popliteal Competent Y     Right Popliteal Compress C     Right Posterior Tibial Compress C     Right Peroneal Compress C     Right GastronemiusSoleal Compress C     Right Greater Saph AK Compress C     Right Greater Saph BK Compress C     Left Common Femoral Spont Y     Left Common Femoral Phasic Y     Left Common Femoral Augment Y     Left Common Femoral Competent Y     Left Common Femoral Compress C     Left Saphenofemoral Junction Spont Y     Left Saphenofemoral Junction Phasic Y     Left Saphenofemoral Junction Augment Y     Left  Saphenofemoral Junction Compress C     Left Proximal Femoral Compress C     Left Mid Femoral Spont Y     Left Mid Femoral Phasic Y     Left Mid Femoral Augment Y     Left Mid Femoral Competent Y     Left Mid Femoral Compress C     Left Distal Femoral Compress C     Left Popliteal Spont Y     Left Popliteal Phasic Y     Left Popliteal Augment Y     Left Popliteal Competent Y     Left Popliteal Compress C     Left Posterior Tibial Compress C     Left Peroneal Compress C     Left GastronemiusSoleal Compress C     Left Greater Saph AK Compress C     Left Greater Saph BK Compress C    Duplex Initial Vein Mapping Hemodialysis Access CAR   Result Value Ref Range    Cephalic upper arm right - prox 0.21 cm    Cephalic upper arm right - mid 0.20 cm    Cephalic upper arm right - dist 0.20 cm    Cephalic Forearm right - prox 0.29 cm    Cephalic Forearm right - mid 0.24 cm    Cephalic Forearm right - dist 0.23 cm    Basilic upper arm right - prox 0.50 cm    Basilic upper arm right - mid 0.37 cm    Basilic upper arm right - dist 0.38 cm    Basilic Forearm right - prox 0.24 cm    Basilic Forearm right - mid 0.20 cm    Basilic Forearm right - dist 0.32 cm    Radial upper arm right - prox 0.29 cm    Radial upper arm right - mid 0.26 cm    Radial upper arm right - dist 0.25 cm    Cephalic upper arm left - prox 0.17 cm    Cephalic upper arm left - mid 0.12 cm    Cephalic upper arm left - dist 0.13 cm    Cephalic Forearm left - prox 0.18 cm    Cephalic Forearm left - mid 0.22 cm    Basilic upper arm left - prox 0.37 cm    Basilic upper arm left - mid 0.34 cm    Basilic upper arm left - dist 0.28 cm    Basilic Forearm left - prox 0.21 cm    Basilic Forearm left - mid 0.18 cm    Basilic Forearm left - dist 0.16 cm    Radial upper arm left - prox 0.27 cm    Radial upper arm left - mid 0.19 cm    Radial upper arm left - dist 0.25 cm    Upper arterial right arm brachial length 0.61 cm    Upper arterial left arm brachial length 0.53 cm     Basilic Antecubital Fossa Left 0.20 cm    Basilic Antecubital Fossa Right 0.27 cm    Cephalic Antecubital Fossa Left 0.22 cm    Cephalic Antecubital Fossa Right 0.28 cm   Doppler Arterial Multi Level Lower Extremity - Bilateral CAR   Result Value Ref Range    RIGHT LOW THIGH SYS  mmHg    RIGHT POPLITEAL SYS  mmHg    RIGHT DORSALIS PEDIS SYS  mmHg    RIGHT POST TIBIAL SYS  mmHg    RIGHT GREAT TOE SYS  mmHg    RIGHT FLAQUITA RATIO 1.3     RIGHT TBI RATIO 1.12     LEFT HIGH THIGH SYS  mmHg    LEFT LOW THIGH SYS  mmHg    LEFT POPLITEAL SYS  mmHg    LEFT DORSALIS PEDIS SYS  mmHg    LEFT POST TIBIAL SYS  mmHg    LEFT GREAT TOE SYS  mmHg    LEFT FLAQUITA RATIO 1.3     LEFT TBI RATIO 1.31     Upper arterial right arm brachial sys max 169 mmHg    Upper arterial left arm brachial sys max 167 mmHg       Condition on Discharge:  Stable    Vital Signs  Temp:  [97.8 °F (36.6 °C)-98.5 °F (36.9 °C)] 98.5 °F (36.9 °C)  Heart Rate:  [] 67  Resp:  [16-18] 18  BP: (134-165)/(78-96) 152/83    Physical Exam:  Constitutional : well developed ,No acute distress  ENMT lips pink oral mucosa moist   Eyes sclerae white PERRL  Respiratory: Clear to auscultation , No crackles no wheezing respirations are even and un-labored  GI: Soft, Non tender, BS active in all 4 quadrants  CVS: S1 S2 regular, no rub murmur or gallops  Skin warm dry no rashes no petechiae  Neuro grossly nonfocal cranial nerves 2-12 grossly intact sensation intact  Ext: trace LE b/l edema, Peripheral pulses intact   Heme no cervical or axillary lymphadenopathy no petechiae       Discharge Disposition to rehab      Discharge Medications   Kevin Mckeon   Home Medication Instructions NAOMY:[id number]    Printed on:02/13/18 1012   Medication Information                      acetaminophen (TYLENOL) 325 MG tablet  Take 650 mg by mouth Every 6 (Six) Hours As Needed for Mild Pain .             amLODIPine  (NORVASC) 10 MG tablet  Take 1 tablet by mouth Daily.             aspirin 81 MG EC tablet  Take 81 mg by mouth Daily.             buPROPion SR (WELLBUTRIN SR) 200 MG 12 hr tablet  Take 1 tablet by mouth Daily.             Cholecalciferol 1000 units capsule  Take 1,000 Units by mouth Daily.             citalopram (CeleXA) 20 MG tablet  Take 20 mg by mouth Daily.             CloNIDine (CATAPRES) 0.1 MG tablet  Take 0.1 mg by mouth 2 (Two) Times a Day.             collagenase 250 UNIT/GM ointment  Apply  topically Daily.             epoetin chloe (EPOGEN,PROCRIT) 94187 UNIT/ML injection  Inject 1 mL under the skin 1 (One) Time Per Week. Indications: Anemia associated with Chronic Kidney Failure             gabapentin (NEURONTIN) 300 MG capsule  Take 1 capsule by mouth Every 12 (Twelve) Hours.             hydrALAZINE (APRESOLINE) 25 MG tablet  Take 3 tablets by mouth Every 8 (Eight) Hours.             HYDROcodone-acetaminophen (NORCO) 5-325 MG per tablet  Take 1 tablet by mouth Every 4 (Four) Hours As Needed for Moderate Pain .             hydrophor (AQUAPHOR) ointment ointment  Apply  topically As Needed (Dry Skin).             insulin detemir (LEVEMIR) 100 UNIT/ML injection  Inject 25 Units under the skin Every Night.             metoprolol tartrate (LOPRESSOR) 50 MG tablet  Take 1 tablet by mouth Every 12 (Twelve) Hours.             pantoprazole (PROTONIX) 40 MG EC tablet  Take 1 tablet by mouth 2 (Two) Times a Day Before Meals.             risperiDONE (risperDAL) 2 MG tablet  Take 2 mg by mouth Daily.             sodium bicarbonate 650 MG tablet  Take 1 tablet by mouth 3 (Three) Times a Day.             tamsulosin (FLOMAX) 0.4 MG capsule 24 hr capsule  Take 1 capsule by mouth Every Night.             traZODone (DESYREL) 100 MG tablet  Take 1 tablet by mouth Every Night.                 Discharge Diet: diabetic renal cardiac    Activity at Discharge: per rehab facility    Follow-up Appointments  No future  appointments.   Ointment with cardiology in 1-2 weeks  Appointment with GI in 2-4 weeks  To be seen by primary care in 1-2 weeks  Nephrology will see during dialysis rounds  Coumadin to be dosed by olga Fletcher MD  02/13/18  10:12 AM    Time: Discharge 45 min

## 2018-02-13 NOTE — DISCHARGE INSTR - LAB
Patient received first dose of Coumadin 5mg today 2/13.     PT and INR to be done 02/14/18 ***Call MD to redose Warfarin***    PT and INR to be checked daily x 5 days. Coumadin to be adjusted as needed by rehab MD on call.     Follow up with Cardiologist upon discharge to decide if Coumadin needs to be continued.

## 2018-02-13 NOTE — PROGRESS NOTES
Discharge Planning Assessment  [facility]     Patient Name: Kevin Mckeon V  MRN: [id number]  Today's Date: 2/13/2018    Admit Date: 2/5/2018    ischarge Plan       02/13/18 1352    Case Management/Social Work Plan    Plan [facility]     Additional Comments Op notes and dialysis flowsheets faxed to [facility] [phone] per her request. Pt set up for dialysis MWF at 12:30pm. Pts first dialysis will be tomorrow, Wednesday 2/14/18 and he needs to arrive at 11:45am. Pt  being discharged to [facility]. [facility] set up for 4pm. Leni BRAY updated and given pkt. Gemma/Signature updated.          Discharge Placement     Facility/Agency Request Status Selected? Address Phone Number Fax Number    [facility] [place] Accepted    [address] [facility] [address] [phone]    [facility] Pending - Request Sent     [address] [facility] [address] [phone]        Expected Discharge Date and Time     Expected Discharge Date Expected Discharge Time    Feb 13, 2018               Danielle Verdin RN

## 2018-02-13 NOTE — PLAN OF CARE
Problem: Patient Care Overview (Adult)  Goal: Plan of Care Review  Outcome: Ongoing (interventions implemented as appropriate)   02/13/18 0817   Coping/Psychosocial Response Interventions   Plan Of Care Reviewed With patient   Patient Care Overview   Progress improving   Outcome Evaluation   Outcome Summary/Follow up Plan c/o pain x3 this shift; pt down at dialysis at beg of shift; wound cx collected from R leg (not foot); afib on monitor; new start time for 24hr urine collection started at 2-12-18 2200; no acute distress noted; will continue to monitor       Problem: Fall Risk (Adult)  Goal: Absence of Falls  Outcome: Ongoing (interventions implemented as appropriate)

## 2018-02-13 NOTE — PROGRESS NOTES
Subjective except chronic foot pain has no c/o denies cp/sob   Chief complaint:esrd         Objective:nad      Vital Signs  Temp:  [97.8 °F (36.6 °C)-98.5 °F (36.9 °C)] 98.5 °F (36.9 °C)  Heart Rate:  [] 67  Resp:  [16-18] 18  BP: (134-165)/(78-96) 152/83        Intake/Output Summary (Last 24 hours) at 02/13/18 0850  Last data filed at 02/13/18 0545   Gross per 24 hour   Intake              120 ml   Output             2500 ml   Net            -2380 ml           Physical Exam    Constitutional : well developed ,No acute distress  ENMT lips pink oral mucosa moist   Eyes sclerae white PERRL  Respiratory: Clear to auscultation , No crackles no wheezing respirations are even and un-labored  GI: Soft, Non tender, BS active in all 4 quadrants  CVS: S1 S2 regular, no rub murmur or gallops  Skin warm dry no rashes no petechiae multiple wounds heel right below knee right   Neuro grossly nonfocal cranial nerves 2-12 grossly intact sensation intact  Ext: b/l LE edema, Peripheral pulses intact   Heme no cervical  lymphadenopathy no petechiae  RIJ TDC no signs of inflammation         Results Review:      Lab Results   Component Value Date    GLUCOSE 80 02/13/2018    CALCIUM 7.6 (L) 02/13/2018     02/13/2018    K 3.7 02/13/2018    CO2 28.8 02/13/2018     02/13/2018    BUN 25 (H) 02/13/2018    CREATININE 2.25 (H) 02/13/2018    EGFRIFNONA 29 (L) 02/13/2018    BCR 11.1 02/13/2018    ANIONGAP 9.2 02/13/2018       Lab Results   Component Value Date    WBC 5.79 02/13/2018    HGB 8.6 (L) 02/13/2018    HCT 28.9 (L) 02/13/2018    MCV 94.4 02/13/2018     02/13/2018       Pertinent Imaging studies were reviewed      Medication Review:       Current Facility-Administered Medications:   •  acetaminophen (TYLENOL) tablet 650 mg, 650 mg, Oral, Q6H PRN, Hank Joaquin MD  •  amLODIPine (NORVASC) tablet 10 mg, 10 mg, Oral, Q24H, Hank Joaquin MD, 10 mg at 02/13/18 0840  •  arformoterol (BROVANA) nebulizer  solution 15 mcg, 15 mcg, Nebulization, BID - RT, Tony Mccauley MD, 15 mcg at 02/12/18 2256  •  aspirin chewable tablet 81 mg, 81 mg, Oral, Daily, Hank Joaquin MD, 81 mg at 02/13/18 0840  •  budesonide (PULMICORT) nebulizer solution 0.25 mg, 0.25 mg, Nebulization, BID - RT, Tony Mccaulye MD, 0.25 mg at 02/12/18 2256  •  bumetanide (BUMEX) tablet 3 mg, 3 mg, Oral, BID, Best Fletcher MD, 3 mg at 02/13/18 0840  •  buPROPion SR (WELLBUTRIN SR) 12 hr tablet 200 mg, 200 mg, Oral, Q12H, Hank Joaquin MD, 200 mg at 02/13/18 0841  •  ceFAZolin in dextrose (ANCEF) IVPB solution 2 g, 2 g, Intravenous, Once, Nitin Coronel MD  •  cholecalciferol (VITAMIN D3) tablet 1,000 Units, 1,000 Units, Oral, Daily, Hank Joaquin MD, 1,000 Units at 02/13/18 0840  •  citalopram (CeleXA) tablet 20 mg, 20 mg, Oral, Daily, Hank Joaquin MD, 20 mg at 02/13/18 0840  •  CloNIDine (CATAPRES) tablet 0.2 mg, 0.2 mg, Oral, Q12H, Hank Joaquin MD, 0.2 mg at 02/13/18 0840  •  dextrose (D50W) solution 25 g, 25 g, Intravenous, Q15 Min PRN, Danilo Del Valle MD  •  dextrose (GLUTOSE) oral gel 15 g, 15 g, Oral, Q15 Min PRN, Danilo Del Valle MD  •  epoetin chloe (EPOGEN,PROCRIT) injection 20,000 Units, 20,000 Units, Subcutaneous, Weekly, Hank Joaquin MD, 20,000 Units at 02/06/18 1844  •  gabapentin (NEURONTIN) capsule 300 mg, 300 mg, Oral, Q12H, Hank Joaquin MD, 300 mg at 02/13/18 0841  •  glucagon (human recombinant) (GLUCAGEN DIAGNOSTIC) injection 1 mg, 1 mg, Subcutaneous, PRN, Danilo Del Valle MD  •  heparin (porcine) 5000 UNIT/ML injection 5,000 Units, 5,000 Units, Subcutaneous, Q12H, Best Fletcher MD, 5,000 Units at 02/13/18 0843  •  heparin (porcine) injection 3,000 Units, 3,000 Units, Intracatheter, Once, Tutu Smith MD  •  hydrALAZINE (APRESOLINE) tablet 100 mg, 100 mg, Oral, Q8H, Best Fletcher MD, 100 mg at 02/12/18 4837  •  hydrocerin (EUCERIN) cream, , Topical,  Daily, He Strange MD  •  HYDROcodone-acetaminophen (NORCO) 5-325 MG per tablet 1 tablet, 1 tablet, Oral, Q4H PRN, Hank Joaquin MD, 1 tablet at 02/13/18 0729  •  insulin aspart (novoLOG) injection 0-9 Units, 0-9 Units, Subcutaneous, 4x Daily With Meals & Nightly, Danilo Del Valle MD, 2 Units at 02/12/18 0800  •  insulin detemir (LEVEMIR) injection 10 Units, 10 Units, Subcutaneous, QAM, Shruti Porras MD, 10 Units at 02/13/18 0840  •  insulin detemir (LEVEMIR) injection 22 Units, 22 Units, Subcutaneous, Nightly, Shruti Porras MD, 22 Units at 02/12/18 2202  •  metoprolol tartrate (LOPRESSOR) tablet 50 mg, 50 mg, Oral, Q12H, Hank Joaquin MD, 50 mg at 02/13/18 0840  •  pantoprazole (PROTONIX) EC tablet 40 mg, 40 mg, Oral, BID AC, Danilo Hensley MD, 40 mg at 02/13/18 0729  •  risperiDONE (risperDAL) tablet 2 mg, 2 mg, Oral, Nightly, Hank Joaquin MD, 2 mg at 02/12/18 2147  •  sodium bicarbonate tablet 650 mg, 650 mg, Oral, TID, Hank Joaquin MD, 650 mg at 02/13/18 0840  •  tamsulosin (FLOMAX) 24 hr capsule 0.4 mg, 0.4 mg, Oral, Nightly, Hakn Joaquin MD, 0.4 mg at 02/12/18 2150  •  traMADol (ULTRAM) tablet 50 mg, 50 mg, Oral, Q6H PRN, Gage Saavedra Jr., MD, 50 mg at 02/12/18 1316  •  traZODone (DESYREL) tablet 100 mg, 100 mg, Oral, Nightly, Hank Joaquin MD, 100 mg at 02/12/18 2150       Assesment  End-stage renal disease, initiated on hemodialysis  Anemia of chronic disease with iron deficiency  Lower Extremity edema, better with dialysis/ultrafiltration  Diabetes mellitus  Multiple skin ulcers, no antibiotics or vascular intervention needed per consultants  Decubitus ulcers  Deconditioning, physical therapy following  COPD  Hypertension  Tobacco abuse  Peripheral vascular disease  Erosive esophagitis  Afib             Plan:    Appreciate all consultants input  Continue diuretic   Rated control for Afib echo to be done later today AC per  cards awaiting call back to see if need to start warfarin and heparin drip  Continue TIW HD   chair in [facility] HD unit starting 2/14/2018  d/c home when ok with all consultants  F/u 24 hour restarted again last night  bumex 3 mg po bid but still edema will need for now to continue HD for fluid control reevaluate later    D/w patient        Best Fletcher MD  02/13/18  8:50 AM  Tel: [phone]  Fax: [phone]

## 2018-02-13 NOTE — NURSING NOTE
Called [facility]. MD discussed plan of care with DON at facility. Discharge instructions in AVS.    Patient received first dose of Coumadin 5mg today 2/13.     PT and INR to be done 02/14/18. Call MD to redose Joseph    PT and INR to be checked daily x 5 days. Coumadin to be adjusted as needed by rehab MD on call.     Follow up with Cardiologist upon discharge to decide if Coumadin needs to be continued.

## 2018-02-13 NOTE — PROGRESS NOTES
"    Patient Name: Kevin Mckeon V  :1950  67 y.o.      Patient Care Team:  Alicia Harley MD as PCP - General (Family Medicine)    Chief Complaint: PAF    Interval History: Denies CP, SOB       Objective   Vital Signs  Temp:  [97.8 °F (36.6 °C)-98.5 °F (36.9 °C)] 97.8 °F (36.6 °C)  Heart Rate:  [] 102  Resp:  [16-18] 18  BP: (134-165)/(78-96) 147/85    Intake/Output Summary (Last 24 hours) at 18 0721  Last data filed at 18 0545   Gross per 24 hour   Intake              330 ml   Output             2500 ml   Net            -2170 ml     Flowsheet Rows         First Filed Value    Admission Height  182.9 cm (72\") Documented at 2018 1833    Admission Weight  83.7 kg (184 lb 8.4 oz) Documented at 2018 1409          Physical Exam:   General Appearance:    Alert, cooperative, in no acute distress   Lungs:     Clear to auscultation.  Normal respiratory effort and rate.      Heart:    irregular rhythm and normal rate, normal S1 and S2, no murmurs, gallops or rubs.     Chest Wall:    No abnormalities observed   Abdomen:     Soft, nontender, positive bowel sounds.     Extremities:   no cyanosis, clubbing or edema.  No marked joint deformities.  Adequate musculoskeletal strength.       Results Review:      Results from last 7 days  Lab Units 18  0557   SODIUM mmol/L 137   POTASSIUM mmol/L 4.4   CHLORIDE mmol/L 99   CO2 mmol/L 25.9   BUN mg/dL 42*   CREATININE mg/dL 3.13*   GLUCOSE mg/dL 166*   CALCIUM mg/dL 7.9*           Results from last 7 days  Lab Units 18  0557   WBC 10*3/mm3 5.81   HEMOGLOBIN g/dL 9.0*   HEMATOCRIT % 30.8*   PLATELETS 10*3/mm3 232                           Medication Review:     amLODIPine 10 mg Oral Q24H   arformoterol 15 mcg Nebulization BID - RT   aspirin 81 mg Oral Daily   budesonide 0.25 mg Nebulization BID - RT   bumetanide 3 mg Oral BID   buPROPion  mg Oral Q12H   ceFAZolin 2 g Intravenous Once   cholecalciferol 1,000 Units Oral Daily "   citalopram 20 mg Oral Daily   CloNIDine 0.2 mg Oral Q12H   epoetin chloe 20,000 Units Subcutaneous Weekly   gabapentin 300 mg Oral Q12H   heparin (porcine) 5,000 Units Subcutaneous Q12H   heparin (porcine) 3,000 Units Intracatheter Once   hydrALAZINE 100 mg Oral Q8H   hydrocerin  Topical Daily   insulin aspart 0-9 Units Subcutaneous 4x Daily With Meals & Nightly   insulin detemir 10 Units Subcutaneous QAM   insulin detemir 22 Units Subcutaneous Nightly   metoprolol tartrate 50 mg Oral Q12H   pantoprazole 40 mg Oral BID AC   risperiDONE 2 mg Oral Nightly   sodium bicarbonate 650 mg Oral TID   tamsulosin 0.4 mg Oral Nightly   traZODone 100 mg Oral Nightly             Assessment/Plan     Active Hospital Problems (** Indicates Principal Problem)    Diagnosis Date Noted   • **Acute renal failure (ARF) [N17.9] 02/06/2018   • PAF (paroxysmal atrial fibrillation) [I48.0] 02/12/2018   • DM (diabetes mellitus) type II controlled with renal manifestation [E11.29] 01/19/2018   • DKA (diabetic ketoacidoses) [E13.10] 01/17/2018      Resolved Hospital Problems    Diagnosis Date Noted Date Resolved   No resolved problems to display.       1.  Acute renal failure  2.  Paroxysmal atrial fibrillation-on metoprolol for rate control.  Patient does meet guideline recommendations for chronic anticoagulation once medical/renal status allows  3.  Diabetes mellitus with renal insufficiency  4.  An echocardiogram has been  ordered; this will be reviewed once available.    Leandro Fung III, MD  [facility]  02/13/18  7:21 AM

## 2018-02-13 NOTE — PROGRESS NOTES
Infectious Diseases Progress Note    Bettie Gutierrez MD     [facility]  [place]: 8 days  Patient Identification:  Name: Kevin Mckeon V  Age: 67 y.o.  Sex: male  :  1950  MRN: [id number]         Primary Care Physician: Alicia Harley MD            Subjective: Feeling better denies any specific symptoms no fever no chills.  There is no worsening in the lower extremity discomfort.  Interval History: See initial consultation note.    Objective:    Scheduled Meds:    amLODIPine 10 mg Oral Q24H   arformoterol 15 mcg Nebulization BID - RT   aspirin 81 mg Oral Daily   budesonide 0.25 mg Nebulization BID - RT   bumetanide 3 mg Oral BID   buPROPion  mg Oral Q12H   ceFAZolin 2 g Intravenous Once   cholecalciferol 1,000 Units Oral Daily   citalopram 20 mg Oral Daily   CloNIDine 0.2 mg Oral Q12H   epoetin chloe 20,000 Units Subcutaneous Weekly   gabapentin 300 mg Oral Q12H   heparin (porcine) 5,000 Units Subcutaneous Q12H   heparin (porcine) 3,000 Units Intracatheter Once   hydrALAZINE 100 mg Oral Q8H   hydrocerin  Topical Daily   insulin aspart 0-9 Units Subcutaneous 4x Daily With Meals & Nightly   insulin detemir 10 Units Subcutaneous QAM   insulin detemir 22 Units Subcutaneous Nightly   metoprolol tartrate 50 mg Oral Q12H   pantoprazole 40 mg Oral BID AC   risperiDONE 2 mg Oral Nightly   sodium bicarbonate 650 mg Oral TID   tamsulosin 0.4 mg Oral Nightly   traZODone 100 mg Oral Nightly     Continuous Infusions:     Vital signs in last 24 hours:  Temp:  [97.8 °F (36.6 °C)-99.1 °F (37.3 °C)] 97.8 °F (36.6 °C)  Heart Rate:  [] 102  Resp:  [16-18] 18  BP: (134-165)/(78-96) 147/85    Intake/Output:    Intake/Output Summary (Last 24 hours) at 18 0110  Last data filed at 18 1900   Gross per 24 hour   Intake              330 ml   Output              700 ml   Net             -370 ml       Exam:  /85 (BP Location: Left arm, Patient Position: Lying)  Pulse 102  Temp 97.8 °F (36.6  "°C) (Oral)   Resp 18  Ht 182.9 cm (72.01\")  Wt 83.7 kg (184 lb 8.4 oz)  SpO2 96%  BMI 25.02 kg/m2    General Appearance:    Alert, cooperative, no distress, AAOx3                          Head:    Normocephalic, without obvious abnormality, atraumatic                           Eyes:    PERRL, conjunctiva/corneas clear, EOM's intact, both eyes                         Throat:   Lips, tongue, gums normal; oral mucosa pink and moist                           Neck:   Supple, symmetrical, trachea midline, no JVD                         Lungs:    Clear to auscultation bilaterally, respirations unlabored                 Chest Wall:    No tenderness or deformity, right IJ tunnel catheter in place                          Heart:    Regular rate and rhythm, S1 and S2 normal, no murmur,no  Rub                                      or gallop                  Abdomen:     Soft, non-tender, bowel sounds active, no masses, no                                                        organomegaly                  Extremities:  Right proximal lateral wound has no surrounding cellulitis and has minimal drainage. he does not have any tenderness and he doesn't feel bad.  Left ankle is dressed.                          Data Review:    I reviewed the patient's new clinical results.    Results from last 7 days  Lab Units 02/12/18  0557 02/11/18  0634 02/10/18  0622 02/09/18  0509 02/08/18 0514 02/07/18  0558 02/06/18  2212  02/06/18  0440   WBC 10*3/mm3 5.81 8.37 4.97 5.01 5.73  --   --   --  7.48   HEMOGLOBIN g/dL 9.0* 9.3* 7.8* 8.0* 8.0* 8.2* 8.2*  < > 6.9*   PLATELETS 10*3/mm3 232 257 242 239 239  --   --   --  266   < > = values in this interval not displayed.    Results from last 7 days  Lab Units 02/12/18 0557 02/11/18 0634 02/10/18  0622 02/09/18  0509 02/08/18  0514 02/07/18  0558 02/06/18  0440   SODIUM mmol/L 137 139 138 139 138 139 135*   POTASSIUM mmol/L 4.4 4.4 4.1 3.8 3.9 3.9 4.9   CHLORIDE mmol/L 99 103 101 102 102 102 " 100   CO2 mmol/L 25.9 26.2 29.2* 29.2* 27.5 24.3 22.6   BUN mg/dL 42* 34* 24* 18 36* 29* 63*   CREATININE mg/dL 3.13* 3.11* 2.80* 2.01* 2.78* 2.40* 3.85*   CALCIUM mg/dL 7.9* 8.1* 8.1* 8.2* 8.0* 8.0* 8.3*   GLUCOSE mg/dL 166* 65 51* 88 111* 98 258*     Microbiology Results (last 10 days)     Procedure Component Value - Date/Time    Respiratory Panel, PCR - Swab, Nasopharynx [[id number]]  (Normal) Collected:  02/06/18 1337    Lab Status:  Final result Specimen:  Swab from Nasopharynx Updated:  02/06/18 1636     ADENOVIRUS, PCR Not Detected     Coronavirus 229E Not Detected     Coronavirus HKU1 Not Detected     Coronavirus NL63 Not Detected     Coronavirus OC43 Not Detected     Human Metapneumovirus Not Detected     Human Rhinovirus/Enterovirus Not Detected     Influenza B PCR Not Detected     Parainfluenza Virus 1 Not Detected     Parainfluenza Virus 2 Not Detected     Parainfluenza Virus 3 Not Detected     Parainfluenza Virus 4 Not Detected     Bordetella pertussis pcr Not Detected     Influenza A H1N1 2009 PCR Not Detected     Chlamydophila pneumoniae PCR Not Detected     Mycoplasma pneumo by PCR Not Detected     Influenza A PCR Not Detected     Influenza A H3 Not Detected     Influenza A H1 Not Detected     RSV, PCR Not Detected            Assessment:  1-bilateral lower extremity chronic edema with stasis changes and immobility related wound with no obvious active infectionAt this time but low threshold to recommend systemic antibiotic therapy if he shows leukocytosis fever or change in mental status.-Right proximal leg lateral wound has minimal drainage with no surrounding cellulitis or tenderness.  2-right heel decubitus changes with wound with no surrounding cellulitis this time but did risk of infection going forward for which she needs to be monitored.  3-anemia with progressive drop in hemoglobin likely due to GI loss.  4-hypercapnic hypoxemic respiratory failure on BiPAP  5-acute on chronic renal  failure  6-underlying diabetes, hypertension, neuropathy  7-history of MRSA colonization.  Medications:  Continue to observe off of antibiotic therapy with periodic reassessments.  Continue with local care of the right proximal leg lateral wound.    Bettie Gutierrez MD  2/13/2018  1:10 AM    Much of this encounter note is an electronic transcription/translation of spoken language to printed text. The electronic translation of spoken language may permit erroneous, or at times, nonsensical words or phrases to be inadvertently transcribed; Although I have reviewed the note for such errors, some may still exist

## 2018-02-13 NOTE — PROGRESS NOTES
[facility]    Physicians Statement of Medical Necessity for Ambulance Transportation    It is medically necessary for:    Patient Name: Kevin Mckeon V    Insurance Information:      To be transported by ambulance:    From (if nursing facility, specify level of care: skilled, retirement, etc): [facility]     To (specify level of care if nursing facility): [facility]     Date of Service: 2/13/2018     For dialysis patients state date dialysis began:     Diagnosis: Acute renal failure     Past Medical/Surgical History:  Past Medical History:   Diagnosis Date   • COPD (chronic obstructive pulmonary disease)    • Diabetes mellitus    • Hypertension       Past Surgical History:   Procedure Laterality Date   • AMPUTATION FOOT / TOE  two years ago    5th toe on right foot   • ENDOSCOPY N/A 1/19/2018    Procedure: ESOPHAGOGASTRODUODENOSCOPY;  Surgeon: Danilo Hensley MD;  Location: [facility] OR;  Service:    • HERNIA REPAIR     • INSERTION HEMODIALYSIS CATHETER N/A 2/8/2018    Procedure: PALINDROME INSERTION;  Surgeon: Gage Saavedra Jr., MD;  Location: [facility] OR;  Service:    • LEG DEBRIDEMENT Bilateral 1/21/2018    Procedure: LOWER EXTREMITY DEBRIDEMENT 8:00;  Surgeon: Kaylyn Tillman DO;  Location: [facility] OR;  Service:         Current Objective Medical Evidence(including physical exam finding to support reason for limitations):    Immobilization syndrome  Requires oxygen  More than 3 steps to navigate to enter residence    Other:     Physician Signature:           (RN,NP,PA,CAN, Discharge Planner) Date/Time: 2/13/2018 11:28 AM      Printed Name:    ___________Danielle Verdin RN _______________________    [facility]   Phone: [phone] Phone: [phone] Phone: [phone]   Fax: [phone] Fax: [phone] Fax: [phone]

## 2018-02-14 NOTE — PAYOR COMM NOTE
"Kevin Mckeon (67 y.o. Male)     PLEASE SEE ATTACHED DC SUMMARY    REF#[id number]    THANK YOU    MAITE STAFFORD LPN, CCP        Date of Birth Social Security Number Address Home Phone MRN    1950  [id number] [place] [address] [phone] [id number]    Church Marital Status          None Single       Admission Date Admission Type Admitting Provider Attending Provider Department, Room/Bed    2/5/18 Urgent Hank Joaquin MD  [address], 636/1    Discharge Date Discharge Disposition Discharge Destination        2/13/2018 Rehab Facility or Unit (DC - External)             Attending Provider: (none)    Allergies:  No Known Allergies    Isolation:  Contact   Infection:  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)   Code Status:  Prior    Ht:  182.9 cm (72.01\")   Wt:  104 kg (228 lb 9.6 oz)    Admission Cmt:  None   Principal Problem:  Acute renal failure (ARF) [N17.9]                 Active Insurance as of 2/5/2018     Primary Coverage     Payor Plan Insurance Group Employer/Plan Group    [facility]      Payor Plan Address Payor Plan Phone Number Effective From Effective To    [address] [address] [phone] 1/17/2018     [place] [address]       Subscriber Name Subscriber Birth Date Member ID       KEVIN MCKEON 1950 [id number]                 Emergency Contacts      (Rel.) Home Phone Work Phone Mobile Phone    Dick Mckeon (Son) [phone] -- --    LindaDeisy wilson (Sister) [phone] -- --               Discharge Summary      Best Fletcher MD at 2/13/2018 10:12 AM          Date of Discharge:  2/13/2018    Discharge Diagnosis: ESRD    Presenting Problem/History of Present Illness  Acute renal failure (ARF) [N17.9]       Hospital Course  Patient is a 67 y.o. male presented with abnormal labs and was initiated on dialysis.  He had tunneled dialysis catheter placed.  He tolerated dialysis well.  He was seen by vascular surgery for his " peripheral arterial disease also.  His diabetes was managed by medicine without medical issues.    He developed yesterday morning atrial fibrillation with rapid ventricular response and he was started on beta blocker with good rate control.    I discussed with Dr. Fung today and he stated that if okay with okay with GI to proceed with Coumadin with no need of heparin drip for bridging.  Noted dropping hemoglobin but gastroenterology is okay with discharge and starting Coumadin.  He is also followed by infectious disease for his multiple wounds during this hospital stay and no antibiotics were given as it was deemed that the wounds are not infected.  He was also seen by pulmonary for his COPD and continued on his home CPAP.  Another 24 hour collection for urine can be performed as outpatient in the dialysis unit number is attending nephrologist worked for partial recovery of renal function.  Department of vascular surgery can be set up as outpatient.  Lipid lowering med to be started by cardiology.  Dosing of coumadin per MD in rehab then per cardiology prescription written   D/w charge nurse at rehab facility and PT INR will be checked in am tomorrow and rehab MD will continue coumadin dosing tomorrow .Goal INR 2-3.  Goals     None           Follow-up with cardiology    Gastroenterology    Continue wound care in the rehabilitation unit            Procedures Performed  Procedure(s):  PALINDROME INSERTION       Consults:   Consults     Date and Time Order Name Status Description    2/12/2018 0841 Inpatient Consult to Cardiology Completed     2/6/2018 1528 Inpatient Consult to Vascular Surgery Completed     2/6/2018 0905 Inpatient Consult to Pulmonology Completed     2/6/2018 0750 Inpatient Consult to Gastroenterology Completed     2/5/2018 2002 Inpatient Consult to Infectious Diseases Completed     1/20/2018 0848 Inpatient Consult to General Surgery Completed     1/18/2018 0920 Inpatient Consult to Nephrology  Completed     1/17/2018 1900 Inpatient Consult to Gastroenterology Completed     1/17/2018 0441 Inpatient Consult to Infectious Diseases Completed           Pertinent Test Results:   Results for orders placed or performed during the hospital encounter of 02/05/18   Respiratory Panel, PCR - Swab, Nasopharynx   Result Value Ref Range    ADENOVIRUS, PCR Not Detected Not Detected    Coronavirus 229E Not Detected Not Detected    Coronavirus HKU1 Not Detected Not Detected    Coronavirus NL63 Not Detected Not Detected    Coronavirus OC43 Not Detected Not Detected    Human Metapneumovirus Not Detected Not Detected    Human Rhinovirus/Enterovirus Not Detected Not Detected    Influenza B PCR Not Detected Not Detected    Parainfluenza Virus 1 Not Detected Not Detected    Parainfluenza Virus 2 Not Detected Not Detected    Parainfluenza Virus 3 Not Detected Not Detected    Parainfluenza Virus 4 Not Detected Not Detected    Bordetella pertussis pcr Not Detected Not Detected    Influenza A H1N1 2009 PCR Not Detected Not Detected    Chlamydophila pneumoniae PCR Not Detected Not Detected    Mycoplasma pneumo by PCR Not Detected Not Detected    Influenza A PCR Not Detected Not Detected    Influenza A H3 Not Detected Not Detected    Influenza A H1 Not Detected Not Detected    RSV, PCR Not Detected Not Detected   Urinalysis - Urine, Clean Catch   Result Value Ref Range    Color, UA Yellow Yellow, Straw    Appearance, UA Clear Clear    pH, UA 5.5 5.0 - 8.0    Specific Gravity, UA 1.013 1.005 - 1.030    Glucose,  mg/dL (2+) (A) Negative    Ketones, UA Negative Negative    Bilirubin, UA Negative Negative    Blood, UA Negative Negative    Protein,  mg/dL (2+) (A) Negative    Leuk Esterase, UA Negative Negative    Nitrite, UA Negative Negative    Urobilinogen, UA 0.2 E.U./dL 0.2 - 1.0 E.U./dL   Hemoglobin A1c   Result Value Ref Range    Hemoglobin A1C 10.60 (H) 4.80 - 5.60 %   Basic Metabolic Panel   Result Value Ref Range     Glucose 258 (H) 65 - 99 mg/dL    BUN 63 (H) 8 - 23 mg/dL    Creatinine 3.85 (H) 0.76 - 1.27 mg/dL    Sodium 135 (L) 136 - 145 mmol/L    Potassium 4.9 3.5 - 5.2 mmol/L    Chloride 100 98 - 107 mmol/L    CO2 22.6 22.0 - 29.0 mmol/L    Calcium 8.3 (L) 8.6 - 10.5 mg/dL    eGFR Non African Amer 16 (L) >60 mL/min/1.73    BUN/Creatinine Ratio 16.4 7.0 - 25.0    Anion Gap 12.4 mmol/L   Phosphorus   Result Value Ref Range    Phosphorus 6.7 (H) 2.5 - 4.5 mg/dL   Iron Profile   Result Value Ref Range    Iron 14 (L) 59 - 158 mcg/dL    Iron Saturation 7 (L) 20 - 50 %    Transferrin 126 (L) 200 - 360 mg/dL    TIBC 188 mcg/dL   Magnesium   Result Value Ref Range    Magnesium 2.1 1.6 - 2.4 mg/dL   CBC Auto Differential   Result Value Ref Range    WBC 7.48 4.50 - 10.70 10*3/mm3    RBC 2.54 (L) 4.60 - 6.00 10*6/mm3    Hemoglobin 6.9 (C) 13.7 - 17.6 g/dL    Hematocrit 23.1 (L) 40.4 - 52.2 %    MCV 90.9 79.8 - 96.2 fL    MCH 27.2 27.0 - 32.7 pg    MCHC 29.9 (L) 32.6 - 36.4 g/dL    RDW 15.1 (H) 11.5 - 14.5 %    RDW-SD 49.5 37.0 - 54.0 fl    MPV 11.1 6.0 - 12.0 fL    Platelets 266 140 - 500 10*3/mm3    Neutrophil % 76.3 (H) 42.7 - 76.0 %    Lymphocyte % 14.2 (L) 19.6 - 45.3 %    Monocyte % 6.0 5.0 - 12.0 %    Eosinophil % 1.5 0.3 - 6.2 %    Basophil % 0.7 0.0 - 1.5 %    Immature Grans % 1.3 (H) 0.0 - 0.5 %    Neutrophils, Absolute 5.71 1.90 - 8.10 10*3/mm3    Lymphocytes, Absolute 1.06 0.90 - 4.80 10*3/mm3    Monocytes, Absolute 0.45 0.20 - 1.20 10*3/mm3    Eosinophils, Absolute 0.11 0.00 - 0.70 10*3/mm3    Basophils, Absolute 0.05 0.00 - 0.20 10*3/mm3    Immature Grans, Absolute 0.10 (H) 0.00 - 0.03 10*3/mm3    nRBC 0.0 0.0 - 0.0 /100 WBC   Hemoglobin & Hematocrit, Blood   Result Value Ref Range    Hemoglobin 6.8 (C) 13.7 - 17.6 g/dL    Hematocrit 22.9 (L) 40.4 - 52.2 %   Blood Gas, Arterial   Result Value Ref Range    Site Arterial: right radial     Loi's Test Positive     pH, Arterial 7.299 (C) 7.350 - 7.450 pH units    pCO2,  Arterial 52.7 (H) 35.0 - 45.0 mm Hg    pO2, Arterial 67.4 (L) 80.0 - 100.0 mm Hg    HCO3, Arterial 25.9 22.0 - 28.0 mmol/L    Base Excess, Arterial -0.6 (L) 0.0 - 2.0 mmol/L    O2 Saturation Calculated 90.6 (L) 92.0 - 99.0 %    Barometric Pressure for Blood Gas 755.3 mmHg    Modality Cannula     Flow Rate 3 lpm    Rate 16 Breaths/minute   Blood Gas, Arterial   Result Value Ref Range    Site Arterial: right radial     Loi's Test Positive     pH, Arterial 7.314 (L) 7.350 - 7.450 pH units    pCO2, Arterial 52.2 (H) 35.0 - 45.0 mm Hg    pO2, Arterial 61.5 (L) 80.0 - 100.0 mm Hg    HCO3, Arterial 26.5 22.0 - 28.0 mmol/L    Base Excess, Arterial 0.3 0.0 - 2.0 mmol/L    O2 Saturation Calculated 88.6 (L) 92.0 - 99.0 %    A-a Gradiant 0.3 mmHg    Barometric Pressure for Blood Gas 756.3 mmHg    Modality BiPap     FIO2 35 %    [facility] 20     Rate 23 Breaths/minute   Hemoglobin & Hematocrit, Blood   Result Value Ref Range    Hemoglobin 8.2 (L) 13.7 - 17.6 g/dL    Hematocrit 26.5 (L) 40.4 - 52.2 %   Hepatitis B Surface Antigen   Result Value Ref Range    Hepatitis B Surface Ag Non-Reactive Non-Reactive   Blood Gas, Arterial   Result Value Ref Range    Site Arterial: right radial     Loi's Test Positive     pH, Arterial 7.385 7.350 - 7.450 pH units    pCO2, Arterial 42.1 35.0 - 45.0 mm Hg    pO2, Arterial 71.8 (L) 80.0 - 100.0 mm Hg    HCO3, Arterial 25.2 22.0 - 28.0 mmol/L    Base Excess, Arterial 0.1 0.0 - 2.0 mmol/L    O2 Saturation Calculated 93.9 92.0 - 99.0 %    A-a Gradiant 0.3 mmHg    Barometric Pressure for Blood Gas 758.6 mmHg    Modality BiPap     FIO2 35 %    Set Mech Resp Rate 20     Rate 22 Breaths/minute   Hemoglobin & Hematocrit, Blood   Result Value Ref Range    Hemoglobin 8.2 (L) 13.7 - 17.6 g/dL    Hematocrit 26.9 (L) 40.4 - 52.2 %   Renal Function Panel   Result Value Ref Range    Glucose 98 65 - 99 mg/dL    BUN 29 (H) 8 - 23 mg/dL    Creatinine 2.40 (H) 0.76 - 1.27 mg/dL    Sodium 139 136 - 145  mmol/L    Potassium 3.9 3.5 - 5.2 mmol/L    Chloride 102 98 - 107 mmol/L    CO2 24.3 22.0 - 29.0 mmol/L    Calcium 8.0 (L) 8.6 - 10.5 mg/dL    Albumin 2.20 (L) 3.50 - 5.20 g/dL    Phosphorus 3.8 2.5 - 4.5 mg/dL    Anion Gap 12.7 mmol/L    BUN/Creatinine Ratio 12.1 7.0 - 25.0    eGFR Non African Amer 27 (L) >60 mL/min/1.73   Renal Function Panel   Result Value Ref Range    Glucose 111 (H) 65 - 99 mg/dL    BUN 36 (H) 8 - 23 mg/dL    Creatinine 2.78 (H) 0.76 - 1.27 mg/dL    Sodium 138 136 - 145 mmol/L    Potassium 3.9 3.5 - 5.2 mmol/L    Chloride 102 98 - 107 mmol/L    CO2 27.5 22.0 - 29.0 mmol/L    Calcium 8.0 (L) 8.6 - 10.5 mg/dL    Albumin 2.40 (L) 3.50 - 5.20 g/dL    Phosphorus 4.8 (H) 2.5 - 4.5 mg/dL    Anion Gap 8.5 mmol/L    BUN/Creatinine Ratio 12.9 7.0 - 25.0    eGFR Non African Amer 23 (L) >60 mL/min/1.73   CBC (No Diff)   Result Value Ref Range    WBC 5.73 4.50 - 10.70 10*3/mm3    RBC 2.93 (L) 4.60 - 6.00 10*6/mm3    Hemoglobin 8.0 (L) 13.7 - 17.6 g/dL    Hematocrit 26.9 (L) 40.4 - 52.2 %    MCV 91.8 79.8 - 96.2 fL    MCH 27.3 27.0 - 32.7 pg    MCHC 29.7 (L) 32.6 - 36.4 g/dL    RDW 15.0 (H) 11.5 - 14.5 %    RDW-SD 49.2 37.0 - 54.0 fl    MPV 10.7 6.0 - 12.0 fL    Platelets 239 140 - 500 10*3/mm3   Renal Function Panel   Result Value Ref Range    Glucose 88 65 - 99 mg/dL    BUN 18 8 - 23 mg/dL    Creatinine 2.01 (H) 0.76 - 1.27 mg/dL    Sodium 139 136 - 145 mmol/L    Potassium 3.8 3.5 - 5.2 mmol/L    Chloride 102 98 - 107 mmol/L    CO2 29.2 (H) 22.0 - 29.0 mmol/L    Calcium 8.2 (L) 8.6 - 10.5 mg/dL    Albumin 2.20 (L) 3.50 - 5.20 g/dL    Phosphorus 3.3 2.5 - 4.5 mg/dL    Anion Gap 7.8 mmol/L    BUN/Creatinine Ratio 9.0 7.0 - 25.0    eGFR Non African Amer 34 (L) >60 mL/min/1.73   CBC (No Diff)   Result Value Ref Range    WBC 5.01 4.50 - 10.70 10*3/mm3    RBC 2.95 (L) 4.60 - 6.00 10*6/mm3    Hemoglobin 8.0 (L) 13.7 - 17.6 g/dL    Hematocrit 27.3 (L) 40.4 - 52.2 %    MCV 92.5 79.8 - 96.2 fL    MCH 27.1 27.0 -  32.7 pg    MCHC 29.3 (L) 32.6 - 36.4 g/dL    RDW 15.1 (H) 11.5 - 14.5 %    RDW-SD 50.2 37.0 - 54.0 fl    MPV 10.4 6.0 - 12.0 fL    Platelets 239 140 - 500 10*3/mm3   Hepatitis B E Antigen   Result Value Ref Range    Hep B E Ag Negative Negative   Hepatitis B Surface Antibody   Result Value Ref Range    Hep B S Ab Reactive (A) Non-Reactive   Hepatitis B Core Antibody, Total   Result Value Ref Range    Hep B Core Total Ab Negative Negative   Renal Function Panel   Result Value Ref Range    Glucose 51 (L) 65 - 99 mg/dL    BUN 24 (H) 8 - 23 mg/dL    Creatinine 2.80 (H) 0.76 - 1.27 mg/dL    Sodium 138 136 - 145 mmol/L    Potassium 4.1 3.5 - 5.2 mmol/L    Chloride 101 98 - 107 mmol/L    CO2 29.2 (H) 22.0 - 29.0 mmol/L    Calcium 8.1 (L) 8.6 - 10.5 mg/dL    Albumin 2.20 (L) 3.50 - 5.20 g/dL    Phosphorus 3.9 2.5 - 4.5 mg/dL    Anion Gap 7.8 mmol/L    BUN/Creatinine Ratio 8.6 7.0 - 25.0    eGFR Non African Amer 23 (L) >60 mL/min/1.73   CBC Auto Differential   Result Value Ref Range    WBC 4.97 4.50 - 10.70 10*3/mm3    RBC 2.83 (L) 4.60 - 6.00 10*6/mm3    Hemoglobin 7.8 (L) 13.7 - 17.6 g/dL    Hematocrit 26.5 (L) 40.4 - 52.2 %    MCV 93.6 79.8 - 96.2 fL    MCH 27.6 27.0 - 32.7 pg    MCHC 29.4 (L) 32.6 - 36.4 g/dL    RDW 15.4 (H) 11.5 - 14.5 %    RDW-SD 51.3 37.0 - 54.0 fl    MPV 10.4 6.0 - 12.0 fL    Platelets 242 140 - 500 10*3/mm3    Neutrophil % 57.7 42.7 - 76.0 %    Lymphocyte % 28.0 19.6 - 45.3 %    Monocyte % 9.5 5.0 - 12.0 %    Eosinophil % 2.8 0.3 - 6.2 %    Basophil % 0.8 0.0 - 1.5 %    Immature Grans % 1.2 (H) 0.0 - 0.5 %    Neutrophils, Absolute 2.87 1.90 - 8.10 10*3/mm3    Lymphocytes, Absolute 1.39 0.90 - 4.80 10*3/mm3    Monocytes, Absolute 0.47 0.20 - 1.20 10*3/mm3    Eosinophils, Absolute 0.14 0.00 - 0.70 10*3/mm3    Basophils, Absolute 0.04 0.00 - 0.20 10*3/mm3    Immature Grans, Absolute 0.06 (H) 0.00 - 0.03 10*3/mm3   Renal Function Panel   Result Value Ref Range    Glucose 65 65 - 99 mg/dL    BUN 34 (H) 8  - 23 mg/dL    Creatinine 3.11 (H) 0.76 - 1.27 mg/dL    Sodium 139 136 - 145 mmol/L    Potassium 4.4 3.5 - 5.2 mmol/L    Chloride 103 98 - 107 mmol/L    CO2 26.2 22.0 - 29.0 mmol/L    Calcium 8.1 (L) 8.6 - 10.5 mg/dL    Albumin 2.60 (L) 3.50 - 5.20 g/dL    Phosphorus 3.3 2.5 - 4.5 mg/dL    Anion Gap 9.8 mmol/L    BUN/Creatinine Ratio 10.9 7.0 - 25.0    eGFR Non African Amer 20 (L) >60 mL/min/1.73   CBC (No Diff)   Result Value Ref Range    WBC 8.37 4.50 - 10.70 10*3/mm3    RBC 3.38 (L) 4.60 - 6.00 10*6/mm3    Hemoglobin 9.3 (L) 13.7 - 17.6 g/dL    Hematocrit 31.9 (L) 40.4 - 52.2 %    MCV 94.4 79.8 - 96.2 fL    MCH 27.5 27.0 - 32.7 pg    MCHC 29.2 (L) 32.6 - 36.4 g/dL    RDW 15.9 (H) 11.5 - 14.5 %    RDW-SD 53.0 37.0 - 54.0 fl    MPV 10.5 6.0 - 12.0 fL    Platelets 257 140 - 500 10*3/mm3   Renal Function Panel   Result Value Ref Range    Glucose 166 (H) 65 - 99 mg/dL    BUN 42 (H) 8 - 23 mg/dL    Creatinine 3.13 (H) 0.76 - 1.27 mg/dL    Sodium 137 136 - 145 mmol/L    Potassium 4.4 3.5 - 5.2 mmol/L    Chloride 99 98 - 107 mmol/L    CO2 25.9 22.0 - 29.0 mmol/L    Calcium 7.9 (L) 8.6 - 10.5 mg/dL    Albumin 2.40 (L) 3.50 - 5.20 g/dL    Phosphorus 3.1 2.5 - 4.5 mg/dL    Anion Gap 12.1 mmol/L    BUN/Creatinine Ratio 13.4 7.0 - 25.0    eGFR Non African Amer 20 (L) >60 mL/min/1.73   CBC (No Diff)   Result Value Ref Range    WBC 5.81 4.50 - 10.70 10*3/mm3    RBC 3.23 (L) 4.60 - 6.00 10*6/mm3    Hemoglobin 9.0 (L) 13.7 - 17.6 g/dL    Hematocrit 30.8 (L) 40.4 - 52.2 %    MCV 95.4 79.8 - 96.2 fL    MCH 27.9 27.0 - 32.7 pg    MCHC 29.2 (L) 32.6 - 36.4 g/dL    RDW 15.9 (H) 11.5 - 14.5 %    RDW-SD 54.1 (H) 37.0 - 54.0 fl    MPV 10.8 6.0 - 12.0 fL    Platelets 232 140 - 500 10*3/mm3   TSH   Result Value Ref Range    TSH 4.130 0.270 - 4.200 mIU/mL   Renal Function Panel   Result Value Ref Range    Glucose 80 65 - 99 mg/dL    BUN 25 (H) 8 - 23 mg/dL    Creatinine 2.25 (H) 0.76 - 1.27 mg/dL    Sodium 138 136 - 145 mmol/L    Potassium  3.7 3.5 - 5.2 mmol/L    Chloride 100 98 - 107 mmol/L    CO2 28.8 22.0 - 29.0 mmol/L    Calcium 7.6 (L) 8.6 - 10.5 mg/dL    Albumin 2.00 (L) 3.50 - 5.20 g/dL    Phosphorus 2.2 (L) 2.5 - 4.5 mg/dL    Anion Gap 9.2 mmol/L    BUN/Creatinine Ratio 11.1 7.0 - 25.0    eGFR Non African Amer 29 (L) >60 mL/min/1.73   CBC Auto Differential   Result Value Ref Range    WBC 5.79 4.50 - 10.70 10*3/mm3    RBC 3.06 (L) 4.60 - 6.00 10*6/mm3    Hemoglobin 8.6 (L) 13.7 - 17.6 g/dL    Hematocrit 28.9 (L) 40.4 - 52.2 %    MCV 94.4 79.8 - 96.2 fL    MCH 28.1 27.0 - 32.7 pg    MCHC 29.8 (L) 32.6 - 36.4 g/dL    RDW 15.9 (H) 11.5 - 14.5 %    RDW-SD 54.5 (H) 37.0 - 54.0 fl    MPV 10.9 6.0 - 12.0 fL    Platelets 242 140 - 500 10*3/mm3    Neutrophil % 63.2 42.7 - 76.0 %    Lymphocyte % 23.0 19.6 - 45.3 %    Monocyte % 10.2 5.0 - 12.0 %    Eosinophil % 1.9 0.3 - 6.2 %    Basophil % 0.3 0.0 - 1.5 %    Immature Grans % 1.4 (H) 0.0 - 0.5 %    Neutrophils, Absolute 3.66 1.90 - 8.10 10*3/mm3    Lymphocytes, Absolute 1.33 0.90 - 4.80 10*3/mm3    Monocytes, Absolute 0.59 0.20 - 1.20 10*3/mm3    Eosinophils, Absolute 0.11 0.00 - 0.70 10*3/mm3    Basophils, Absolute 0.02 0.00 - 0.20 10*3/mm3    Immature Grans, Absolute 0.08 (H) 0.00 - 0.03 10*3/mm3   POC Glucose Once   Result Value Ref Range    Glucose 238 (H) 70 - 130 mg/dL   POC Glucose Once   Result Value Ref Range    Glucose 254 (H) 70 - 130 mg/dL   POC Glucose Once   Result Value Ref Range    Glucose 203 (H) 70 - 130 mg/dL   POC Glucose Once   Result Value Ref Range    Glucose 155 (H) 70 - 130 mg/dL   POC Glucose Once   Result Value Ref Range    Glucose 84 70 - 130 mg/dL   POC Glucose Once   Result Value Ref Range    Glucose 89 70 - 130 mg/dL   POC Glucose Once   Result Value Ref Range    Glucose 102 70 - 130 mg/dL   POC Glucose Once   Result Value Ref Range    Glucose 111 70 - 130 mg/dL   POC Glucose Once   Result Value Ref Range    Glucose 323 (H) 70 - 130 mg/dL   POC Glucose Once   Result Value  Ref Range    Glucose 345 (H) 70 - 130 mg/dL   POC Glucose Once   Result Value Ref Range    Glucose 476 (C) 70 - 130 mg/dL   POC Glucose Once   Result Value Ref Range    Glucose 435 (H) 70 - 130 mg/dL   POC Glucose Once   Result Value Ref Range    Glucose 102 70 - 130 mg/dL   POC Glucose Once   Result Value Ref Range    Glucose 80 70 - 130 mg/dL   POC Glucose Once   Result Value Ref Range    Glucose 123 70 - 130 mg/dL   POC Glucose Once   Result Value Ref Range    Glucose 135 (H) 70 - 130 mg/dL   POC Glucose Once   Result Value Ref Range    Glucose 75 70 - 130 mg/dL   POC Glucose Once   Result Value Ref Range    Glucose 242 (H) 70 - 130 mg/dL   POC Glucose Once   Result Value Ref Range    Glucose 194 (H) 70 - 130 mg/dL   POC Glucose Once   Result Value Ref Range    Glucose 167 (H) 70 - 130 mg/dL   POC Glucose Once   Result Value Ref Range    Glucose 51 (L) 70 - 130 mg/dL   POC Glucose Once   Result Value Ref Range    Glucose 67 (L) 70 - 130 mg/dL   POC Glucose Once   Result Value Ref Range    Glucose 135 (H) 70 - 130 mg/dL   POC Glucose Once   Result Value Ref Range    Glucose 262 (H) 70 - 130 mg/dL   POC Glucose Once   Result Value Ref Range    Glucose 201 (H) 70 - 130 mg/dL   POC Glucose Once   Result Value Ref Range    Glucose 133 (H) 70 - 130 mg/dL   POC Glucose Once   Result Value Ref Range    Glucose 72 70 - 130 mg/dL   POC Glucose Once   Result Value Ref Range    Glucose 107 70 - 130 mg/dL   POC Glucose Once   Result Value Ref Range    Glucose 179 (H) 70 - 130 mg/dL   POC Glucose Once   Result Value Ref Range    Glucose 215 (H) 70 - 130 mg/dL   POC Glucose Once   Result Value Ref Range    Glucose 170 (H) 70 - 130 mg/dL   POC Glucose Once   Result Value Ref Range    Glucose 148 (H) 70 - 130 mg/dL   POC Glucose Once   Result Value Ref Range    Glucose 126 70 - 130 mg/dL   POC Glucose Once   Result Value Ref Range    Glucose 88 70 - 130 mg/dL   Type & Screen   Result Value Ref Range    ABO Type A     RH type  Positive     Antibody Screen Negative    Prepare RBC, 1 Units   Result Value Ref Range    Product Code [id number]     Unit Number [id number]     UNIT  ABO A     UNIT  RH POS     Dispense Status PT     Blood Type APOS     Blood Expiration Date [id number]     Blood Type Barcode 6200    Duplex Venous Lower Extremity - Bilateral CAR   Result Value Ref Range    Right Common Femoral Spont Y     Right Common Femoral Phasic Y     Right Common Femoral Augment Y     Right Common Femoral Competent Y     Right Common Femoral Compress C     Right Saphenofemoral Junction Spont Y     Right Saphenofemoral Junction Phasic Y     Right Saphenofemoral Junction Augment Y     Right Saphenofemoral Junction Compress C     Right Proximal Femoral Compress C     Right Mid Femoral Spont Y     Right Mid Femoral Phasic Y     Right Mid Femoral Augment Y     Right Mid Femoral Competent Y     Right Mid Femoral Compress C     Right Distal Femoral Compress C     Right Popliteal Spont Y     Right Popliteal Phasic Y     Right Popliteal Augment Y     Right Popliteal Competent Y     Right Popliteal Compress C     Right Posterior Tibial Compress C     Right Peroneal Compress C     Right GastronemiusSoleal Compress C     Right Greater Saph AK Compress C     Right Greater Saph BK Compress C     Left Common Femoral Spont Y     Left Common Femoral Phasic Y     Left Common Femoral Augment Y     Left Common Femoral Competent Y     Left Common Femoral Compress C     Left Saphenofemoral Junction Spont Y     Left Saphenofemoral Junction Phasic Y     Left Saphenofemoral Junction Augment Y     Left Saphenofemoral Junction Compress C     Left Proximal Femoral Compress C     Left Mid Femoral Spont Y     Left Mid Femoral Phasic Y     Left Mid Femoral Augment Y     Left Mid Femoral Competent Y     Left Mid Femoral Compress C     Left Distal Femoral Compress C     Left Popliteal Spont Y     Left Popliteal Phasic Y     Left Popliteal Augment Y     Left Popliteal  Competent Y     Left Popliteal Compress C     Left Posterior Tibial Compress C     Left Peroneal Compress C     Left GastronemiusSoleal Compress C     Left Greater Saph AK Compress C     Left Greater Saph BK Compress C    Duplex Initial Vein Mapping Hemodialysis Access CAR   Result Value Ref Range    Cephalic upper arm right - prox 0.21 cm    Cephalic upper arm right - mid 0.20 cm    Cephalic upper arm right - dist 0.20 cm    Cephalic Forearm right - prox 0.29 cm    Cephalic Forearm right - mid 0.24 cm    Cephalic Forearm right - dist 0.23 cm    Basilic upper arm right - prox 0.50 cm    Basilic upper arm right - mid 0.37 cm    Basilic upper arm right - dist 0.38 cm    Basilic Forearm right - prox 0.24 cm    Basilic Forearm right - mid 0.20 cm    Basilic Forearm right - dist 0.32 cm    Radial upper arm right - prox 0.29 cm    Radial upper arm right - mid 0.26 cm    Radial upper arm right - dist 0.25 cm    Cephalic upper arm left - prox 0.17 cm    Cephalic upper arm left - mid 0.12 cm    Cephalic upper arm left - dist 0.13 cm    Cephalic Forearm left - prox 0.18 cm    Cephalic Forearm left - mid 0.22 cm    Basilic upper arm left - prox 0.37 cm    Basilic upper arm left - mid 0.34 cm    Basilic upper arm left - dist 0.28 cm    Basilic Forearm left - prox 0.21 cm    Basilic Forearm left - mid 0.18 cm    Basilic Forearm left - dist 0.16 cm    Radial upper arm left - prox 0.27 cm    Radial upper arm left - mid 0.19 cm    Radial upper arm left - dist 0.25 cm    Upper arterial right arm brachial length 0.61 cm    Upper arterial left arm brachial length 0.53 cm    Basilic Antecubital Fossa Left 0.20 cm    Basilic Antecubital Fossa Right 0.27 cm    Cephalic Antecubital Fossa Left 0.22 cm    Cephalic Antecubital Fossa Right 0.28 cm   Doppler Arterial Multi Level Lower Extremity - Bilateral CAR   Result Value Ref Range    RIGHT LOW THIGH SYS  mmHg    RIGHT POPLITEAL SYS  mmHg    RIGHT DORSALIS PEDIS SYS   mmHg    RIGHT POST TIBIAL SYS  mmHg    RIGHT GREAT TOE SYS  mmHg    RIGHT FLAQUITA RATIO 1.3     RIGHT TBI RATIO 1.12     LEFT HIGH THIGH SYS  mmHg    LEFT LOW THIGH SYS  mmHg    LEFT POPLITEAL SYS  mmHg    LEFT DORSALIS PEDIS SYS  mmHg    LEFT POST TIBIAL SYS  mmHg    LEFT GREAT TOE SYS  mmHg    LEFT FLAQUITA RATIO 1.3     LEFT TBI RATIO 1.31     Upper arterial right arm brachial sys max 169 mmHg    Upper arterial left arm brachial sys max 167 mmHg       Condition on Discharge:  Stable    Vital Signs  Temp:  [97.8 °F (36.6 °C)-98.5 °F (36.9 °C)] 98.5 °F (36.9 °C)  Heart Rate:  [] 67  Resp:  [16-18] 18  BP: (134-165)/(78-96) 152/83    Physical Exam:  Constitutional : well developed ,No acute distress  ENMT lips pink oral mucosa moist   Eyes sclerae white PERRL  Respiratory: Clear to auscultation , No crackles no wheezing respirations are even and un-labored  GI: Soft, Non tender, BS active in all 4 quadrants  CVS: S1 S2 regular, no rub murmur or gallops  Skin warm dry no rashes no petechiae  Neuro grossly nonfocal cranial nerves 2-12 grossly intact sensation intact  Ext: trace LE b/l edema, Peripheral pulses intact   Heme no cervical or axillary lymphadenopathy no petechiae       Discharge Disposition to rehab      Discharge Medications   Kevin Mckeon   Home Medication Instructions NAOMY:[id number]    Printed on:02/13/18 1012   Medication Information                      acetaminophen (TYLENOL) 325 MG tablet  Take 650 mg by mouth Every 6 (Six) Hours As Needed for Mild Pain .             amLODIPine (NORVASC) 10 MG tablet  Take 1 tablet by mouth Daily.             aspirin 81 MG EC tablet  Take 81 mg by mouth Daily.             buPROPion SR (WELLBUTRIN SR) 200 MG 12 hr tablet  Take 1 tablet by mouth Daily.             Cholecalciferol 1000 units capsule  Take 1,000 Units by mouth Daily.             citalopram (CeleXA) 20 MG tablet  Take 20 mg by mouth Daily.              CloNIDine (CATAPRES) 0.1 MG tablet  Take 0.1 mg by mouth 2 (Two) Times a Day.             collagenase 250 UNIT/GM ointment  Apply  topically Daily.             epoetin chloe (EPOGEN,PROCRIT) 41081 UNIT/ML injection  Inject 1 mL under the skin 1 (One) Time Per Week. Indications: Anemia associated with Chronic Kidney Failure             gabapentin (NEURONTIN) 300 MG capsule  Take 1 capsule by mouth Every 12 (Twelve) Hours.             hydrALAZINE (APRESOLINE) 25 MG tablet  Take 3 tablets by mouth Every 8 (Eight) Hours.             HYDROcodone-acetaminophen (NORCO) 5-325 MG per tablet  Take 1 tablet by mouth Every 4 (Four) Hours As Needed for Moderate Pain .             hydrophor (AQUAPHOR) ointment ointment  Apply  topically As Needed (Dry Skin).             insulin detemir (LEVEMIR) 100 UNIT/ML injection  Inject 25 Units under the skin Every Night.             metoprolol tartrate (LOPRESSOR) 50 MG tablet  Take 1 tablet by mouth Every 12 (Twelve) Hours.             pantoprazole (PROTONIX) 40 MG EC tablet  Take 1 tablet by mouth 2 (Two) Times a Day Before Meals.             risperiDONE (risperDAL) 2 MG tablet  Take 2 mg by mouth Daily.             sodium bicarbonate 650 MG tablet  Take 1 tablet by mouth 3 (Three) Times a Day.             tamsulosin (FLOMAX) 0.4 MG capsule 24 hr capsule  Take 1 capsule by mouth Every Night.             traZODone (DESYREL) 100 MG tablet  Take 1 tablet by mouth Every Night.                 Discharge Diet: diabetic renal cardiac    Activity at Discharge: per rehab facility    Follow-up Appointments  No future appointments.   Ointment with cardiology in 1-2 weeks  Appointment with GI in 2-4 weeks  To be seen by primary care in 1-2 weeks  Nephrology will see during dialysis rounds  Coumadin to be dosed by olga Fletcher MD  02/13/18  10:12 AM    Time: Discharge 45 min             Electronically signed by Best Fletcher MD at 2/13/2018 11:02 AM

## 2018-02-15 LAB
BACTERIA SPEC AEROBE CULT: ABNORMAL
BACTERIA SPEC AEROBE CULT: ABNORMAL
GRAM STN SPEC: ABNORMAL
GRAM STN SPEC: ABNORMAL

## 2018-02-15 NOTE — PROGRESS NOTES
Case Management Discharge Note    Final Note: Pt dc'd to [facility]     Discharge Placement     Facility/Agency Request Status Selected? Address Phone Number Fax Number    [facility] Accepted    [facility] [address] [facility] [address] [phone]    [facility] Pending - Request Sent     [address] [facility] [id number] [phone]        Ambulance: [facility]    Discharge Codes: 03  Discharged/transferred to skilled nursing facility (SNF) with Medicare certification in anticipation of skilled care

## 2018-04-12 ENCOUNTER — OFFICE VISIT (OUTPATIENT)
Dept: CARDIOLOGY | Facility: CLINIC | Age: 68
End: 2018-04-12

## 2018-04-12 VITALS
SYSTOLIC BLOOD PRESSURE: 124 MMHG | BODY MASS INDEX: 26.14 KG/M2 | WEIGHT: 193 LBS | HEIGHT: 72 IN | HEART RATE: 73 BPM | DIASTOLIC BLOOD PRESSURE: 70 MMHG

## 2018-04-12 DIAGNOSIS — I48.0 PAF (PAROXYSMAL ATRIAL FIBRILLATION) (HCC): Primary | Chronic | ICD-10-CM

## 2018-04-12 DIAGNOSIS — N18.4 CKD (CHRONIC KIDNEY DISEASE) STAGE 4, GFR 15-29 ML/MIN (HCC): Chronic | ICD-10-CM

## 2018-04-12 PROCEDURE — 99212 OFFICE O/P EST SF 10 MIN: CPT | Performed by: INTERNAL MEDICINE

## 2018-04-12 RX ORDER — FUROSEMIDE 40 MG/1
40 TABLET ORAL 2 TIMES DAILY
COMMUNITY

## 2018-04-12 RX ORDER — LANOLIN ALCOHOL/MO/W.PET/CERES
400 CREAM (GRAM) TOPICAL DAILY
COMMUNITY

## 2018-04-12 RX ORDER — OLANZAPINE 5 MG/1
5 TABLET ORAL NIGHTLY
COMMUNITY

## 2018-04-12 RX ORDER — BISACODYL 5 MG/1
5 TABLET, DELAYED RELEASE ORAL DAILY PRN
COMMUNITY

## 2018-04-12 RX ORDER — DOCUSATE SODIUM 100 MG/1
100 CAPSULE, LIQUID FILLED ORAL 2 TIMES DAILY
COMMUNITY

## 2018-04-12 RX ORDER — PRAVASTATIN SODIUM 20 MG
20 TABLET ORAL DAILY
COMMUNITY

## 2018-04-12 RX ORDER — OXYCODONE HYDROCHLORIDE AND ACETAMINOPHEN 5; 325 MG/1; MG/1
1 TABLET ORAL EVERY 6 HOURS PRN
COMMUNITY

## 2018-04-12 NOTE — PROGRESS NOTES
Subjective:     Encounter Date:04/12/2018      Patient ID: Kevin Mckeon V is a 67 y.o. male.    Chief Complaint: PAF  History of Present Illness    This patient comes in today for hospital follow-up.  We saw him back in February when he was admitted; during that hospitalization he had a brief episode of paroxysmal atrial fibrillation.  He was started on metoprolol and then when he was discharged to rehabilitation he been started on warfarin.    Patient states that while in rehabilitation he was taken off both of these medicines.  He is not really sure why.  He's not had any recurrent episodes of atrial fibrillation that he is felt.    Since he was hospitalized at [facility] she's been rehospitalized at [facility].  No cardiac issues were seen there.  He was not on or put on metoprolol nor was he on put on anticoagulation.    Patient's been weak but otherwise had no complaints.  No complaints of heart racing or palpitations no presyncope or syncope.    He's been seen regularly by nephrology and dialysis.    Additionally, patient states that at times he just hasn't been taking his medicine.  He also has missed some days when he was supposed to get dialysis.  He says he is doing better with that now.    The following portions of the patient's history were reviewed and updated as appropriate: allergies, current medications, past family history, past medical history, past social history, past surgical history and problem list.    Past Medical History:   Diagnosis Date   • COPD (chronic obstructive pulmonary disease)    • Diabetes mellitus    • Hypertension        Past Surgical History:   Procedure Laterality Date   • AMPUTATION FOOT / TOE  two years ago    5th toe on right foot   • ENDOSCOPY N/A 1/19/2018    Procedure: ESOPHAGOGASTRODUODENOSCOPY;  Surgeon: Danilo Hensley MD;  Location: [facility];  Service:    • HERNIA REPAIR     • INSERTION HEMODIALYSIS CATHETER N/A 2/8/2018    Procedure: PALINDROME  INSERTION;  Surgeon: Gage Saavedra Jr., MD;  Location: [facility] OR;  Service:    • LEG DEBRIDEMENT Bilateral 1/21/2018    Procedure: LOWER EXTREMITY DEBRIDEMENT 8:00;  Surgeon: Kaylyn Tillman DO;  Location: [facility] OR;  Service:        Social History     Social History   • Marital status: Single     Spouse name: N/A   • Number of children: N/A   • Years of education: N/A     Occupational History   • Not on file.     Social History Main Topics   • Smoking status: Former Smoker     Packs/day: 3.00     Years: 37.00     Types: Cigarettes     Quit date: 2005   • Smokeless tobacco: Current User     Types: Chew   • Alcohol use 1.2 oz/week     2 Glasses of wine per week      Comment: rarely   • Drug use: No   • Sexual activity: Defer     Other Topics Concern   • Not on file     Social History Narrative   • No narrative on file       Review of Systems   Constitution: Negative for chills, decreased appetite, fever and night sweats.   HENT: Negative for ear discharge, ear pain, hearing loss, nosebleeds and sore throat.    Eyes: Negative for blurred vision, double vision and pain.   Cardiovascular: Negative for cyanosis.   Respiratory: Negative for hemoptysis and sputum production.    Endocrine: Negative for cold intolerance and heat intolerance.   Hematologic/Lymphatic: Negative for adenopathy.   Skin: Negative for dry skin, itching, nail changes, rash and suspicious lesions.   Musculoskeletal: Negative for arthritis, gout, muscle cramps, muscle weakness, myalgias and neck pain.   Gastrointestinal: Negative for anorexia, bowel incontinence, constipation, diarrhea, dysphagia, hematemesis and jaundice.   Genitourinary: Negative for bladder incontinence, dysuria, flank pain, frequency, hematuria and nocturia.   Neurological: Negative for focal weakness, numbness, paresthesias and seizures.   Psychiatric/Behavioral: Negative for altered mental status, hallucinations, hypervigilance, suicidal ideas and thoughts of  "violence.   Allergic/Immunologic: Negative for persistent infections.       Procedures       Objective:     Vitals:    04/12/18 1442   BP: 124/70   Pulse: 73   Weight: 87.5 kg (193 lb)   Height: 182.9 cm (72\")         Physical Exam   Constitutional: He is oriented to person, place, and time. He appears well-developed and well-nourished. No distress.   HENT:   Head: Normocephalic and atraumatic.   Nose: Nose normal.   Mouth/Throat: Oropharynx is clear and moist.   Eyes: Conjunctivae and EOM are normal. Pupils are equal, round, and reactive to light. Right eye exhibits no discharge. Left eye exhibits no discharge.   Neck: Normal range of motion. Neck supple. No tracheal deviation present. No thyromegaly present.   Cardiovascular: Normal rate, regular rhythm, S1 normal, S2 normal, normal heart sounds and normal pulses.  Exam reveals no S3.    Pulmonary/Chest: Effort normal and breath sounds normal. No stridor. No respiratory distress. He exhibits no tenderness.   Abdominal: Soft. Bowel sounds are normal. He exhibits no distension and no mass. There is no tenderness. There is no rebound and no guarding.   Musculoskeletal: Normal range of motion. He exhibits no tenderness or deformity.   Lymphadenopathy:     He has no cervical adenopathy.   Neurological: He is alert and oriented to person, place, and time. He has normal reflexes.   Skin: Skin is warm and dry. No rash noted. He is not diaphoretic. No erythema.   Psychiatric: He has a normal mood and affect. Thought content normal.       Lab Review:             Performed        Assessment:          Diagnosis Plan   1. PAF (paroxysmal atrial fibrillation)     2. CKD (chronic kidney disease) stage 4, GFR 15-29 ml/min            Plan:       This patient had episode of paroxysmal atrial fibrillation.  He was originally started on beta-blockade, but he states that the nephrologist stopped that.  He thinks he was having too low a heart rate during dialysis.  He was also " initially started on warfarin, but that's been discontinued.  We'll call and get records from nephrology, but he is very open to the fact that he is at times not been taking the medicines as prescribed and that he is missed dialysis, so certainly he would not be a good or safe warfarin candidate.  We will continue to follow, but I'm not can initiate any therapy until we get those records.  Largely, we will defer to nephrology at this time.    Thank you very much for allowing us to participate in the care of this pleasant patient.  Please don't hesitate to call if I can be of assistance in any way.      Current Outpatient Prescriptions:   •  acetaminophen (TYLENOL) 325 MG tablet, Take 650 mg by mouth Every 6 (Six) Hours As Needed for Mild Pain ., Disp: , Rfl:   •  amLODIPine (NORVASC) 10 MG tablet, Take 1 tablet by mouth Daily., Disp: , Rfl:   •  aspirin 81 MG EC tablet, Take 81 mg by mouth Daily., Disp: , Rfl:   •  bisacodyl (DULCOLAX) 5 MG EC tablet, Take 5 mg by mouth Daily As Needed for Constipation., Disp: , Rfl:   •  buPROPion SR (WELLBUTRIN SR) 200 MG 12 hr tablet, Take 1 tablet by mouth Daily., Disp: , Rfl:   •  Cholecalciferol 1000 units capsule, Take 1,000 Units by mouth Daily., Disp: , Rfl:   •  citalopram (CeleXA) 20 MG tablet, Take 20 mg by mouth Daily., Disp: , Rfl:   •  CloNIDine (CATAPRES) 0.1 MG tablet, Take 0.1 mg by mouth 3 (Three) Times a Day., Disp: , Rfl:   •  docusate sodium (COLACE) 100 MG capsule, Take 100 mg by mouth 2 (Two) Times a Day., Disp: , Rfl:   •  folic acid (FOLVITE) 400 MCG tablet, Take 400 mcg by mouth Daily., Disp: , Rfl:   •  furosemide (LASIX) 40 MG tablet, Take 40 mg by mouth 2 (Two) Times a Day., Disp: , Rfl:   •  gabapentin (NEURONTIN) 300 MG capsule, Take 1 capsule by mouth Every 12 (Twelve) Hours., Disp: , Rfl:   •  insulin detemir (LEVEMIR) 100 UNIT/ML injection, Inject 10 Units under the skin Every Morning., Disp: 100 mL, Rfl: 0  •  insulin detemir (LEVEMIR) 100 UNIT/ML  injection, Inject 22 Units under the skin Every Night., Disp: 60 mL, Rfl: 0  •  Multiple Vitamin (MULTI-VITAMIN PO), Take 1 tablet by mouth Daily., Disp: , Rfl:   •  OLANZapine (zyPREXA) 5 MG tablet, Take 5 mg by mouth Every Night., Disp: , Rfl:   •  oxyCODONE-acetaminophen (PERCOCET) 5-325 MG per tablet, Take 1 tablet by mouth Every 6 (Six) Hours As Needed., Disp: , Rfl:   •  pravastatin (PRAVACHOL) 20 MG tablet, Take 20 mg by mouth Daily., Disp: , Rfl:   •  tamsulosin (FLOMAX) 0.4 MG capsule 24 hr capsule, Take 1 capsule by mouth Every Night., Disp: 30 capsule, Rfl:   •  traZODone (DESYREL) 100 MG tablet, Take 1 tablet by mouth Every Night., Disp: , Rfl:

## 2018-04-13 ENCOUNTER — TELEPHONE (OUTPATIENT)
Dept: CARDIOLOGY | Facility: CLINIC | Age: 68
End: 2018-04-13

## 2018-04-13 NOTE — TELEPHONE ENCOUNTER
Return [place] phone call. JULIA with rodrick at  office to have her give me a call back to discuss.

## 2018-04-13 NOTE — TELEPHONE ENCOUNTER
Maggy from [facility] is calling for an update on Pt after his appointment with Dr. Fung yesterday    [phone]

## 2018-07-20 ENCOUNTER — LAB REQUISITION (OUTPATIENT)
Dept: LAB | Facility: HOSPITAL | Age: 68
End: 2018-07-20

## 2018-07-20 DIAGNOSIS — Z00.00 ROUTINE GENERAL MEDICAL EXAMINATION AT A HEALTH CARE FACILITY: ICD-10-CM

## 2018-07-20 LAB
ANION GAP SERPL CALCULATED.3IONS-SCNC: 15.1 MMOL/L
BUN BLD-MCNC: 26 MG/DL (ref 8–23)
BUN/CREAT SERPL: 12.5 (ref 7–25)
CALCIUM SPEC-SCNC: 8.1 MG/DL (ref 8.6–10.5)
CHLORIDE SERPL-SCNC: 89 MMOL/L (ref 98–107)
CO2 SERPL-SCNC: 27.9 MMOL/L (ref 22–29)
CREAT BLD-MCNC: 2.08 MG/DL (ref 0.76–1.27)
GFR SERPL CREATININE-BSD FRML MDRD: 32 ML/MIN/1.73
GLUCOSE BLD-MCNC: 181 MG/DL (ref 65–99)
POTASSIUM BLD-SCNC: 4 MMOL/L (ref 3.5–5.2)
SODIUM BLD-SCNC: 132 MMOL/L (ref 136–145)

## 2018-07-20 PROCEDURE — 80048 BASIC METABOLIC PNL TOTAL CA: CPT

## 2018-11-23 ENCOUNTER — LAB REQUISITION (OUTPATIENT)
Dept: LAB | Facility: HOSPITAL | Age: 68
End: 2018-11-23

## 2018-11-23 DIAGNOSIS — Z00.00 ROUTINE GENERAL MEDICAL EXAMINATION AT A HEALTH CARE FACILITY: ICD-10-CM

## 2018-11-23 PROCEDURE — 80048 BASIC METABOLIC PNL TOTAL CA: CPT

## 2018-11-24 LAB
ANION GAP SERPL CALCULATED.3IONS-SCNC: 15.8 MMOL/L
BUN BLD-MCNC: 33 MG/DL (ref 8–23)
BUN/CREAT SERPL: 12.5 (ref 7–25)
CALCIUM SPEC-SCNC: 8.3 MG/DL (ref 8.6–10.5)
CHLORIDE SERPL-SCNC: 90 MMOL/L (ref 98–107)
CO2 SERPL-SCNC: 26.2 MMOL/L (ref 22–29)
CREAT BLD-MCNC: 2.64 MG/DL (ref 0.76–1.27)
GFR SERPL CREATININE-BSD FRML MDRD: 24 ML/MIN/1.73
GLUCOSE BLD-MCNC: 249 MG/DL (ref 65–99)
POTASSIUM BLD-SCNC: 3.5 MMOL/L (ref 3.5–5.2)
SODIUM BLD-SCNC: 132 MMOL/L (ref 136–145)

## 2018-12-29 ENCOUNTER — LAB REQUISITION (OUTPATIENT)
Dept: LAB | Facility: HOSPITAL | Age: 68
End: 2018-12-29

## 2018-12-29 DIAGNOSIS — Z00.00 ROUTINE GENERAL MEDICAL EXAMINATION AT A HEALTH CARE FACILITY: ICD-10-CM

## 2018-12-29 LAB
ANION GAP SERPL CALCULATED.3IONS-SCNC: 19.1 MMOL/L
BASOPHILS # BLD AUTO: 0.03 10*3/MM3 (ref 0–0.2)
BASOPHILS NFR BLD AUTO: 0.2 % (ref 0–1.5)
BUN BLD-MCNC: 39 MG/DL (ref 8–23)
BUN/CREAT SERPL: 11.7 (ref 7–25)
CALCIUM SPEC-SCNC: 8.7 MG/DL (ref 8.6–10.5)
CHLORIDE SERPL-SCNC: 79 MMOL/L (ref 98–107)
CO2 SERPL-SCNC: 25.9 MMOL/L (ref 22–29)
CREAT BLD-MCNC: 3.32 MG/DL (ref 0.76–1.27)
DEPRECATED RDW RBC AUTO: 43 FL (ref 37–54)
EOSINOPHIL # BLD AUTO: 0.12 10*3/MM3 (ref 0–0.7)
EOSINOPHIL NFR BLD AUTO: 0.9 % (ref 0.3–6.2)
ERYTHROCYTE [DISTWIDTH] IN BLOOD BY AUTOMATED COUNT: 14.3 % (ref 11.5–14.5)
GFR SERPL CREATININE-BSD FRML MDRD: 19 ML/MIN/1.73
GLUCOSE BLD-MCNC: 184 MG/DL (ref 65–99)
HCT VFR BLD AUTO: 36.6 % (ref 40.4–52.2)
HGB BLD-MCNC: 13 G/DL (ref 13.7–17.6)
IMM GRANULOCYTES # BLD AUTO: 0.03 10*3/MM3 (ref 0–0.03)
IMM GRANULOCYTES NFR BLD AUTO: 0.2 % (ref 0–0.5)
LYMPHOCYTES # BLD AUTO: 0.79 10*3/MM3 (ref 0.9–4.8)
LYMPHOCYTES NFR BLD AUTO: 5.7 % (ref 19.6–45.3)
MCH RBC QN AUTO: 29 PG (ref 27–32.7)
MCHC RBC AUTO-ENTMCNC: 35.5 G/DL (ref 32.6–36.4)
MCV RBC AUTO: 81.5 FL (ref 79.8–96.2)
MONOCYTES # BLD AUTO: 0.57 10*3/MM3 (ref 0.2–1.2)
MONOCYTES NFR BLD AUTO: 4.1 % (ref 5–12)
NEUTROPHILS # BLD AUTO: 12.27 10*3/MM3 (ref 1.9–8.1)
NEUTROPHILS NFR BLD AUTO: 89.1 % (ref 42.7–76)
PLATELET # BLD AUTO: 227 10*3/MM3 (ref 140–500)
PMV BLD AUTO: 9.8 FL (ref 6–12)
POTASSIUM BLD-SCNC: 2.9 MMOL/L (ref 3.5–5.2)
RBC # BLD AUTO: 4.49 10*6/MM3 (ref 4.6–6)
SODIUM BLD-SCNC: 124 MMOL/L (ref 136–145)
WBC NRBC COR # BLD: 13.78 10*3/MM3 (ref 4.5–10.7)

## 2018-12-29 PROCEDURE — 80048 BASIC METABOLIC PNL TOTAL CA: CPT

## 2018-12-29 PROCEDURE — 85025 COMPLETE CBC W/AUTO DIFF WBC: CPT

## 2019-10-24 NOTE — PROGRESS NOTES
"     LOS: 6 days   Primary Care Physician: Alicia Harley MD     Subjective   Sleeping but awakens easily.  Didn't sleep much last night    Vital Signs  Body mass index is 25.02 kg/(m^2).  Temp:  [97.6 °F (36.4 °C)-99 °F (37.2 °C)] 98.5 °F (36.9 °C)  Heart Rate:  [64-67] 64  Resp:  [18] 18  BP: (141-154)/(80-85) 141/80      Objective:  General Appearance:  In no acute distress.    Vital signs: (most recent): Blood pressure 141/80, pulse 64, temperature 98.5 °F (36.9 °C), temperature source Oral, resp. rate 18, height 182.9 cm (72.01\"), weight 83.7 kg (184 lb 8.4 oz), SpO2 96 %.    Lungs:  There are rales and decreased breath sounds.  No wheezes or rhonchi.  (Occasional scattered crackle)  Heart: Normal rate.  Regular rhythm.  No murmur.   Abdomen: Abdomen is soft.  (Obese)Bowel sounds are normal.   There is no abdominal tenderness.   There is no splenomegaly. There is no hepatomegaly.   Extremities: There is dependent edema.  (Trace at ankles)  Neurological: Patient is alert.          Results Review:    I reviewed the patient's new clinical results.      Results from last 7 days  Lab Units 02/11/18  0634 02/10/18  0622   WBC 10*3/mm3 8.37 4.97   HEMOGLOBIN g/dL 9.3* 7.8*   PLATELETS 10*3/mm3 257 242       Results from last 7 days  Lab Units 02/11/18  0634 02/10/18  0622   SODIUM mmol/L 139 138   POTASSIUM mmol/L 4.4 4.1   CHLORIDE mmol/L 103 101   CO2 mmol/L 26.2 29.2*   BUN mg/dL 34* 24*   CREATININE mg/dL 3.11* 2.80*   CALCIUM mg/dL 8.1* 8.1*   GLUCOSE mg/dL 65 51*         Hemoglobin A1C:  Lab Results   Component Value Date    HGBA1C 10.60 (H) 02/06/2018       Glucose Range:  Glucose   Date/Time Value Ref Range Status   02/11/2018 1536 179 (H) 70 - 130 mg/dL Final   02/11/2018 1146 107 70 - 130 mg/dL Final   02/11/2018 0746 72 70 - 130 mg/dL Final   02/10/2018 2030 133 (H) 70 - 130 mg/dL Final   02/10/2018 1554 201 (H) 70 - 130 mg/dL Final   02/10/2018 1146 262 (H) 70 - 130 mg/dL Final       Lab Results "   Component Value Date    CODUDWST46 587 01/23/2018       No results found for: TSH    Assessment & Plan      Medication Review: Yes    Active Hospital Problems (** Indicates Principal Problem)    Diagnosis Date Noted   • Acute renal failure (ARF) [N17.9] 02/06/2018      Resolved Hospital Problems    Diagnosis Date Noted Date Resolved   No resolved problems to display.       Assessment/Plan  1.  Diabetes mellitus2.  He had hypoglycemia again his morning early with glucose of 65 around 6:30 AM.  Will further decrease evening dose of Levemir and start a low dose of Levemir with breakfast.  Continue sliding scale as needed.  2.  Iron deficiency anemia and anemia of chronic disease.  Hemoglobin improved.  3.  End-stage renal disease, now on dialysis.  4.  Acute hypoxic respiratory failure.  Oximetry tomorrow after dialysis    Shruti Porras MD  02/11/18  6:52 PM         Yes

## 2020-05-25 ENCOUNTER — LAB REQUISITION (OUTPATIENT)
Dept: LAB | Facility: HOSPITAL | Age: 70
End: 2020-05-25

## 2020-05-25 DIAGNOSIS — Z00.00 ROUTINE GENERAL MEDICAL EXAMINATION AT A HEALTH CARE FACILITY: ICD-10-CM

## 2020-05-25 LAB
ANION GAP SERPL CALCULATED.3IONS-SCNC: 18 MMOL/L (ref 5–15)
BASOPHILS # BLD AUTO: 0.01 10*3/MM3 (ref 0–0.2)
BASOPHILS NFR BLD AUTO: 0.1 % (ref 0–1.5)
BUN BLD-MCNC: 44 MG/DL (ref 8–23)
BUN/CREAT SERPL: 10.4 (ref 7–25)
CALCIUM SPEC-SCNC: 8.6 MG/DL (ref 8.6–10.5)
CHLORIDE SERPL-SCNC: 88 MMOL/L (ref 98–107)
CO2 SERPL-SCNC: 21 MMOL/L (ref 22–29)
CREAT BLD-MCNC: 4.24 MG/DL (ref 0.76–1.27)
DEPRECATED RDW RBC AUTO: 42.8 FL (ref 37–54)
EOSINOPHIL # BLD AUTO: 0 10*3/MM3 (ref 0–0.4)
EOSINOPHIL NFR BLD AUTO: 0 % (ref 0.3–6.2)
ERYTHROCYTE [DISTWIDTH] IN BLOOD BY AUTOMATED COUNT: 12.8 % (ref 12.3–15.4)
GFR SERPL CREATININE-BSD FRML MDRD: 14 ML/MIN/1.73
GFR SERPL CREATININE-BSD FRML MDRD: ABNORMAL ML/MIN/{1.73_M2}
GLUCOSE BLD-MCNC: 114 MG/DL (ref 65–99)
HCT VFR BLD AUTO: 31.7 % (ref 37.5–51)
HGB BLD-MCNC: 10.7 G/DL (ref 13–17.7)
IMM GRANULOCYTES # BLD AUTO: 0.04 10*3/MM3 (ref 0–0.05)
IMM GRANULOCYTES NFR BLD AUTO: 0.4 % (ref 0–0.5)
LYMPHOCYTES # BLD AUTO: 0.6 10*3/MM3 (ref 0.7–3.1)
LYMPHOCYTES NFR BLD AUTO: 5.7 % (ref 19.6–45.3)
MCH RBC QN AUTO: 31 PG (ref 26.6–33)
MCHC RBC AUTO-ENTMCNC: 33.8 G/DL (ref 31.5–35.7)
MCV RBC AUTO: 91.9 FL (ref 79–97)
MONOCYTES # BLD AUTO: 0.58 10*3/MM3 (ref 0.1–0.9)
MONOCYTES NFR BLD AUTO: 5.5 % (ref 5–12)
NEUTROPHILS # BLD AUTO: 9.36 10*3/MM3 (ref 1.7–7)
NEUTROPHILS NFR BLD AUTO: 88.3 % (ref 42.7–76)
NRBC BLD AUTO-RTO: 0 /100 WBC (ref 0–0.2)
PLATELET # BLD AUTO: 178 10*3/MM3 (ref 140–450)
PMV BLD AUTO: 10.2 FL (ref 6–12)
POTASSIUM BLD-SCNC: 4.7 MMOL/L (ref 3.5–5.2)
RBC # BLD AUTO: 3.45 10*6/MM3 (ref 4.14–5.8)
SODIUM BLD-SCNC: 127 MMOL/L (ref 136–145)
WBC NRBC COR # BLD: 10.59 10*3/MM3 (ref 3.4–10.8)

## 2020-05-25 PROCEDURE — 85025 COMPLETE CBC W/AUTO DIFF WBC: CPT

## 2020-05-25 PROCEDURE — 80048 BASIC METABOLIC PNL TOTAL CA: CPT

## 2024-05-09 NOTE — PROGRESS NOTES
"  Name: Kevin Mckeon V  ADMIT: 2018   Age/Sex: 67 y.o.male LOS:  LOS: 3 days    :    1950     ROOM: [facility]   MRN:    [id number]    PCP:    Alicia Harley MD     Subjective   States he wore BiPAP overnight. No issues with breathing. To go for HD cathter placement today    Objective   Vital Signs  Temp:  [98 °F (36.7 °C)-98.4 °F (36.9 °C)] 98 °F (36.7 °C)  Heart Rate:  [63-73] 63  Resp:  [16-18] 16  BP: (144-173)/(77-91) 144/85  Body mass index is 25.02 kg/(m^2).    Objective:  General Appearance:  Comfortable and well-appearing.    Vital signs: (most recent): Blood pressure 144/85, pulse 63, temperature 98 °F (36.7 °C), temperature source Oral, resp. rate 16, height 182.9 cm (72.01\"), weight 83.7 kg (184 lb 8.4 oz), SpO2 93 %.  Vital signs are normal.    HEENT: Normal HEENT exam.    Lungs:  Normal effort.  There are decreased breath sounds.    Heart: Normal rate.  Regular rhythm.    Abdomen: Abdomen is soft and non-distended.  Bowel sounds are normal.   There is no abdominal tenderness.     Extremities: There is dependent edema.  There is no deformity.    Neurological: Patient is alert and oriented to person, place and time.    Skin:  Warm and dry.  No rash.             Results Review:       I reviewed the patient's new clinical results.    Results from last 7 days  Lab Units 18  0514 18  0558 18  2212 18  1402 18  0440   WBC 10*3/mm3 5.73  --   --   --  7.48   HEMOGLOBIN g/dL 8.0* 8.2* 8.2* 6.8* 6.9*   PLATELETS 10*3/mm3 239  --   --   --  266     Results from last 7 days  Lab Units 18  0514 18  0558 18  0440   SODIUM mmol/L 138 139 135*   POTASSIUM mmol/L 3.9 3.9 4.9   CHLORIDE mmol/L 102 102 100   CO2 mmol/L 27.5 24.3 22.6   BUN mg/dL 36* 29* 63*   CREATININE mg/dL 2.78* 2.40* 3.85*   GLUCOSE mg/dL 111* 98 258*   Estimated Creatinine Clearance: 31.4 mL/min (by C-G formula based on Cr of 2.78).  Results from last 7 days  Lab Units 18  0514 " TSH with reflex   02/07/18  0558 02/06/18  0440   CALCIUM mg/dL 8.0* 8.0* 8.3*   ALBUMIN g/dL 2.40* 2.20*  --    MAGNESIUM mg/dL  --   --  2.1   PHOSPHORUS mg/dL 4.8* 3.8 6.7*       RADIOLOGY  2/8/2018  Pending      amLODIPine 10 mg Oral Q24H   arformoterol 15 mcg Nebulization BID - RT   aspirin 81 mg Oral Daily   budesonide 0.25 mg Nebulization BID - RT   buPROPion  mg Oral Q12H   cholecalciferol 1,000 Units Oral Daily   citalopram 20 mg Oral Daily   CloNIDine 0.2 mg Oral Q12H   epoetin chloe 20,000 Units Subcutaneous Weekly   gabapentin 300 mg Oral Q12H   heparin (porcine) 3,000 Units Intracatheter Once   hydrALAZINE 50 mg Oral Q8H   hydrocerin  Topical Daily   insulin aspart 0-9 Units Subcutaneous 4x Daily With Meals & Nightly   insulin detemir 30 Units Subcutaneous Nightly   ipratropium-albuterol 3 mL Nebulization Q6H While Awake - RT   iron sucrose 200 mg Intravenous Q24H   metoprolol tartrate 50 mg Oral Q12H   pantoprazole 40 mg Oral BID AC   risperiDONE 2 mg Oral Nightly   sodium bicarbonate 650 mg Oral TID   tamsulosin 0.4 mg Oral Nightly   traZODone 100 mg Oral Nightly      NPO Diet NPO Except: Sips With Meds      Assessment/Plan   Assessment:   Active Problems:    Acute renal failure (ARF)        Plan:   1. Iron deficiency anemia  - appears he had Upper endoscopy during admission to [place] in 01/2018   - Hb better after 1u pRBC on 2/6  - I think this was more related to his CKD then GI bleed.  - IV iron per renal      2. CKD4 with progression to ESRD  - on HD now and much improved  - to go for tunneled HD cathter today  - renal managing      3. DM2  - poorly controlled yesterday afternoon/evening. He states he is eating double meals. Will place on 2000 calorie restriction when able to eat again  - levemir increased to 30u. He is NPO but will add meal time insulin with hold parameters  - CC diet      4. Acute hypercapneic and hypoxic resp failure  - due to underlying MING/OHV and fluid overload  - better after BiPAP  and HD  - Pulm following      5. HTN  - home meds re-ordered, defer to renal for any changes     6. LE wounds  - ABIs normal  - ID has seen and no signs of active infection currently. Cont local wound care       Disposition  TBD.      He Strange MD  [facility]  02/08/18  7:20 AM

## (undated) DEVICE — GAUZE,SPONGE,FLUFF,6"X6.75",STRL,10/TRAY: Brand: MEDLINE

## (undated) DEVICE — GLV SURG SENSICARE LT W/ALOE PF LF 7 STRL

## (undated) DEVICE — Device

## (undated) DEVICE — INTENDED FOR TISSUE SEPARATION, AND OTHER PROCEDURES THAT REQUIRE A SHARP SURGICAL BLADE TO PUNCTURE OR CUT.: Brand: BARD-PARKER ® CARBON RIB-BACK BLADES

## (undated) DEVICE — ANTIBACTERIAL UNDYED BRAIDED (POLYGLACTIN 910), SYNTHETIC ABSORBABLE SUTURE: Brand: COATED VICRYL

## (undated) DEVICE — SUCTION CANISTER, 3000CC,SAFELINER: Brand: DEROYAL

## (undated) DEVICE — ADHS SKIN DERMABOND TOP ADVANCED

## (undated) DEVICE — ADHS SKIN DERMABOND

## (undated) DEVICE — MASK,FACE,SHIELD,BLUE,ANTI FOG,TIES: Brand: MEDLINE

## (undated) DEVICE — SUT ETHLN 2/0 PS 18IN 585H

## (undated) DEVICE — THE BITE BLOCK MAXI, LATEX FREE STRAP IS USED TO PROTECT THE ENDOSCOPE INSERTION TUBE FROM BEING BITTEN BY THE PATIENT.

## (undated) DEVICE — GOWN ISOL W/THUMB UNIV BLU BX/15

## (undated) DEVICE — DECANT BG O JET

## (undated) DEVICE — PAD,ABDOMINAL,8"X10",ST,LF: Brand: MEDLINE

## (undated) DEVICE — CVR PROB 96IN LF STRL

## (undated) DEVICE — Device: Brand: DEFENDO AIR/WATER/SUCTION AND BIOPSY VALVE

## (undated) DEVICE — SUT SILK 4/0 TIES 18IN A183H

## (undated) DEVICE — GOWN,NON-REINFORCED,SIRUS,SET IN SLV,XXL: Brand: MEDLINE

## (undated) DEVICE — SYR LUERLOK 20CC

## (undated) DEVICE — LAG MINOR PROCEDURE: Brand: MEDLINE INDUSTRIES, INC.

## (undated) DEVICE — NDL HYPO PRECISIONGLIDE REG 25G 1 1/2

## (undated) DEVICE — BW-412T DISP COMBO CLEANING BRUSH: Brand: SINGLE USE COMBINATION CLEANING BRUSH

## (undated) DEVICE — SYR LL 3CC

## (undated) DEVICE — LOU MINOR PROCEDURE: Brand: MEDLINE INDUSTRIES, INC.

## (undated) DEVICE — GOWN,PREVENTION PLUS,XLONG/XLARGE,STRL: Brand: MEDLINE

## (undated) DEVICE — VIAL FORMALIN CAP 10P 40ML

## (undated) DEVICE — FRCP BX RADJAW4 NDL 2.8 240CM LG OG BX40

## (undated) DEVICE — SUT SILK 2/0 TIES 18IN A185H

## (undated) DEVICE — JACKT LAB F/R KNIT CUFF/COLR XLG BLU

## (undated) DEVICE — BIOPATCH™ ANTIMICROBIAL DRESSING WITH CHLORHEXIDINE GLUCONATE IS A HYDROPHILLIC POLYURETHANE ABSORPTIVE FOAM WITH CHLORHEXIDINE GLUCONATE (CHG) WHICH INHIBITS BACTERIAL GROWTH UNDER THE DRESSING. THE DRESSING IS INTENDED TO BE USED TO ABSORB EXUDATE, COVER A WOUND CAUSED BY VASCULAR AND NONVASCULAR PERCUTANEOUS MEDICAL DEVICES DURING SURGERY, AS WELL AS REDUCE LOCAL INFECTION AND COLONIZATION OF MICROORGANISMS.: Brand: BIOPATCH

## (undated) DEVICE — SYR LUERLOK 5CC

## (undated) DEVICE — APPL CHLORAPREP W/TINT 10.5ML PERC STRL

## (undated) DEVICE — GLV SURG NEOLON 2G PF LF 7.5 STRL

## (undated) DEVICE — GLV SURG SENSICARE W/ALOE PF LF 6.5 STRL

## (undated) DEVICE — SPNG GZ WOVN 4X4IN 12PLY 10/BX STRL

## (undated) DEVICE — GLV SURG TRIUMPH CLASSIC PF LTX 8 STRL